# Patient Record
Sex: FEMALE | Race: BLACK OR AFRICAN AMERICAN | NOT HISPANIC OR LATINO | URBAN - METROPOLITAN AREA
[De-identification: names, ages, dates, MRNs, and addresses within clinical notes are randomized per-mention and may not be internally consistent; named-entity substitution may affect disease eponyms.]

---

## 2021-10-13 ENCOUNTER — INPATIENT (INPATIENT)
Facility: HOSPITAL | Age: 56
LOS: 0 days | Discharge: ANOTHER HOSPITAL | End: 2021-10-14
Attending: INTERNAL MEDICINE | Admitting: INTERNAL MEDICINE
Payer: COMMERCIAL

## 2021-10-13 VITALS
RESPIRATION RATE: 16 BRPM | HEART RATE: 74 BPM | OXYGEN SATURATION: 99 % | TEMPERATURE: 98 F | DIASTOLIC BLOOD PRESSURE: 82 MMHG | WEIGHT: 259.26 LBS | SYSTOLIC BLOOD PRESSURE: 136 MMHG

## 2021-10-13 DIAGNOSIS — Z29.9 ENCOUNTER FOR PROPHYLACTIC MEASURES, UNSPECIFIED: ICD-10-CM

## 2021-10-13 DIAGNOSIS — I61.9 NONTRAUMATIC INTRACEREBRAL HEMORRHAGE, UNSPECIFIED: ICD-10-CM

## 2021-10-13 DIAGNOSIS — E16.2 HYPOGLYCEMIA, UNSPECIFIED: ICD-10-CM

## 2021-10-13 LAB
ALBUMIN SERPL ELPH-MCNC: 3.8 G/DL — SIGNIFICANT CHANGE UP (ref 3.3–5)
ALP SERPL-CCNC: 74 U/L — SIGNIFICANT CHANGE UP (ref 40–120)
ALT FLD-CCNC: 13 U/L — SIGNIFICANT CHANGE UP (ref 4–33)
ANION GAP SERPL CALC-SCNC: 10 MMOL/L — SIGNIFICANT CHANGE UP (ref 7–14)
APPEARANCE UR: CLEAR — SIGNIFICANT CHANGE UP
APTT BLD: 36.7 SEC — HIGH (ref 27–36.3)
AST SERPL-CCNC: 22 U/L — SIGNIFICANT CHANGE UP (ref 4–32)
B PERT DNA SPEC QL NAA+PROBE: SIGNIFICANT CHANGE UP
B PERT+PARAPERT DNA PNL SPEC NAA+PROBE: SIGNIFICANT CHANGE UP
BASOPHILS # BLD AUTO: 0.01 K/UL — SIGNIFICANT CHANGE UP (ref 0–0.2)
BASOPHILS NFR BLD AUTO: 0.2 % — SIGNIFICANT CHANGE UP (ref 0–2)
BILIRUB SERPL-MCNC: 0.2 MG/DL — SIGNIFICANT CHANGE UP (ref 0.2–1.2)
BILIRUB UR-MCNC: NEGATIVE — SIGNIFICANT CHANGE UP
BLOOD GAS VENOUS COMPREHENSIVE RESULT: SIGNIFICANT CHANGE UP
BORDETELLA PARAPERTUSSIS (RAPRVP): SIGNIFICANT CHANGE UP
BUN SERPL-MCNC: 8 MG/DL — SIGNIFICANT CHANGE UP (ref 7–23)
C PNEUM DNA SPEC QL NAA+PROBE: SIGNIFICANT CHANGE UP
CALCIUM SERPL-MCNC: 9 MG/DL — SIGNIFICANT CHANGE UP (ref 8.4–10.5)
CHLORIDE SERPL-SCNC: 107 MMOL/L — SIGNIFICANT CHANGE UP (ref 98–107)
CO2 SERPL-SCNC: 24 MMOL/L — SIGNIFICANT CHANGE UP (ref 22–31)
COLOR SPEC: SIGNIFICANT CHANGE UP
CREAT SERPL-MCNC: 0.91 MG/DL — SIGNIFICANT CHANGE UP (ref 0.5–1.3)
DIFF PNL FLD: NEGATIVE — SIGNIFICANT CHANGE UP
EOSINOPHIL # BLD AUTO: 0.05 K/UL — SIGNIFICANT CHANGE UP (ref 0–0.5)
EOSINOPHIL NFR BLD AUTO: 0.9 % — SIGNIFICANT CHANGE UP (ref 0–6)
FLUAV SUBTYP SPEC NAA+PROBE: SIGNIFICANT CHANGE UP
FLUBV RNA SPEC QL NAA+PROBE: SIGNIFICANT CHANGE UP
GLUCOSE BLDC GLUCOMTR-MCNC: 78 MG/DL — SIGNIFICANT CHANGE UP (ref 70–99)
GLUCOSE SERPL-MCNC: 84 MG/DL — SIGNIFICANT CHANGE UP (ref 70–99)
GLUCOSE UR QL: NEGATIVE — SIGNIFICANT CHANGE UP
HADV DNA SPEC QL NAA+PROBE: SIGNIFICANT CHANGE UP
HCOV 229E RNA SPEC QL NAA+PROBE: SIGNIFICANT CHANGE UP
HCOV HKU1 RNA SPEC QL NAA+PROBE: SIGNIFICANT CHANGE UP
HCOV NL63 RNA SPEC QL NAA+PROBE: SIGNIFICANT CHANGE UP
HCOV OC43 RNA SPEC QL NAA+PROBE: SIGNIFICANT CHANGE UP
HCT VFR BLD CALC: 40.2 % — SIGNIFICANT CHANGE UP (ref 34.5–45)
HGB BLD-MCNC: 12.7 G/DL — SIGNIFICANT CHANGE UP (ref 11.5–15.5)
HMPV RNA SPEC QL NAA+PROBE: SIGNIFICANT CHANGE UP
HPIV1 RNA SPEC QL NAA+PROBE: SIGNIFICANT CHANGE UP
HPIV2 RNA SPEC QL NAA+PROBE: SIGNIFICANT CHANGE UP
HPIV3 RNA SPEC QL NAA+PROBE: SIGNIFICANT CHANGE UP
HPIV4 RNA SPEC QL NAA+PROBE: SIGNIFICANT CHANGE UP
IANC: 3.23 K/UL — SIGNIFICANT CHANGE UP (ref 1.5–8.5)
IMM GRANULOCYTES NFR BLD AUTO: 0.4 % — SIGNIFICANT CHANGE UP (ref 0–1.5)
INR BLD: 1.04 RATIO — SIGNIFICANT CHANGE UP (ref 0.88–1.16)
KETONES UR-MCNC: NEGATIVE — SIGNIFICANT CHANGE UP
LEUKOCYTE ESTERASE UR-ACNC: NEGATIVE — SIGNIFICANT CHANGE UP
LYMPHOCYTES # BLD AUTO: 1.93 K/UL — SIGNIFICANT CHANGE UP (ref 1–3.3)
LYMPHOCYTES # BLD AUTO: 34.5 % — SIGNIFICANT CHANGE UP (ref 13–44)
M PNEUMO DNA SPEC QL NAA+PROBE: SIGNIFICANT CHANGE UP
MCHC RBC-ENTMCNC: 26.2 PG — LOW (ref 27–34)
MCHC RBC-ENTMCNC: 31.6 GM/DL — LOW (ref 32–36)
MCV RBC AUTO: 83.1 FL — SIGNIFICANT CHANGE UP (ref 80–100)
MONOCYTES # BLD AUTO: 0.36 K/UL — SIGNIFICANT CHANGE UP (ref 0–0.9)
MONOCYTES NFR BLD AUTO: 6.4 % — SIGNIFICANT CHANGE UP (ref 2–14)
NEUTROPHILS # BLD AUTO: 3.23 K/UL — SIGNIFICANT CHANGE UP (ref 1.8–7.4)
NEUTROPHILS NFR BLD AUTO: 57.6 % — SIGNIFICANT CHANGE UP (ref 43–77)
NITRITE UR-MCNC: NEGATIVE — SIGNIFICANT CHANGE UP
NRBC # BLD: 0 /100 WBCS — SIGNIFICANT CHANGE UP
NRBC # FLD: 0 K/UL — SIGNIFICANT CHANGE UP
PH UR: 7 — SIGNIFICANT CHANGE UP (ref 5–8)
PLATELET # BLD AUTO: 237 K/UL — SIGNIFICANT CHANGE UP (ref 150–400)
POTASSIUM SERPL-MCNC: 4.2 MMOL/L — SIGNIFICANT CHANGE UP (ref 3.5–5.3)
POTASSIUM SERPL-SCNC: 4.2 MMOL/L — SIGNIFICANT CHANGE UP (ref 3.5–5.3)
PROT SERPL-MCNC: 7.7 G/DL — SIGNIFICANT CHANGE UP (ref 6–8.3)
PROT UR-MCNC: ABNORMAL
PROTHROM AB SERPL-ACNC: 11.9 SEC — SIGNIFICANT CHANGE UP (ref 10.6–13.6)
RAPID RVP RESULT: SIGNIFICANT CHANGE UP
RBC # BLD: 4.84 M/UL — SIGNIFICANT CHANGE UP (ref 3.8–5.2)
RBC # FLD: 15.3 % — HIGH (ref 10.3–14.5)
RSV RNA SPEC QL NAA+PROBE: SIGNIFICANT CHANGE UP
RV+EV RNA SPEC QL NAA+PROBE: SIGNIFICANT CHANGE UP
SARS-COV-2 RNA SPEC QL NAA+PROBE: SIGNIFICANT CHANGE UP
SODIUM SERPL-SCNC: 141 MMOL/L — SIGNIFICANT CHANGE UP (ref 135–145)
SP GR SPEC: >1.05 (ref 1–1.05)
TROPONIN T, HIGH SENSITIVITY RESULT: <6 NG/L — SIGNIFICANT CHANGE UP
UROBILINOGEN FLD QL: SIGNIFICANT CHANGE UP
WBC # BLD: 5.6 K/UL — SIGNIFICANT CHANGE UP (ref 3.8–10.5)
WBC # FLD AUTO: 5.6 K/UL — SIGNIFICANT CHANGE UP (ref 3.8–10.5)

## 2021-10-13 PROCEDURE — 70450 CT HEAD/BRAIN W/O DYE: CPT | Mod: 26,59,77

## 2021-10-13 PROCEDURE — 71045 X-RAY EXAM CHEST 1 VIEW: CPT | Mod: 26

## 2021-10-13 PROCEDURE — 70498 CT ANGIOGRAPHY NECK: CPT | Mod: 26

## 2021-10-13 PROCEDURE — 99291 CRITICAL CARE FIRST HOUR: CPT

## 2021-10-13 PROCEDURE — 70496 CT ANGIOGRAPHY HEAD: CPT | Mod: 26

## 2021-10-13 RX ORDER — MECLIZINE HCL 12.5 MG
50 TABLET ORAL ONCE
Refills: 0 | Status: COMPLETED | OUTPATIENT
Start: 2021-10-13 | End: 2021-10-13

## 2021-10-13 RX ORDER — ONDANSETRON 8 MG/1
4 TABLET, FILM COATED ORAL EVERY 8 HOURS
Refills: 0 | Status: DISCONTINUED | OUTPATIENT
Start: 2021-10-13 | End: 2021-10-14

## 2021-10-13 RX ORDER — SODIUM CHLORIDE 9 MG/ML
1000 INJECTION, SOLUTION INTRAVENOUS
Refills: 0 | Status: DISCONTINUED | OUTPATIENT
Start: 2021-10-13 | End: 2021-10-13

## 2021-10-13 RX ORDER — METOCLOPRAMIDE HCL 10 MG
10 TABLET ORAL ONCE
Refills: 0 | Status: COMPLETED | OUTPATIENT
Start: 2021-10-13 | End: 2021-10-13

## 2021-10-13 RX ORDER — LEVETIRACETAM 250 MG/1
500 TABLET, FILM COATED ORAL EVERY 12 HOURS
Refills: 0 | Status: DISCONTINUED | OUTPATIENT
Start: 2021-10-13 | End: 2021-10-14

## 2021-10-13 RX ORDER — DEXTROSE 50 % IN WATER 50 %
15 SYRINGE (ML) INTRAVENOUS ONCE
Refills: 0 | Status: DISCONTINUED | OUTPATIENT
Start: 2021-10-13 | End: 2021-10-13

## 2021-10-13 RX ORDER — LANOLIN ALCOHOL/MO/W.PET/CERES
3 CREAM (GRAM) TOPICAL AT BEDTIME
Refills: 0 | Status: DISCONTINUED | OUTPATIENT
Start: 2021-10-13 | End: 2021-10-14

## 2021-10-13 RX ORDER — ACETAMINOPHEN 500 MG
650 TABLET ORAL EVERY 6 HOURS
Refills: 0 | Status: DISCONTINUED | OUTPATIENT
Start: 2021-10-13 | End: 2021-10-14

## 2021-10-13 RX ORDER — DEXTROSE 50 % IN WATER 50 %
25 SYRINGE (ML) INTRAVENOUS ONCE
Refills: 0 | Status: DISCONTINUED | OUTPATIENT
Start: 2021-10-13 | End: 2021-10-13

## 2021-10-13 RX ORDER — GLUCAGON INJECTION, SOLUTION 0.5 MG/.1ML
1 INJECTION, SOLUTION SUBCUTANEOUS ONCE
Refills: 0 | Status: DISCONTINUED | OUTPATIENT
Start: 2021-10-13 | End: 2021-10-13

## 2021-10-13 RX ORDER — DEXTROSE 50 % IN WATER 50 %
12.5 SYRINGE (ML) INTRAVENOUS ONCE
Refills: 0 | Status: DISCONTINUED | OUTPATIENT
Start: 2021-10-13 | End: 2021-10-13

## 2021-10-13 RX ORDER — HYDRALAZINE HCL 50 MG
5 TABLET ORAL EVERY 6 HOURS
Refills: 0 | Status: DISCONTINUED | OUTPATIENT
Start: 2021-10-13 | End: 2021-10-14

## 2021-10-13 RX ADMIN — LEVETIRACETAM 400 MILLIGRAM(S): 250 TABLET, FILM COATED ORAL at 18:25

## 2021-10-13 RX ADMIN — Medication 650 MILLIGRAM(S): at 21:20

## 2021-10-13 RX ADMIN — Medication 650 MILLIGRAM(S): at 22:03

## 2021-10-13 RX ADMIN — Medication 50 MILLIGRAM(S): at 13:32

## 2021-10-13 RX ADMIN — Medication 10 MILLIGRAM(S): at 13:32

## 2021-10-13 NOTE — CONSULT NOTE ADULT - ATTENDING COMMENTS
Ms. Leyva is a 56-year-old woman who presented with history of disorientation going on for 3 days.  Today she complains of severe headache off and on.  She also had difficulty with ambulation.  Today on neurological exam she is alert, awake, appears in pain despite of analgesics.  No motor or sensory deficit.  Gait was deferred.    Impression: Left lobar intracerebral hemorrhage  Differential diagnosis includes, underlying lesion she will need MRI brain with and without contrast  EEG (history of disorientation)  After review of MRI if no clear pathology found, then will arrange for catheter cerebral angiogram.  Plan:  - Avoid any antiplatelets or therapeutic anticoagulation  - BP goal  <160/90  - SCDs for DVT prophylaxis for now. Would consider pharmacological DVT prophylaxis at 48 hours after establishing stability of the ICH with repeat brain imaging   - MRI brain with and without contrast/MRA head and neck with and without contrast  - HbA1C and LDL  - TTE to look for LVH concerning for long standing HTN  - Agree with aggressive vascular risk factors modifications     Above mentioned plan was discussed with patient and available family members at bedside. All the questions were answered and concerns were addressed.

## 2021-10-13 NOTE — CONSULT NOTE ADULT - PROBLEM SELECTOR RECOMMENDATION 9
1. Repeat CTH in 12H  2. MRI Brain w./w.o to r/o underlying lesion  3. Keppra x 7 days  Case d/w attending

## 2021-10-13 NOTE — ED ADULT TRIAGE NOTE - CHIEF COMPLAINT QUOTE
Pt presents to ED via wheelchair from home with c/o dizziness and feeling "out of it" since yesterday when she woke up. Pt denies past medical hx. Pt denies headache, numbness, tingling, vision changes or other neuro deficits. Pt presents to ED via wheelchair from home with c/o dizziness and feeling "out of it" since yesterday when she woke up. Pt denies past medical hx. Pt denies headache, numbness, tingling, vision changes or other neuro deficits. Gladys Madera (daughter) [phone]

## 2021-10-13 NOTE — CONSULT NOTE ADULT - SUBJECTIVE AND OBJECTIVE BOX
INCOMPLETE    HPI: Patient is a 56 year old R-handed female with no known past medical history presents to [facility] ED as code stroke for disorientation x 3 days. LKW on Monday morning. Patient reports she feels non-specific confusion & disorientation. Also endorses a progressive onset HA that reached a severity of 10/10 that morning, then progressively improved over the last three days, denies current HA. Also admits to persistent dizziness at the onset of symptoms that made it difficult to walk. Denies changes in vision, blurry vision, double vision    (Stroke only)  NIHSS:   MRS:   ICH:     REVIEW OF SYSTEMS  General:	  Skin/Breast:	  Ophthalmologic:  ENMT:	  Respiratory and Thorax:	  Cardiovascular:	  Gastrointestinal:	  Genitourinary:	  Musculoskeletal:	  Neurological:	  Psychiatric:	  Hematology/Lymphatics:	  Endocrine:	  Allergic/Immunologic:	    A 10-system ROS was performed and is negative except for those items noted above and/or in the HPI.    PAST MEDICAL & SURGICAL HISTORY:    FAMILY HISTORY:    SOCIAL HISTORY:   T/E/D:   Occupation:   Lives with:     MEDICATIONS (HOME):  Home Medications:    MEDICATIONS  (STANDING):    MEDICATIONS  (PRN):    ALLERGIES/INTOLERANCES:  Allergies  No Known Allergies    Intolerances    VITALS & EXAMINATION:  Vital Signs Last 24 Hrs  T(C): 36.7 (13 Oct 2021 12:15), Max: 36.7 (13 Oct 2021 12:15)  T(F): 98.1 (13 Oct 2021 12:15), Max: 98.1 (13 Oct 2021 12:15)  HR: 74 (13 Oct 2021 12:15) (74 - 74)  BP: 136/82 (13 Oct 2021 12:15) (136/82 - 136/82)  BP(mean): --  RR: 16 (13 Oct 2021 12:15) (16 - 16)  SpO2: 99% (13 Oct 2021 12:15) (99% - 99%)    General:  Constitutional: Obese Female, appears stated age, in no apparent distress including pain  Head: Normocephalic & atraumatic.  ENT: Patent ear canals, intact TM, mucus membranes moist & pink, neck supple, no lymphadenopathy.   Respiratory: Patent airway. All lung fields are clear to auscultation bilaterally.  Extremities: No cyanosis, clubbing, or edema.  Skin: No rashes, bruising, or discoloration.    Cardiovascular (>2): RRR no murmurs. Carotid pulsations symmetric, no bruits. Normal capillary beds refill, 1-2 seconds or less.     Neurological (>12):  MS: Awake, alert, oriented to person, place, situation, time. Normal affect. Follows all commands.    Language: Speech is clear, fluent with good repetition & comprehension (able to name objects___)    CNs: PERRLA (R = 3mm, L = 3mm). VFF. EOMI no nystagmus, no diplopia. V1-3 intact to LT/pinprick, well developed masseter muscles b/l. No facial asymmetry b/l, full eye closure strength b/l. Hearing grossly normal (rubbing fingers) b/l. Symmetric palate elevation in midline. Gag reflex deferred. Head turning & shoulder shrug intact b/l. Tongue midline, normal movements, no atrophy.    Fundoscopic: pale w/ sharp discs margins No vascular changes.      Motor: Normal muscle bulk & tone. No noticeable tremor or seizure. No pronator drift.              Deltoid	Biceps	Triceps	Wrist	Finger ABd	   R	5	5	5	5	5		5 	  L	5	5	5	5	5		5    	H-Flex	H-Ext	H-ABd	H-ADd	K-Flex	K-Ext	D-Flex	P-Flex  R	5	5	5	5	5	5	5	5 	   L	5	5	5	5	5	5	5	5	     Sensation: Intact to LT/PP/Temp/Vibration/Position b/l throughout.     Cortical: Extinction on DSS (neglect): none    Reflexes:              Biceps(C5)       BR(C6)     Triceps(C7)               Patellar(L4)    Achilles(S1)    Plantar Resp  R	2	          2	             2		        2		    2		Down   L	2	          2	             2		        2		    2		Down     Coordination: intact rapid-alt movements. No dysmetria to FTN/HTS    Gait: Normal Romberg. No postural instability. Normal stance and tandem gait.     LABORATORY:  CBC                       12.7   5.60  )-----------( 237      ( 13 Oct 2021 13:50 )             40.2     Chem       LFTs   Coagulopathy   Lipid Panel   A1c   Cardiac enzymes     U/A   CSF  Immunological  Other    STUDIES & IMAGING:  Studies (EKG, EEG, EMG, etc):     Radiology (XR, CT, MR, U/S, TTE/KIANNA): INCOMPLETE    HPI: Patient is a 56 year old R-handed female with no known past medical history presents to [facility] ED as code stroke for disorientation x 3 days. LKW on Monday morning (10/11). Patient reports she feels non-specific confusion & disorientation. Also endorses a progressive onset HA that reached a severity of 10/10 that morning, then progressively improved over the last three days, described as "blinding." Denies current HA. Also admits to persistent dizziness at the onset of symptoms that made it difficult to walk, worse with ambulation and eye movements. Denies changes in vision, blurry vision, double vision, N/V, CP, SOB, palpitations, weakness, numbness/tingling, difficulty with speech, difficulty with swallowing. Denies use of tobacco, alcohol, or illicit drugs. Patient does not regularly follow with a PCP.     A 10-system ROS was performed and is negative except for those items noted above and/or in the HPI.    PAST MEDICAL & SURGICAL HISTORY:    FAMILY HISTORY:    SOCIAL HISTORY:   T/E/D:   Occupation:   Lives with:     MEDICATIONS (HOME):  Home Medications:    MEDICATIONS  (STANDING):    MEDICATIONS  (PRN):    ALLERGIES/INTOLERANCES:  Allergies  No Known Allergies    Intolerances    VITALS & EXAMINATION:  Vital Signs Last 24 Hrs  T(C): 36.7 (13 Oct 2021 12:15), Max: 36.7 (13 Oct 2021 12:15)  T(F): 98.1 (13 Oct 2021 12:15), Max: 98.1 (13 Oct 2021 12:15)  HR: 74 (13 Oct 2021 12:15) (74 - 74)  BP: 136/82 (13 Oct 2021 12:15) (136/82 - 136/82)  BP(mean): --  RR: 16 (13 Oct 2021 12:15) (16 - 16)  SpO2: 99% (13 Oct 2021 12:15) (99% - 99%)    General:  Constitutional: Female, appears stated age, in no apparent distress including pain    Neurological:  MS: Eyes open, move spontaneously. Awake, alert, oriented to person, place, situation, time. Normal affect. Follows all commands.    Language: Speech is clear, fluent with good repetition & comprehension (able to name pen & glove)    CNs: PERRL (R = 3mm, L = 3mm). VFF. EOMI no nystagmus. V1-3 intact to LT. No facial asymmetry with movements. Hearing grossly normal (rubbing fingers) b/l. Symmetric palate elevation in midline. Gag reflex deferred. Head turning & shoulder shrug intact b/l. Tongue midline, normal movements, no atrophy.    Motor: Normal muscle bulk & tone. No noticeable tremor. LUE drift. No drift on RUE. No drift on LE drift b/l.              Deltoid	   R	5	5		  L	5	5	    	H-Flex	K-Flex	K-Ext	D-Flex	P-Flex  R	 	   L	     Sensation: Intact to LT b/l throughout.     Cortical: Extinction on DSS (neglect): none    Reflexes:              Biceps(C5)       BR(C6)     Triceps(C7)               Patellar(L4)      R	2	          2	             2		        2		    		  L	2	          2	             2		        2		    		    Coordination: intact rapid-alt movements. No dysmetria to FTN    Gait: Postural instability from sit to stand position. Unable to assess gait.     LABORATORY:  CBC                       12.7   5.60  )-----------( 237      ( 13 Oct 2021 13:50 )             40.2       STUDIES & IMAGING:  Studies (EKG, EEG, EMG, etc):     Radiology (XR, CT, MR, U/S, TTE/KIANNA):    NCCT: Left parietal, lobar, well-circumscribed, intraparenchymal hemorrhage with associated edema, to my eye. Pending official read.     HPI: Patient is a 56 year old R-handed female with no known past medical history presents to [facility] ED as code stroke for disorientation x 3 days. LKW on Monday morning (10/11). Patient reports she feels non-specific confusion & disorientation. Also endorses a progressive onset HA that reached a severity of 10/10 that morning, then progressively improved over the last three days, described as "blinding." Denies current HA. Also admits to persistent dizziness at the onset of symptoms that made it difficult to walk, worse with ambulation and eye movements. Admits to weakness on the left side of her body, when asked. Denies changes in vision, blurry vision, double vision, N/V, CP, SOB, palpitations, numbness/tingling, difficulty with speech, difficulty with swallowing. Denies use of tobacco, alcohol, or illicit drugs. Patient does not regularly follow with PCP.     A 10-system ROS was performed and is negative except for those items noted above and/or in the HPI.    PAST MEDICAL & SURGICAL HISTORY:    FAMILY HISTORY:    SOCIAL HISTORY:   T/E/D:   Occupation:   Lives with:     MEDICATIONS (HOME):  Home Medications:    MEDICATIONS  (STANDING):    MEDICATIONS  (PRN):    ALLERGIES/INTOLERANCES:  Allergies  No Known Allergies    Intolerances    VITALS & EXAMINATION:  Vital Signs Last 24 Hrs  T(C): 36.7 (13 Oct 2021 12:15), Max: 36.7 (13 Oct 2021 12:15)  T(F): 98.1 (13 Oct 2021 12:15), Max: 98.1 (13 Oct 2021 12:15)  HR: 74 (13 Oct 2021 12:15) (74 - 74)  BP: 136/82 (13 Oct 2021 12:15) (136/82 - 136/82)  BP(mean): --  RR: 16 (13 Oct 2021 12:15) (16 - 16)  SpO2: 99% (13 Oct 2021 12:15) (99% - 99%)    General:  Constitutional: Female, appears stated age, in no apparent distress including pain    Neurological:  MS: Eyes open, move spontaneously. Awake, alert, oriented to person, place, situation, time. Normal affect. Follows all commands.    Language: Speech is clear, fluent with good repetition & comprehension (able to name pen & glove)    CNs: PERRL (R = 3mm, L = 3mm). VFF. EOMI no nystagmus. V1-3 intact to LT. No facial asymmetry with movements. Hearing grossly normal (rubbing fingers) b/l. Symmetric palate elevation in midline. Gag reflex deferred. Head turning & shoulder shrug intact b/l. Tongue midline, normal movements, no atrophy.    Motor: Normal muscle bulk & tone. No noticeable tremor. LUE drift. No drift on RUE. No drift on LE drift b/l.              Deltoid	  Bicep   Tricep  R	5	5	5	5  L	5	5	4          5    	H-Flex	K-Flex	K-Ext	D-Flex	P-Flex  R	 5	5            4-         5           5  L	 4-        5            4+        5           5    Sensation: Intact to LT b/l throughout.     Cortical: Extinction on DSS (neglect): none    Reflexes:              Biceps(C5)       BR(C6)     Triceps(C7)               Patellar(L4)      R	2	          2	             2		        2		    		  L	2	          2	             2		        2		    		    Coordination: intact rapid-alt movements. No dysmetria to FTN    Gait: Postural instability from sit to stand position. Unable to assess gait.     LABORATORY:  CBC                       12.7   5.60  )-----------( 237      ( 13 Oct 2021 13:50 )             40.2       STUDIES & IMAGING:  Studies (EKG, EEG, EMG, etc):     Radiology (XR, CT, MR, U/S, TTE/KIANNA):    Head CT:    There is intraparenchymal hemorrhage in the left parietal lobule measuring 2.5 x 1.7 x 3.4 cm in TRV, AP and craniocaudal dimensions. There is no extension of the hemorrhage into the ventricles. There is perihemorrhagic edema without shift or herniation. There is no significant mass effect on the left side of the splenium of the corpus callosum or in the posterior horn of the left lateral ventricle.    The ventricles, cisterns and sulciare normal in size, shape. With the exception of the above findings, the gray-white matter junction is well preserved. There is no abnormal extra-axial fluid collection or hydrocephalus.    The visualized globes are symmetric bilaterally. The paranasal sinuses and tympanomastoid air cells are clear.    CT angiography:    NECK:  The aortic arch is conventional in anatomy. Origin of supraaortic arteries are patent. The common carotid arteries are normal in course and caliber. There are atherosclerotic plaques at common carotid bifurcations and carotid bulbs without evidence of significant segmental stenosis.    The right internal carotid artery is normal in caliber. There is 0 % stenosis using NASCET criteria (normal right ICA caliber is 4.3 mm).    The left internal carotid artery follows a retropharyngeal course. The left internal carotid artery is normal in caliber. There is 0 % stenosis using NASCET criteria (normal left ICA caliber is 4.1 mm).    Bilateral vertebral arteries are normalin course, caliber and contour, without evidence of dissection or occlusion.    Degenerative changes of the bony cervical spine are noted.    BRAIN:    The petrocavernous and supraclinoid ICA segments are normal in caliber.  The visualized MCA and EDGAR branches are normal without evidence of stenosis or aneurysm. The ACOM is present. Bilateral posterior communicating arteries are present.  The vertebral arteries, visualized cerebellar arteries, basilar and bilateral posterior cerebral arteries arepatent without evidence of stenoses or aneurysm. There is no dominant arteriovenous malformation identified in the left parietal lobule at the site of intracranial hemorrhage.    None.    CT venogram:  The superior sagittal sinus, bilateral transverse sinuses, and bilateral sigmoid sinuses are patent and demonstrate contrast enhancement without filling defect to suggest venous thrombosis. The transverse sinuses are codominant.    The vein of Jak, straight sinus, and sinus confluence are patent.    The internal cerebral veins, thalamostriate veins, and basal veins of Nadya are patent.      IMPRESSION:    CT brain:    There is an intraparenchymal hemorrhage in the left parietal lobule with perihemorrhagic edema as described. There is no extension of the hemorrhage into the ventricles, midline shift or herniation.    There is no acute lobar infarction.    CTA brain: There is no large vessel occlusion or aneurysm involving major proximal intracranial arteries. There is no dominant arteriovenous malformation.    CTA NECK: There is no stenosis involving major neck arteries.    CT venogram: There is no high-grade stenosis or occlusion within the dural venous sinuses.    NASCET CAROTID STENOSIS CRITERIA (distal normal appearing ICA as denominator for measurement): 0%-none, 1-49%-mild, 50-70%-moderate, 70-89%-severe, 90-99%-critical.    Notification to clinician of alert:    Provider   Dr. Smiley was notified about the final results at 10/13/2021 1:56 PM and Dr. Adair was notified aboutthe final results at 1410 hours, 10/13/2021 via telephone by neuroradiologist BRIANNE Erazo. Readback confirmation was obtained.      --- End of Report ---       HPI: Patient is a 56 year old R-handed female with no known past medical history presents to [facility] ED as code stroke for disorientation x 3 days. LKW on Monday morning (10/11). Patient reports she feels non-specific confusion & disorientation. Also endorses a progressive onset HA that reached a severity of 10/10 that morning, then progressively improved over the last three days, described as "blinding." Denies current HA. Also admits to persistent dizziness at the onset of symptoms that made it difficult to walk, worse with ambulation and eye movements. Admits to weakness on the left side of her body, when asked. Denies changes in vision, blurry vision, double vision, N/V, CP, SOB, palpitations, numbness/tingling, difficulty with speech, difficulty with swallowing. Denies use of tobacco, alcohol, or illicit drugs. Patient does not regularly follow with PCP. Patient does not regularly take any medications.     A 10-system ROS was performed and is negative except for those items noted above and/or in the HPI.    PAST MEDICAL & SURGICAL HISTORY:    FAMILY HISTORY:    SOCIAL HISTORY:   T/E/D:   Occupation:   Lives with:     MEDICATIONS (HOME):  Home Medications:    MEDICATIONS  (STANDING):    MEDICATIONS  (PRN):    ALLERGIES/INTOLERANCES:  Allergies  No Known Allergies    Intolerances    VITALS & EXAMINATION:  Vital Signs Last 24 Hrs  T(C): 36.7 (13 Oct 2021 12:15), Max: 36.7 (13 Oct 2021 12:15)  T(F): 98.1 (13 Oct 2021 12:15), Max: 98.1 (13 Oct 2021 12:15)  HR: 74 (13 Oct 2021 12:15) (74 - 74)  BP: 136/82 (13 Oct 2021 12:15) (136/82 - 136/82)  BP(mean): --  RR: 16 (13 Oct 2021 12:15) (16 - 16)  SpO2: 99% (13 Oct 2021 12:15) (99% - 99%)    General:  Constitutional: Female, appears stated age, in no apparent distress including pain    Neurological:  MS: Eyes open, move spontaneously. Awake, alert, oriented to person, place, situation, time. Normal affect. Follows all commands.    Language: Speech is clear, fluent with good repetition & comprehension (able to name pen & glove)    CNs: PERRL (R = 3mm, L = 3mm). VFF. EOMI no nystagmus. V1-3 intact to LT. No facial asymmetry with movements. Hearing grossly normal (rubbing fingers) b/l. Symmetric palate elevation in midline. Gag reflex deferred. Head turning & shoulder shrug intact b/l. Tongue midline, normal movements, no atrophy.    Motor: Normal muscle bulk & tone. No noticeable tremor. LUE drift. No drift on RUE. No drift on LE drift b/l.              Deltoid	  Bicep   Tricep  R	5	5	5	5  L	5	5	4          5    	H-Flex	K-Flex	K-Ext	D-Flex	P-Flex  R	 5	5            4-         5           5  L	 4-        5            4+        5           5    Sensation: Intact to LT b/l throughout.     Cortical: Extinction on DSS (neglect): none    Reflexes:              Biceps(C5)       BR(C6)     Triceps(C7)               Patellar(L4)      R	2	          2	             2		        2		    		  L	2	          2	             2		        2		    		    Coordination: intact rapid-alt movements. No dysmetria to FTN    Gait: Postural instability from sit to stand position. Unable to assess gait.     LABORATORY:  CBC                       12.7   5.60  )-----------( 237      ( 13 Oct 2021 13:50 )             40.2       STUDIES & IMAGING:  Studies (EKG, EEG, EMG, etc):     Radiology (XR, CT, MR, U/S, TTE/KIANNA):    Head CT:    There is intraparenchymal hemorrhage in the left parietal lobule measuring 2.5 x 1.7 x 3.4 cm in TRV, AP and craniocaudal dimensions. There is no extension of the hemorrhage into the ventricles. There is perihemorrhagic edema without shift or herniation. There is no significant mass effect on the left side of the splenium of the corpus callosum or in the posterior horn of the left lateral ventricle.    The ventricles, cisterns and sulciare normal in size, shape. With the exception of the above findings, the gray-white matter junction is well preserved. There is no abnormal extra-axial fluid collection or hydrocephalus.    The visualized globes are symmetric bilaterally. The paranasal sinuses and tympanomastoid air cells are clear.    CT angiography:    NECK:  The aortic arch is conventional in anatomy. Origin of supraaortic arteries are patent. The common carotid arteries are normal in course and caliber. There are atherosclerotic plaques at common carotid bifurcations and carotid bulbs without evidence of significant segmental stenosis.    The right internal carotid artery is normal in caliber. There is 0 % stenosis using NASCET criteria (normal right ICA caliber is 4.3 mm).    The left internal carotid artery follows a retropharyngeal course. The left internal carotid artery is normal in caliber. There is 0 % stenosis using NASCET criteria (normal left ICA caliber is 4.1 mm).    Bilateral vertebral arteries are normalin course, caliber and contour, without evidence of dissection or occlusion.    Degenerative changes of the bony cervical spine are noted.    BRAIN:    The petrocavernous and supraclinoid ICA segments are normal in caliber.  The visualized MCA and EDGAR branches are normal without evidence of stenosis or aneurysm. The ACOM is present. Bilateral posterior communicating arteries are present.  The vertebral arteries, visualized cerebellar arteries, basilar and bilateral posterior cerebral arteries arepatent without evidence of stenoses or aneurysm. There is no dominant arteriovenous malformation identified in the left parietal lobule at the site of intracranial hemorrhage.    None.    CT venogram:  The superior sagittal sinus, bilateral transverse sinuses, and bilateral sigmoid sinuses are patent and demonstrate contrast enhancement without filling defect to suggest venous thrombosis. The transverse sinuses are codominant.    The vein of Jak, straight sinus, and sinus confluence are patent.    The internal cerebral veins, thalamostriate veins, and basal veins of Nadya are patent.      IMPRESSION:    CT brain:    There is an intraparenchymal hemorrhage in the left parietal lobule with perihemorrhagic edema as described. There is no extension of the hemorrhage into the ventricles, midline shift or herniation.    There is no acute lobar infarction.    CTA brain: There is no large vessel occlusion or aneurysm involving major proximal intracranial arteries. There is no dominant arteriovenous malformation.    CTA NECK: There is no stenosis involving major neck arteries.    CT venogram: There is no high-grade stenosis or occlusion within the dural venous sinuses.    NASCET CAROTID STENOSIS CRITERIA (distal normal appearing ICA as denominator for measurement): 0%-none, 1-49%-mild, 50-70%-moderate, 70-89%-severe, 90-99%-critical.    Notification to clinician of alert:    Provider   Dr. Smiley was notified about the final results at 10/13/2021 1:56 PM and Dr. Adair was notified aboutthe final results at 1410 hours, 10/13/2021 via telephone by neuroradiologist BRIANNE Erazo. Readback confirmation was obtained.      --- End of Report ---

## 2021-10-13 NOTE — ED PROVIDER NOTE - PROGRESS NOTE DETAILS
Giovany:  bleed seen on CT, neurology aware, neurosurgery consulted, patient transferred to [facility] Dr. Dsouza & Dr. Paige

## 2021-10-13 NOTE — ED ADULT NURSE NOTE - OBJECTIVE STATEMENT
receive dpt in bed A and Ox 3 in NAD resting comfortably, c/o dizziness, onset last night, worse today, denies HA, changes in vision, PERRLA extremities are strong and equal,  no facial asymmetry noted. Code stroke Activated by JE CANCINO initiated labs drawn and sent, Dysphagia screening completed. Pt sent to CT for further studies.

## 2021-10-13 NOTE — H&P ADULT - PROBLEM SELECTOR PLAN 1
-findings as above  -no evidence of cerebral venous thrombosis on CTV  -will r/o underlying lesion vs amyloid angiopathy; MRI ordered  -BP goal <160/90. Hydralazine 5 mg PRN q6h ordered  -repeat CTH 4 hours and 24 hours after initial scan  -STAT repeat CTH if acute decline in neuro exam or new severe HA, changes in vision, and/or intractable N/V   -passed dysphagia screen, diet initiated  -Keppra 500 BID x 7 days for seizure ppx  -PT/OT/S&S  -neurology following

## 2021-10-13 NOTE — CONSULT NOTE ADULT - SUBJECTIVE AND OBJECTIVE BOX
HPI:  Patient is a 56 year old with no known past medical history presents to [facility] ED as code stroke for disorientation x 3 days. LKW on Monday morning (10/11). Patient reports she feels non-specific confusion & disorientation. Also endorses a progressive onset HA that reached a severity of 10/10 that morning, then progressively improved over the last three days, described as "blinding." Denies current HA. Also admits to persistent dizziness at the onset of symptoms that made it difficult to walk, worse with ambulation and eye movements. Denies changes in vision, blurry vision, double vision, N/V, CP, SOB, palpitations, weakness, numbness/tingling, difficulty with speech, difficulty with swallowing. Denies use of tobacco, alcohol, or illicit drugs. Patient does not regularly follow with a PCP.       Allergies  No Known Allergies  Intolerances      Vital Signs Last 24 Hrs  T(C): 36.7 (13 Oct 2021 14:19), Max: 36.7 (13 Oct 2021 12:15)  T(F): 98 (13 Oct 2021 14:19), Max: 98.1 (13 Oct 2021 12:15)  HR: 82 (13 Oct 2021 14:19) (74 - 82)  BP: 132/81 (13 Oct 2021 14:19) (132/81 - 136/82)  BP(mean): --  RR: 16 (13 Oct 2021 14:19) (16 - 16)  SpO2: 99% (13 Oct 2021 14:19) (99% - 99%)    PHYSICAL EXAM:  Awake Alert Attentive Affect appropriate  PERRL, EOMI  Motor- Moving all extremities well  No drift      LABS:                          12.7   5.60  )-----------( 237      ( 13 Oct 2021 13:50 )             40.2     10-13    141  |  107  |  8   ----------------------------<  84  4.2   |  24  |  0.91    Ca    9.0      13 Oct 2021 13:50    TPro  7.7  /  Alb  3.8  /  TBili  0.2  /  DBili  x   /  AST  22  /  ALT  13  /  AlkPhos  74  10-13    PT/INR - ( 13 Oct 2021 13:50 )   PT: 11.9 sec;   INR: 1.04 ratio    PTT - ( 13 Oct 2021 13:50 )  PTT:36.7 sec      RADIOLOGY & ADDITIONAL STUDIES:  < from: CT Angio Head w/ IV Cont (10.13.21 @ 14:36) >  IMPRESSION:    CT brain:    There is an intraparenchymal hemorrhage in the left parietal lobule with perihemorrhagic edema as described. There is no extension of the hemorrhage into the ventricles, midline shift or herniation.    There is no acute lobar infarction.    CTA brain: There is no large vessel occlusion or aneurysm involving major proximal intracranial arteries. There is no dominant arteriovenous malformation.    CTA NECK: There is no stenosis involving major neck arteries.    CT venogram: There is no high-grade stenosis or occlusion within the dural venous sinuses.    NASCET CAROTID STENOSIS CRITERIA (distal normal appearing ICA as denominator for measurement): 0%-none, 1-49%-mild, 50-70%-moderate, 70-89%-severe, 90-99%-critical.    Notification to clinician of alert:    Provider   Dr. Smiley was notified about the final results at 10/13/2021 1:56 PM and Dr. Adair was notified aboutthe final results at 1410 hours, 10/13/2021 via telephone by neuroradiologist BRIANNE Erazo. Readback confirmation was obtained.    < end of copied text >

## 2021-10-13 NOTE — H&P ADULT - NSHPLABSRESULTS_GEN_ALL_CORE
12.7   5.60  )-----------( 237      ( 13 Oct 2021 13:50 )             40.2     Hgb Trend: 12.7<--  10-13    141  |  107  |  8   ----------------------------<  84  4.2   |  24  |  0.91    Ca    9.0      13 Oct 2021 13:50    TPro  7.7  /  Alb  3.8  /  TBili  0.2  /  DBili  x   /  AST  22  /  ALT  13  /  AlkPhos  74  10-13    Creatinine Trend: 0.91<--  PT/INR - ( 13 Oct 2021 13:50 )   PT: 11.9 sec;   INR: 1.04 ratio         PTT - ( 13 Oct 2021 13:50 )  PTT:36.7 sec  Urinalysis Basic - ( 13 Oct 2021 15:24 )    Color: Light Yellow / Appearance: Clear / SG: >1.050 / pH: x  Gluc: x / Ketone: Negative  / Bili: Negative / Urobili: <2 mg/dL   Blood: x / Protein: Trace / Nitrite: Negative   Leuk Esterase: Negative / RBC: 1 /HPF / WBC 14 /HPF   Sq Epi: x / Non Sq Epi: 6 /HPF / Bacteria: Few        EXAM:  CT VENOGRAM BRAIN (W)AW IC      EXAM:  CT ANGIO BRAIN (W)AW IC      EXAM:  CT ANGIO NECK (W)AW IC      EXAM:  CT BRAIN STROKE PROTOCOL      PROCEDURE DATE:  Oct 13 2021     Head CT:    There is intraparenchymal hemorrhage in the left parietal lobule measuring 2.5 x 1.7 x 3.4 cm in TRV, AP and craniocaudal dimensions. There is no extension of the hemorrhage into the ventricles. There is perihemorrhagic edema without shift or herniation. There is no significant mass effect on the left side of the splenium of the corpus callosum or in the posterior horn of the left lateral ventricle.    The ventricles, cisterns and sulciare normal in size, shape. With the exception of the above findings, the gray-white matter junction is well preserved. There is no abnormal extra-axial fluid collection or hydrocephalus.    The visualized globes are symmetric bilaterally. The paranasal sinuses and tympanomastoid air cells are clear.    CT angiography:    NECK:  The aortic arch is conventional in anatomy. Origin of supraaortic arteries are patent. The common carotid arteries are normal in course and caliber. There are atherosclerotic plaques at common carotid bifurcations and carotid bulbs without evidence of significant segmental stenosis.    The right internal carotid artery is normal in caliber. There is 0 % stenosis using NASCET criteria (normal right ICA caliber is 4.3 mm).    The left internal carotid artery follows a retropharyngeal course. The left internal carotid artery is normal in caliber. There is 0 % stenosis using NASCET criteria (normal left ICA caliber is 4.1 mm).    Bilateral vertebral arteries are normalin course, caliber and contour, without evidence of dissection or occlusion.    Degenerative changes of the bony cervical spine are noted.    BRAIN:  The petrocavernous and supraclinoid ICA segments are normal in caliber.  The visualized MCA and EDGAR branches are normal without evidence of stenosis or aneurysm. The ACOM is present. Bilateral posterior communicating arteries are present.  The vertebral arteries, visualized cerebellar arteries, basilar and bilateral posterior cerebral arteries arepatent without evidence of stenoses or aneurysm. There is no dominant arteriovenous malformation identified in the left parietal lobule at the site of intracranial hemorrhage.    CT venogram:  The superior sagittal sinus, bilateral transverse sinuses, and bilateral sigmoid sinuses are patent and demonstrate contrast enhancement without filling defect to suggest venous thrombosis. The transverse sinuses are codominant.    The vein of Jak, straight sinus, and sinus confluence are patent.    The internal cerebral veins, thalamostriate veins, and basal veins of Nadya are patent.    IMPRESSION:  CT brain:  There is an intraparenchymal hemorrhage in the left parietal lobule with perihemorrhagic edema as described. There is no extension of the hemorrhage into the ventricles, midline shift or herniation.  There is no acute lobar infarction.  CTA brain: There is no large vessel occlusion or aneurysm involving major proximal intracranial arteries. There is no dominant arteriovenous malformation.

## 2021-10-13 NOTE — H&P ADULT - NSHPSOCIALHISTORY_GEN_ALL_CORE
Patient lives at home with her significant other. She has never been a smoker. No alcohol or illicit drug use.

## 2021-10-13 NOTE — ED ADULT NURSE REASSESSMENT NOTE - NS ED NURSE REASSESS COMMENT FT1
Report given to CDU RN, pt in [facility], to be moved over to CDU spot 6.

## 2021-10-13 NOTE — ED ADULT NURSE REASSESSMENT NOTE - NS ED NURSE REASSESS COMMENT FT1
Pt FS 66, provider Kerline made aware, as per MD Churchill "pt can drink juice", pt provided apple juice, will reassess and continue to monitor.

## 2021-10-13 NOTE — H&P ADULT - NSHPPHYSICALEXAM_GEN_ALL_CORE
Vital Signs Last 24 Hrs  T(C): 36.7 (13 Oct 2021 18:14), Max: 36.7 (13 Oct 2021 12:15)  T(F): 98 (13 Oct 2021 18:14), Max: 98.1 (13 Oct 2021 12:15)  HR: 60 (13 Oct 2021 18:14) (60 - 82)  BP: 140/65 (13 Oct 2021 18:14) (132/81 - 140/65)  BP(mean): --  RR: 16 (13 Oct 2021 18:14) (16 - 16)  SpO2: 100% (13 Oct 2021 18:14) (99% - 100%)    General: Obese female in NAD  Neurology: A&Ox3, nonfocal, MCKEON x 4  Eyes: PERRLA/ EOMI, Gross vision intact  ENT/Neck: Neck supple, trachea midline, No JVD, Gross hearing intact  Respiratory: Normal respiratory rate. CTA B/L, No wheezing, rales, rhonchi  CV: RRR, S1S2, no murmurs, rubs or gallops. No LE edema.   Abdominal: Soft, NT, ND +BS  Extremities: + peripheral pulses  Skin: No Rashes, Hematoma, Ecchymosis

## 2021-10-13 NOTE — ED ADULT NURSE NOTE - CHIEF COMPLAINT QUOTE
Pt presents to ED via wheelchair from home with c/o dizziness and feeling "out of it" since yesterday when she woke up. Pt denies past medical hx. Pt denies headache, numbness, tingling, vision changes or other neuro deficits. Gladys Madera (daughter) [phone]

## 2021-10-13 NOTE — H&P ADULT - NSHPREVIEWOFSYSTEMS_GEN_ALL_CORE
REVIEW OF SYSTEMS:    CONSTITUTIONAL: No  fevers or chills  EYES/ENT: No visual changes;    NECK: No pain or stiffness  RESPIRATORY: No cough, wheezing, hemoptysis; No shortness of breath  CARDIOVASCULAR: No chest pain or palpitations  GASTROINTESTINAL: No abdominal or epigastric pain. No nausea, vomiting, or hematemesis; No diarrhea or constipation. No melena or hematochezia.  GENITOURINARY: No dysuria, frequency or hematuria  NEUROLOGICAL: No numbness or weakness  PSYCH: No A/V hallucinations  MSK: No joint pain  SKIN: No itching, rashes

## 2021-10-13 NOTE — H&P ADULT - HISTORY OF PRESENT ILLNESS
56 y.o. F with no significant PMH (although does not follow with doctors) who presents with dizziness, weakness, and disorientation x 2 days. She is also experiencing a HA that began 4 days ago (10/10) and persists while she is in the ED, although with lower severity. She also endorses some weakness on the left side of her body. She denies any numbness, paresthesias, dysphagia, or N/V. No family history of malignancy or CVA. Given persistent symptoms, patient decided to present to the ED for evaluation. Patient continues to endorse dizziness.     In ED, code stroke was called. CT head demonstrates intraparenchymal hemorrhage.

## 2021-10-13 NOTE — ED ADULT NURSE REASSESSMENT NOTE - NS ED NURSE REASSESS COMMENT FT1
Pt received from intake as Code Stroke, pt reports endorsing headache and intermittent confusion for past few days. Pt currently denies confusion, a&ox3, pt NSR on , MD Churchill at bedside assessing pt, will continue to monitor.

## 2021-10-13 NOTE — CONSULT NOTE ADULT - ASSESSMENT
INCOMPLETE    Assessment:  Patient is a 56 year old R-handed female with no known past medical history presents with persistent confusion and resolved severe HA & dizziness. Exam was notable for postural imbalance, otherwise unremarkable. NCCT demonstrates a L parietal intraparenchymal lobar hemorrhage with associated edema.     NIHSS: 1  mRS: 0     Patient is not a tPA candidate because they are outside of the appropriate window of treatment.  Patient is not a mechanical thrombectomy candidate because no evidence of LVO.    Impression: Severe unilateral HA, Transient Dizziness, and Confusion likely 2/2 lobar, intraparenchymal hemorrhage with an unknown mechanism, consider underlying lesion vs cerebral amyloid angiopathy.     Recommendations    Imaging:  [] rCTH in 4 hours  [] rCTH in 24 hours  [] STAT rCTH if acute decline in neuro exam or new severe HA, changes in vision, and/or intractable N/V   [] MRI brain w/w/o contrast   [] vEEG     Meds:  [] BP management as per primary team    Other:  [] ICU admission  [] Neurosurgery consult  [] BP management <160/90 mmHg  [] Neurochecks & Vital signs per unit protocol  [] Fall Precautions  [] Seizure Precautions   [] PT/OT  [] NPO until dysphagia screen  [] S/S     Patient case discussed with stroke fellow,  , under supervision of stroke attending,  . Patient to be seen on morning rounds.        Assessment:  Patient is a 56 year old R-handed female with no known past medical history presents with persistent confusion and resolved severe HA & dizziness. Vital signs wnl. Hemoglobin of 12.7. Exam was notable for mild weakness on the LUE, LLE, and RLE, postural imbalance, otherwise unremarkable. NCCT demonstrates a L parietal intraparenchymal lobar hemorrhage with associated edema, no identified AVM. CTA H/N demonstrates no major stenosis or LVO. CTV demonstrates no high grade stenosis or occlusion of the dural venous sinuses.     NIHSS: 1  mRS: 0     Patient is not a tPA candidate because they are outside of the appropriate window of treatment.  Patient is not a mechanical thrombectomy candidate because no evidence of LVO.    Impression: Severe unilateral HA, Transient Dizziness, Confusion, and RLE monoparesis likely 2/2 L parietal,  lobar, intraparenchymal hemorrhage with an unknown mechanism, consider underlying lesion vs cerebral amyloid angiopathy. Mild L hemiparesis possibly due to L brain dysfunction from the minority of ipsilateral corticospinal tracts that do not decussate.    Recommendations    Imaging:  [] rCTH in 4 hours  [] rCTH in 24 hours  [] STAT rCTH if acute decline in neuro exam or new severe HA, changes in vision, and/or intractable N/V   [] MRI brain w/w/o contrast     Meds:  [] BP management as per primary team    Other:  [] ICU admission  [] Neurosurgery consult  [] BP management <160/90 mmHg  [] Neurochecks & Vital signs per unit protocol  [] Fall Precautions  [] Seizure Precautions   [] PT/OT  [] NPO until dysphagia screen  [] S/S     Patient case discussed with stroke fellow,  , under supervision of stroke attending,  . Patient to be seen on morning rounds.        Assessment:  Patient is a 56 year old R-handed female with no known past medical history presents with persistent confusion and resolved severe HA & dizziness. Vital signs wnl. Hemoglobin of 12.7. Exam was notable for mild weakness on the LUE, LLE, and RLE, postural imbalance, otherwise unremarkable. NCCT demonstrates a L parietal intraparenchymal lobar hemorrhage with associated edema, no identified AVM. CTA H/N demonstrates no major stenosis or LVO. CTV demonstrates no high grade stenosis or occlusion of the dural venous sinuses.     NIHSS: 1  mRS: 0     Patient is not a tPA candidate because they are outside of the appropriate window of treatment.  Patient is not a mechanical thrombectomy candidate because no evidence of LVO.    Impression: Severe unilateral HA, Transient Dizziness, Confusion, and RLE monoparesis likely 2/2 L parietal,  lobar, intraparenchymal hemorrhage with an unknown mechanism, consider underlying lesion vs cerebral amyloid angiopathy. Mild L hemiparesis possibly due to L brain dysfunction from the minority of ipsilateral corticospinal tracts that do not decussate.    Recommendations    Imaging:  [] rCTH in 4 hours  [] rCTH in 24 hours  [] STAT rCTH if acute decline in neuro exam or new severe HA, changes in vision, and/or intractable N/V   [] MRI brain w/w/o contrast     Meds:  [] BP management as per primary team    Other:  [] ICU admission  [] Neurosurgery consult  [] BP management <160/90 mmHg  [] Neurochecks & Vital signs per unit protocol  [] Fall Precautions  [] Seizure Precautions   [] PT/OT  [] NPO until dysphagia screen  [] S/S     Patient case discussed with stroke fellow, Dr. Arteaga, under supervision of stroke attending, Dr. De Paz. Patient to be seen on morning rounds.     Please call with questions: [phone]

## 2021-10-13 NOTE — ED PROVIDER NOTE - OBJECTIVE STATEMENT
56F denies pmh (but has no PCP) presents with dizziness and disorientation x 2 days.  Pt states she feels "out of it", and feels off balance when walking.  Unclear when symptoms started, but states it was sometime yesterday.  Denies fever/chills, cp, sob, n/v/d.  Reports a headache 4 days prior but not currently.  Does not take any medications on a regular basis.

## 2021-10-13 NOTE — H&P ADULT - ASSESSMENT
56 y.o. F with no significant PMH (although does not follow with doctors) who presents with dizziness, weakness, and disorientation x 2 days in the setting of L parietal intraparenchymal lobar hemorrhage.

## 2021-10-14 ENCOUNTER — INPATIENT (INPATIENT)
Facility: HOSPITAL | Age: 56
LOS: 3 days | Discharge: HOME HEALTH | DRG: 64 | End: 2021-10-18
Attending: PSYCHIATRY & NEUROLOGY | Admitting: PSYCHIATRY & NEUROLOGY
Payer: COMMERCIAL

## 2021-10-14 ENCOUNTER — TRANSCRIPTION ENCOUNTER (OUTPATIENT)
Age: 56
End: 2021-10-14

## 2021-10-14 VITALS
TEMPERATURE: 99 F | OXYGEN SATURATION: 99 % | HEART RATE: 67 BPM | RESPIRATION RATE: 16 BRPM | DIASTOLIC BLOOD PRESSURE: 84 MMHG | SYSTOLIC BLOOD PRESSURE: 152 MMHG

## 2021-10-14 VITALS
OXYGEN SATURATION: 99 % | DIASTOLIC BLOOD PRESSURE: 78 MMHG | SYSTOLIC BLOOD PRESSURE: 148 MMHG | HEART RATE: 62 BPM | RESPIRATION RATE: 20 BRPM

## 2021-10-14 DIAGNOSIS — I63.9 CEREBRAL INFARCTION, UNSPECIFIED: ICD-10-CM

## 2021-10-14 LAB
A1C WITH ESTIMATED AVERAGE GLUCOSE RESULT: 5.5 % — SIGNIFICANT CHANGE UP (ref 4–5.6)
ANION GAP SERPL CALC-SCNC: 12 MMOL/L — SIGNIFICANT CHANGE UP (ref 7–14)
BASOPHILS # BLD AUTO: 0.01 K/UL — SIGNIFICANT CHANGE UP (ref 0–0.2)
BASOPHILS NFR BLD AUTO: 0.2 % — SIGNIFICANT CHANGE UP (ref 0–2)
BUN SERPL-MCNC: 12 MG/DL — SIGNIFICANT CHANGE UP (ref 7–23)
CALCIUM SERPL-MCNC: 8.6 MG/DL — SIGNIFICANT CHANGE UP (ref 8.4–10.5)
CHLORIDE SERPL-SCNC: 108 MMOL/L — HIGH (ref 98–107)
CO2 SERPL-SCNC: 21 MMOL/L — LOW (ref 22–31)
COVID-19 SPIKE DOMAIN AB INTERP: POSITIVE
COVID-19 SPIKE DOMAIN ANTIBODY RESULT: >250 U/ML — HIGH
CREAT SERPL-MCNC: 0.83 MG/DL — SIGNIFICANT CHANGE UP (ref 0.5–1.3)
EOSINOPHIL # BLD AUTO: 0.08 K/UL — SIGNIFICANT CHANGE UP (ref 0–0.5)
EOSINOPHIL NFR BLD AUTO: 1.4 % — SIGNIFICANT CHANGE UP (ref 0–6)
ESTIMATED AVERAGE GLUCOSE: 111 — SIGNIFICANT CHANGE UP
GLUCOSE SERPL-MCNC: 92 MG/DL — SIGNIFICANT CHANGE UP (ref 70–99)
HCT VFR BLD CALC: 37.6 % — SIGNIFICANT CHANGE UP (ref 34.5–45)
HGB BLD-MCNC: 11.9 G/DL — SIGNIFICANT CHANGE UP (ref 11.5–15.5)
IANC: 2.88 K/UL — SIGNIFICANT CHANGE UP (ref 1.5–8.5)
IMM GRANULOCYTES NFR BLD AUTO: 0.2 % — SIGNIFICANT CHANGE UP (ref 0–1.5)
LYMPHOCYTES # BLD AUTO: 2.2 K/UL — SIGNIFICANT CHANGE UP (ref 1–3.3)
LYMPHOCYTES # BLD AUTO: 39.6 % — SIGNIFICANT CHANGE UP (ref 13–44)
MAGNESIUM SERPL-MCNC: 2.3 MG/DL — SIGNIFICANT CHANGE UP (ref 1.6–2.6)
MCHC RBC-ENTMCNC: 26.2 PG — LOW (ref 27–34)
MCHC RBC-ENTMCNC: 31.6 GM/DL — LOW (ref 32–36)
MCV RBC AUTO: 82.6 FL — SIGNIFICANT CHANGE UP (ref 80–100)
MONOCYTES # BLD AUTO: 0.37 K/UL — SIGNIFICANT CHANGE UP (ref 0–0.9)
MONOCYTES NFR BLD AUTO: 6.7 % — SIGNIFICANT CHANGE UP (ref 2–14)
NEUTROPHILS # BLD AUTO: 2.88 K/UL — SIGNIFICANT CHANGE UP (ref 1.8–7.4)
NEUTROPHILS NFR BLD AUTO: 51.9 % — SIGNIFICANT CHANGE UP (ref 43–77)
NRBC # BLD: 0 /100 WBCS — SIGNIFICANT CHANGE UP
NRBC # FLD: 0 K/UL — SIGNIFICANT CHANGE UP
PHOSPHATE SERPL-MCNC: 3.7 MG/DL — SIGNIFICANT CHANGE UP (ref 2.5–4.5)
PLATELET # BLD AUTO: 223 K/UL — SIGNIFICANT CHANGE UP (ref 150–400)
POTASSIUM SERPL-MCNC: 4.8 MMOL/L — SIGNIFICANT CHANGE UP (ref 3.5–5.3)
POTASSIUM SERPL-SCNC: 4.8 MMOL/L — SIGNIFICANT CHANGE UP (ref 3.5–5.3)
RBC # BLD: 4.55 M/UL — SIGNIFICANT CHANGE UP (ref 3.8–5.2)
RBC # FLD: 15.3 % — HIGH (ref 10.3–14.5)
SARS-COV-2 IGG+IGM SERPL QL IA: >250 U/ML — HIGH
SARS-COV-2 IGG+IGM SERPL QL IA: POSITIVE
SODIUM SERPL-SCNC: 141 MMOL/L — SIGNIFICANT CHANGE UP (ref 135–145)
WBC # BLD: 5.55 K/UL — SIGNIFICANT CHANGE UP (ref 3.8–10.5)
WBC # FLD AUTO: 5.55 K/UL — SIGNIFICANT CHANGE UP (ref 3.8–10.5)

## 2021-10-14 PROCEDURE — 99223 1ST HOSP IP/OBS HIGH 75: CPT

## 2021-10-14 PROCEDURE — 70450 CT HEAD/BRAIN W/O DYE: CPT | Mod: 26

## 2021-10-14 PROCEDURE — 99255 IP/OBS CONSLTJ NEW/EST HI 80: CPT

## 2021-10-14 PROCEDURE — 70553 MRI BRAIN STEM W/O & W/DYE: CPT | Mod: 26

## 2021-10-14 RX ORDER — ATORVASTATIN CALCIUM 80 MG/1
80 TABLET, FILM COATED ORAL AT BEDTIME
Refills: 0 | Status: DISCONTINUED | OUTPATIENT
Start: 2021-10-14 | End: 2021-10-14

## 2021-10-14 RX ORDER — LEVETIRACETAM 250 MG/1
5 TABLET, FILM COATED ORAL
Qty: 0 | Refills: 0 | DISCHARGE
Start: 2021-10-14

## 2021-10-14 RX ORDER — LEVETIRACETAM 250 MG/1
500 TABLET, FILM COATED ORAL
Refills: 0 | Status: DISCONTINUED | OUTPATIENT
Start: 2021-10-14 | End: 2021-10-18

## 2021-10-14 RX ORDER — INFLUENZA VIRUS VACCINE 15; 15; 15; 15 UG/.5ML; UG/.5ML; UG/.5ML; UG/.5ML
0.5 SUSPENSION INTRAMUSCULAR ONCE
Refills: 0 | Status: DISCONTINUED | OUTPATIENT
Start: 2021-10-14 | End: 2021-10-18

## 2021-10-14 RX ORDER — HYDRALAZINE HCL 50 MG
5 TABLET ORAL
Qty: 0 | Refills: 0 | DISCHARGE
Start: 2021-10-14

## 2021-10-14 RX ORDER — ACETAMINOPHEN 500 MG
1000 TABLET ORAL ONCE
Refills: 0 | Status: COMPLETED | OUTPATIENT
Start: 2021-10-14 | End: 2021-10-14

## 2021-10-14 RX ORDER — ACETAMINOPHEN 500 MG
1000 TABLET ORAL EVERY 8 HOURS
Refills: 0 | Status: COMPLETED | OUTPATIENT
Start: 2021-10-14 | End: 2021-10-14

## 2021-10-14 RX ORDER — HYDROMORPHONE HYDROCHLORIDE 2 MG/ML
0.5 INJECTION INTRAMUSCULAR; INTRAVENOUS; SUBCUTANEOUS ONCE
Refills: 0 | Status: DISCONTINUED | OUTPATIENT
Start: 2021-10-14 | End: 2021-10-14

## 2021-10-14 RX ADMIN — Medication 1000 MILLIGRAM(S): at 07:04

## 2021-10-14 RX ADMIN — Medication 400 MILLIGRAM(S): at 22:24

## 2021-10-14 RX ADMIN — LEVETIRACETAM 400 MILLIGRAM(S): 250 TABLET, FILM COATED ORAL at 06:02

## 2021-10-14 RX ADMIN — Medication 400 MILLIGRAM(S): at 06:32

## 2021-10-14 RX ADMIN — HYDROMORPHONE HYDROCHLORIDE 0.5 MILLIGRAM(S): 2 INJECTION INTRAMUSCULAR; INTRAVENOUS; SUBCUTANEOUS at 09:15

## 2021-10-14 RX ADMIN — LEVETIRACETAM 400 MILLIGRAM(S): 250 TABLET, FILM COATED ORAL at 18:50

## 2021-10-14 RX ADMIN — Medication 1000 MILLIGRAM(S): at 22:39

## 2021-10-14 RX ADMIN — HYDROMORPHONE HYDROCHLORIDE 0.5 MILLIGRAM(S): 2 INJECTION INTRAMUSCULAR; INTRAVENOUS; SUBCUTANEOUS at 09:57

## 2021-10-14 NOTE — OCCUPATIONAL THERAPY INITIAL EVALUATION ADULT - BALANCE DISTURBANCE, IDENTIFIED IMPAIRMENT CONTRIBUTE, REHAB EVAL
Patient received pain medication and reports feeling "loopy"./impaired postural control/decreased strength

## 2021-10-14 NOTE — SWALLOW BEDSIDE ASSESSMENT ADULT - SWALLOW EVAL: DIAGNOSIS
1) Functional oral stage of swallow for solids, puree, and thin liquids marked by adequate acceptance, bolus manipulation, mastication, and collection. Timely oral transit and posterior transfer noted. Adequate oral clearance noted. 2) Functional pharyngeal stage of swallow for solids, puree, and thin liquids marked by initiation of pharyngeal swallow trigger and hyolaryngeal elevation noted upon digital palpation. No overt s/sx of penetration/aspiration noted across all consistencies. 1) Functional oral stage of swallow for solids, puree, and thin liquids marked by adequate acceptance, bolus manipulation, mastication, and coordination. Adequate bolus collection and timely oral transit and posterior transfer noted. Adequate oral clearance noted. 2) Functional pharyngeal stage of swallow for solids, puree, and thin liquids marked by timely pharyngeal swallow trigger and hyolaryngeal elevation noted upon digital palpation. No overt s/sx of penetration/aspiration noted across all consistencies.

## 2021-10-14 NOTE — SWALLOW BEDSIDE ASSESSMENT ADULT - COMMENTS
As per charting, pt is a "56 y.o. F with no significant PMH (although does not follow with doctors) who presents with dizziness, weakness, and disorientation x 2 days. She is also experiencing a HA that began 4 days ago (10/10) and persists while she is in the ED, although with lower severity. She also endorses some weakness on the left side of her body. She denies any numbness, paresthesias, dysphagia, or N/V. No family history of malignancy or CVA. Given persistent symptoms, patient decided to present to the ED for evaluation. Patient continues to endorse dizziness."    CT Brain Stroke Protocol 10/13 revealed "intraparenchymal hemorrhage in the left parietal lobule with perihemorrhagic edema as described. There is no extension of the hemorrhage into the ventricles, midline shift or herniation." CXR 10/13 revealed "No focal consolidation."    Consult received and chart reviewed. Pt seen at bedside, awake/alert and oriented. Pt able to follow directions and answer questions appropriately. Pt engaged in conversational discourse with the SLP, and was cooperative throughout the assessment. As per charting, pt is a "56 y.o. F with no significant PMH (although does not follow with doctors) who presents with dizziness, weakness, and disorientation x 2 days. She is also experiencing a HA that began 4 days ago (10/10) and persists while she is in the ED, although with lower severity. She also endorses some weakness on the left side of her body. She denies any numbness, paresthesias, dysphagia, or N/V. No family history of malignancy or CVA. Given persistent symptoms, patient decided to present to the ED for evaluation. Patient continues to endorse dizziness."    CT Brain Stroke Protocol 10/13 revealed "intraparenchymal hemorrhage in the left parietal lobule with perihemorrhagic edema as described. There is no extension of the hemorrhage into the ventricles, midline shift or herniation." CXR 10/13 revealed "No focal consolidation."    Consult received and chart reviewed. Pt seen at bedside, awake/alert and oriented. Pt able to follow directions and answer questions appropriately. Pt engaged in conversational discourse with the SLP, and was cooperative throughout the assessment. Pt denied feeding/swallowing difficulties. Pt reported left sided facial weakness and left-sided headache.

## 2021-10-14 NOTE — CONSULT NOTE ADULT - ASSESSMENT
56 y.o. AAF with no known medical hx p/w dizziness and weakness a/w HA found to have L parietal lobe IPH  CTH L Parietal lobe IPH  CTA H/N neg  CTV neg    A1c 5.5  o/e ? R field cut inconsistant     Etiology of IPH unclear needs further workup. Lobar so atypical for HTN IPH but still 10-20% of lobar hemorrahges can be HTN in nautre. r/o AVM and lesion/mass  - MRI brain w/ adn w/o f/u   - transfer to NS for cerebral angio with Dr. pringle  - SBP <160  - HA BP control  - EEG  - hold AP/AC/NSAIDS. dvt ppx okay  - lipid panel  - TTE  - telemetry  - PT/OT/SS/SLP, OOBC  - permissive HTN, -180mmHg.  - check FS, glucose control <180  - GI/DVT ppx  - Counseling on diet, exercise, and medication adherence was done  - Counseling on smoking cessation and alcohol consumption offered when appropriate.  - Pain assessed and judicious use of narcotics when appropriate was discussed.    - Stroke education given when appropriate.  - Importance of fall prevention discussed.   - Differential diagnosis and plan of care discussed with patient and/or family and primary team  - Thank you for allowing me to participate in the care of this patient. Call with questions.   Alex Gan MD  Vascular Neurology  Office: [phone]

## 2021-10-14 NOTE — PROGRESS NOTE ADULT - NSPROGADDITIONALINFOA_GEN_ALL_CORE
Differential diagnosis and plan of care discussed with patient after the evaluation.   Advanced care planning/advanced directives discussed with patient/family. DNR status including forceful chest compressions to attempt to restart the heart, ventilator support/artificial breathing, electric shock, artificial nutrition, health care proxy, Molst form all discussed with pt. Pt wishes to consider.   OMT on six regions for acute somatic dysfunctions done at the bedside   Importance of Fall prevention discussed. I had a prolonged conversation with patient. More than 50% of the visit was spent counseling and/or coordinating care by the attending physician.  total of one hundred and twenty mins spent on total encounter

## 2021-10-14 NOTE — PROGRESS NOTE ADULT - SUBJECTIVE AND OBJECTIVE BOX
Name of Patient : HARRIETT SHANNON  MRN: [id number]  Date of visit: 10-14-21       Subjective: Patient seen and examined. No new events except as noted.     REVIEW OF SYSTEMS:    CONSTITUTIONAL: No weakness, fevers or chills  EYES/ENT: No visual changes;  No vertigo or throat pain   NECK: No pain or stiffness  RESPIRATORY: No cough, wheezing, hemoptysis; No shortness of breath  CARDIOVASCULAR: No chest pain or palpitations  GASTROINTESTINAL: No abdominal or epigastric pain. No nausea, vomiting, or hematemesis; No diarrhea or constipation. No melena or hematochezia.  GENITOURINARY: No dysuria, frequency or hematuria  NEUROLOGICAL: No numbness or weakness  SKIN: No itching, burning, rashes, or lesions   All other review of systems is negative unless indicated above.    MEDICATIONS:  MEDICATIONS  (STANDING):  levETIRAcetam  IVPB 500 milliGRAM(s) IV Intermittent every 12 hours      PHYSICAL EXAM:  T(C): 36.9 (10-14-21 @ 15:35), Max: 36.9 (10-13-21 @ 22:46)  HR: 61 (10-14-21 @ 15:35) (60 - 66)  BP: 144/86 (10-14-21 @ 15:35) (136/67 - 150/87)  RR: 17 (10-14-21 @ 15:35) (16 - 21)  SpO2: 100% (10-14-21 @ 15:35) (97% - 100%)  Wt(kg): --  I&O's Summary        Appearance: Normal	  HEENT:  PERRLA   Lymphatic: No lymphadenopathy   Cardiovascular: Normal S1 S2, no JVD  Respiratory: normal effort , clear  Gastrointestinal:  Soft, Non-tender  Skin: No rashes,  warm to touch  Psychiatry:  Mood & affect appropriate  Musculuskeletal: No edema      All labs, Imaging and EKGs personally reviewed                           11.9   5.55  )-----------( 223      ( 14 Oct 2021 07:24 )             37.6               10-14    141  |  108<H>  |  12  ----------------------------<  92  4.8   |  21<L>  |  0.83    Ca    8.6      14 Oct 2021 07:24  Phos  3.7     10-14  Mg     2.30     10-14    TPro  7.7  /  Alb  3.8  /  TBili  0.2  /  DBili  x   /  AST  22  /  ALT  13  /  AlkPhos  74  10-13    PT/INR - ( 13 Oct 2021 13:50 )   PT: 11.9 sec;   INR: 1.04 ratio         PTT - ( 13 Oct 2021 13:50 )  PTT:36.7 sec                   Urinalysis Basic - ( 13 Oct 2021 15:24 )    Color: Light Yellow / Appearance: Clear / SG: >1.050 / pH: x  Gluc: x / Ketone: Negative  / Bili: Negative / Urobili: <2 mg/dL   Blood: x / Protein: Trace / Nitrite: Negative   Leuk Esterase: Negative / RBC: 1 /HPF / WBC 14 /HPF   Sq Epi: x / Non Sq Epi: 6 /HPF / Bacteria: Few                         Name of Patient : HARRIETT SHANNON  MRN: [id number]  Date of visit: 10-14-21       Subjective: Patient seen and examined. No new events except as noted.   + headache     REVIEW OF SYSTEMS:    CONSTITUTIONAL: No weakness, fevers or chills  EYES/ENT: No visual changes;  No vertigo or throat pain   NECK: No pain or stiffness  RESPIRATORY: No cough, wheezing, hemoptysis; No shortness of breath  CARDIOVASCULAR: No chest pain or palpitations  GASTROINTESTINAL: No abdominal or epigastric pain.   GENITOURINARY: No dysuria, frequency or hematuria  NEUROLOGICAL: No numbness or weakness  SKIN: No itching, burning, rashes, or lesions   All other review of systems is negative unless indicated above.    MEDICATIONS:  MEDICATIONS  (STANDING):  levETIRAcetam  IVPB 500 milliGRAM(s) IV Intermittent every 12 hours      PHYSICAL EXAM:  T(C): 36.9 (10-14-21 @ 15:35), Max: 36.9 (10-13-21 @ 22:46)  HR: 61 (10-14-21 @ 15:35) (60 - 66)  BP: 144/86 (10-14-21 @ 15:35) (136/67 - 150/87)  RR: 17 (10-14-21 @ 15:35) (16 - 21)  SpO2: 100% (10-14-21 @ 15:35) (97% - 100%)  Wt(kg): --  I&O's Summary        Appearance: Normal	  HEENT:  PERRLA   Lymphatic: No lymphadenopathy   Cardiovascular: Normal S1 S2, no JVD  Respiratory: normal effort , clear  Gastrointestinal:  Soft, Non-tender  Skin: No rashes,  warm to touch  Psychiatry:  Mood & affect appropriate  Musculuskeletal: No edema      All labs, Imaging and EKGs personally reviewed                           11.9   5.55  )-----------( 223      ( 14 Oct 2021 07:24 )             37.6               10-14    141  |  108<H>  |  12  ----------------------------<  92  4.8   |  21<L>  |  0.83    Ca    8.6      14 Oct 2021 07:24  Phos  3.7     10-14  Mg     2.30     10-14    TPro  7.7  /  Alb  3.8  /  TBili  0.2  /  DBili  x   /  AST  22  /  ALT  13  /  AlkPhos  74  10-13    PT/INR - ( 13 Oct 2021 13:50 )   PT: 11.9 sec;   INR: 1.04 ratio         PTT - ( 13 Oct 2021 13:50 )  PTT:36.7 sec                   Urinalysis Basic - ( 13 Oct 2021 15:24 )    Color: Light Yellow / Appearance: Clear / SG: >1.050 / pH: x  Gluc: x / Ketone: Negative  / Bili: Negative / Urobili: <2 mg/dL   Blood: x / Protein: Trace / Nitrite: Negative   Leuk Esterase: Negative / RBC: 1 /HPF / WBC 14 /HPF   Sq Epi: x / Non Sq Epi: 6 /HPF / Bacteria: Few                         Name of Patient : HARRIETT SHANNON  MRN: [id number]  Date of visit: 10-14-21       Subjective: Patient seen and examined. No new events except as noted.   + headache     REVIEW OF SYSTEMS:    CONSTITUTIONAL: No weakness, fevers or chills  EYES/ENT: No visual changes;  No vertigo or throat pain   NECK: No pain or stiffness  RESPIRATORY: No cough, wheezing, hemoptysis; No shortness of breath  CARDIOVASCULAR: No chest pain or palpitations  GASTROINTESTINAL: No abdominal or epigastric pain.   GENITOURINARY: No dysuria, frequency or hematuria  NEUROLOGICAL: No numbness or weakness  SKIN: No itching, burning, rashes, or lesions   All other review of systems is negative unless indicated above.    MEDICATIONS:  MEDICATIONS  (STANDING):  levETIRAcetam  IVPB 500 milliGRAM(s) IV Intermittent every 12 hours      PHYSICAL EXAM:  T(C): 36.9 (10-14-21 @ 15:35), Max: 36.9 (10-13-21 @ 22:46)  HR: 61 (10-14-21 @ 15:35) (60 - 66)  BP: 144/86 (10-14-21 @ 15:35) (136/67 - 150/87)  RR: 17 (10-14-21 @ 15:35) (16 - 21)  SpO2: 100% (10-14-21 @ 15:35) (97% - 100%)  Wt(kg): --  I&O's Summary        Appearance: Normal	  HEENT:  PERRLA   Lymphatic: No lymphadenopathy   Cardiovascular: Normal S1 S2, no JVD  Respiratory: normal effort , clear  Gastrointestinal:  Soft, Non-tender  Skin: No rashes,  warm to touch  Psychiatry:  Mood & affect appropriate  Musculuskeletal: No edema      All labs, Imaging and EKGs personally reviewed                           11.9   5.55  )-----------( 223      ( 14 Oct 2021 07:24 )             37.6               10-14    141  |  108<H>  |  12  ----------------------------<  92  4.8   |  21<L>  |  0.83    Ca    8.6      14 Oct 2021 07:24  Phos  3.7     10-14  Mg     2.30     10-14    TPro  7.7  /  Alb  3.8  /  TBili  0.2  /  DBili  x   /  AST  22  /  ALT  13  /  AlkPhos  74  10-13    PT/INR - ( 13 Oct 2021 13:50 )   PT: 11.9 sec;   INR: 1.04 ratio         PTT - ( 13 Oct 2021 13:50 )  PTT:36.7 sec                   Urinalysis Basic - ( 13 Oct 2021 15:24 )    Color: Light Yellow / Appearance: Clear / SG: >1.050 / pH: x  Gluc: x / Ketone: Negative  / Bili: Negative / Urobili: <2 mg/dL   Blood: x / Protein: Trace / Nitrite: Negative   Leuk Esterase: Negative / RBC: 1 /HPF / WBC 14 /HPF   Sq Epi: x / Non Sq Epi: 6 /HPF / Bacteria: Few    < from: MR Head w/wo IV Cont (10.14.21 @ 16:00) >  FINDINGS:  VENTRICLES AND SULCI: Subtlemass effect on the posterior body of the left lateral ventricle  INTRA-AXIAL:  Left parietal parasagittal acute hemorrhage with surrounding edema. No associated enhancement to suggest an underlying mass lesion. No evidence of vascular hypertrophy  EXTRA-AXIAL:  Subtle subarachnoid hemorrhage is suggested in the territory of the hemorrhage  VISUALIZED SINUSES:  Normal.  VISUALIZED MASTOIDS:  Clear.  CALVARIUM:  Normal.  CAROTID FLOW VOIDS:  Present.  MISCELLANEOUS:  None.    IMPRESSION:  Left parietal parasagittal acute hematoma with surrounding edema in the distribution of the left PCA territory. No abnormal enhancement in association. No vascular hypertrophy    < from: CT Head No Cont (10.14.21 @ 10:01) >  IMPRESSION:    -Stable left parietal parenchymal hemorrhage measuring up to 3.7 cm and adjacent subarachnoid hemorrhage. No new or worsening intracranial hemorrhage.

## 2021-10-14 NOTE — OCCUPATIONAL THERAPY INITIAL EVALUATION ADULT - GENERAL OBSERVATIONS, REHAB EVAL
Patient received seated at edge of bed in [facility] and was agreeable to participate in OT evaluation.

## 2021-10-14 NOTE — PHYSICAL THERAPY INITIAL EVALUATION ADULT - PERTINENT HX OF CURRENT PROBLEM, REHAB EVAL
56 year old female with no significant PMH (although does not follow with doctors) who presents with dizziness, weakness, and disorientation x 2 days. She is also experiencing a HA that began 4 days ago (10/10) and persists while she is in the ED, although with lower severity. She also endorses some weakness on the left side of her body.In ED, code stroke was called. CT head demonstrates intraparenchymal hemorrhage.

## 2021-10-14 NOTE — H&P ADULT - HISTORY OF PRESENT ILLNESS
56 y.o. R-H F with no PMH presented to [facility] as a transfer from [facility] for angiogram. Initially presented as a code stroke at [facility] on 10/13 with unknown LKN. Pt started to have disorientation for 3 days with confusion. Also endorsed headache that rated as severe as 10/10 at that time but improved over the 3 days. Unclear whether had full vision loss but complained of possible R peripheral vision problems.     At [facility], CTH demonstrated L pareital IPH measuring up to 3.7 cm and adjacent subarachnoid hemorrhage. CTA demonstrated no LVO. CTP was not performed. MR brain showed consistent L parietal parasgaittal acute  56 y.o. R-H F with no PMH presented to [facility] as a transfer from [facility] for angiogram. Initially presented as a code stroke at [facility] on 10/13 with unknown LKN. Pt started to have disorientation for 3 days with confusion. Also endorsed headache that rated as severe as 10/10 at that time but improved over the 3 days. Unclear whether had full vision loss but complained of possible R peripheral vision problems. Also was experiencing room-spinning sensation that prevented her from walking at home. This vertigo was worsened by any eye movements. Currently has mild frontal headache that goes across the forehead with mild pressure behind the eyes. Headache is not pulsating or throbbing. Light worsens the headache but position whether laying or standing does not affect the intensity of the headache. Denies any lightheadedness or room-spinning sensation, floaters, black dots, curtains on the vision, n/v, abd pain, chest pain, pain/numbness/tingling in all extremities. Lives at home, does not need help for ADLs, walks independently at baseline, never smoker, no EtOH or illicit drug use.     At [facility], CTH demonstrated L pareital IPH measuring up to 3.7 cm and adjacent subarachnoid hemorrhage. CTA demonstrated no LVO. CTP was not performed. MR brain showed consistent L parietal parasagittal acute hematoma with surrounding edema in L PCA territory. Dr. De Paz and Dr. Gan saw the pt and recommended transfer for agiongram with Dr. Reagan.  56 y.o. R-H F with no PMH presented to [facility] as a transfer from [facility] for angiogram. Initially presented as a code stroke at [facility] on 10/13 with unknown LKN. Pt started to have disorientation for 3 days. Also endorsed headache that rated as severe as 10/10 at that time but improved over the 3 days. Unclear whether had full vision loss but complained of possible R peripheral vision problems. Also was experiencing room-spinning sensation that prevented her from walking at home. This vertigo was worsened by any eye movements. Currently has mild frontal headache that goes across the forehead with mild pressure behind the eyes. Headache is not pulsating or throbbing. Light worsens the headache but position whether laying or standing does not affect the intensity of the headache. Denies any lightheadedness or room-spinning sensation, floaters, black dots, curtains on the vision, n/v, abd pain, chest pain, pain/numbness/tingling in all extremities. Lives at home, does not need help for ADLs, walks independently at baseline, never smoker, no EtOH or illicit drug use.     At [facility], CTH demonstrated L pareital IPH measuring up to 3.7 cm and adjacent subarachnoid hemorrhage. CTA demonstrated no LVO. CTP was not performed. MR brain showed consistent L parietal parasagittal acute hematoma with surrounding edema in L PCA territory. Dr. De Paz and Dr. Gan saw the pt and recommended transfer for agiongram with Dr. Reagan.

## 2021-10-14 NOTE — DISCHARGE NOTE PROVIDER - CARE PROVIDER_API CALL
Anna William)  [facility] Neurosurgery  General  [address], [address]  [place] [address]  Phone: [phone]  Fax: [phone]  Follow Up Time:

## 2021-10-14 NOTE — CONSULT NOTE ADULT - SUBJECTIVE AND OBJECTIVE BOX
Neurology Consult    Reason for Consult: Patient is a 56y old  Female who presents with a chief complaint of Weakness (14 Oct 2021 15:51)      HPI:  56 y.o. AAF with no significant PMH (although does not follow with doctors) who presents with dizziness, weakness, and disorientation x 2 days. She is also experiencing a HA that began 4 days ago (10/10) and persists while she is in the ED, although with lower severity. She also endorses some weakness on the left side of her body. She denies any numbness, paresthesias, dysphagia, or N/V. No family history of malignancy or CVA. Given persistent symptoms, patient decided to present to the ED for evaluation. Patient continues to endorse dizziness.     In ED, code stroke was called. CT head demonstrates intraparenchymal hemorrhage.  (13 Oct 2021 18:32)       PAST MEDICAL & SURGICAL HISTORY:  No pertinent past medical history    No significant past surgical history        Allergies: Allergies    No Known Allergies    Intolerances        Social History: Denies toxic habits including tobacco, ETOH or illicit drugs.    Family History: FAMILY HISTORY:  FH: hypertension (Father)    . No family history of strokes    Medications: MEDICATIONS  (STANDING):  levETIRAcetam  IVPB 500 milliGRAM(s) IV Intermittent every 12 hours    MEDICATIONS  (PRN):  acetaminophen   Tablet .. 650 milliGRAM(s) Oral every 6 hours PRN Temp greater or equal to 38C (100.4F), Mild Pain (1 - 3)  aluminum hydroxide/magnesium hydroxide/simethicone Suspension 30 milliLiter(s) Oral every 4 hours PRN Dyspepsia  hydrALAZINE Injectable 5 milliGRAM(s) IV Push every 6 hours PRN SBP >160 or DBP >90  melatonin 3 milliGRAM(s) Oral at bedtime PRN Insomnia  ondansetron Injectable 4 milliGRAM(s) IV Push every 8 hours PRN Nausea and/or Vomiting      Review of Systems:  CONSTITUTIONAL:  No weight loss, fever, chills, weakness or fatigue.  HEENT:  Eyes:  No visual loss, blurred vision, double vision or yellow sclera. Ears, Nose, Throat:  No hearing loss, sneezing, congestion, runny nose or sore throat.  SKIN:  No rash or itching.  CARDIOVASCULAR:  No chest pain, chest pressure or chest discomfort. No palpitations or edema.  RESPIRATORY:  No shortness of breath, cough or sputum.  GASTROINTESTINAL:  No anorexia, nausea, vomiting or diarrhea. No abdominal pain or blood.  GENITOURINARY:  No burning on urination or incontinence   NEUROLOGICAL:  +headache, +dizziness, no syncope, paralysis, ataxia, numbness or tingling in the extremities. No change in bowel or bladder control. no limb weakness. no vision changes.   MUSCULOSKELETAL:  No muscle, back pain, joint pain or stiffness.  HEMATOLOGIC:  No anemia, bleeding or bruising.  LYMPHATICS:  No enlarged nodes. No history of splenectomy.  PSYCHIATRIC:  No history of depression or anxiety.  ENDOCRINOLOGIC:  No reports of sweating, cold or heat intolerance. No polyuria or polydipsia.      Vitals:  Vital Signs Last 24 Hrs  T(C): 36.9 (14 Oct 2021 15:35), Max: 36.9 (13 Oct 2021 22:46)  T(F): 98.4 (14 Oct 2021 15:35), Max: 98.5 (13 Oct 2021 22:46)  HR: 61 (14 Oct 2021 15:35) (60 - 66)  BP: 144/86 (14 Oct 2021 15:35) (136/67 - 150/87)  BP(mean): --  RR: 17 (14 Oct 2021 15:35) (16 - 21)  SpO2: 100% (14 Oct 2021 15:35) (97% - 100%)    General Exam:   General Appearance: Appropriately dressed and in no acute distress       Head: Normocephalic, atraumatic and no dysmorphic features  Ear, Nose, and Throat: Moist mucous membranes  CVS: S1S2+  Resp: No SOB, no wheeze or rhonchi  GI: soft NT/ND  Extremities: No edema or cyanosis  Skin: No bruises or rashes     Neurological Exam:  Mental Status: Awake, alert and oriented x 3.  Able to follow simple and complex verbal commands. Able to name and repeat. fluent speech. No obvious aphasia or dysarthria noted.   Cranial Nerves: PERRL, EOMI, VFFC, sensation V1-V3 intact,  no obvious facial asymmetry, equal elevation of palate, scm/trap 5/5, tongue is midline on protrusion. no obvious papilledema on fundoscopic exam. hearing is grossly intact.   Motor: Normal bulk, tone and strength throughout. Fine finger movements were intact and symmetric. no tremors or drift noted.    Sensation: Intact to light touch and pinprick throughout. no right/left confusion. no extinction to tactile on DSS. Romberg was negative.   Reflexes: 1+ throughout at biceps, brachioradialis, triceps, patellars and ankles bilaterally and equal. No clonus. R toe and L toe were both downgoing.  Coordination: No dysmetria on FNF    Gait: deferred in ED     Data/Labs/Imaging which I personally reviewed.     Labs:     CBC Full  -  ( 14 Oct 2021 07:24 )  WBC Count : 5.55 K/uL  RBC Count : 4.55 M/uL  Hemoglobin : 11.9 g/dL  Hematocrit : 37.6 %  Platelet Count - Automated : 223 K/uL  Mean Cell Volume : 82.6 fL  Mean Cell Hemoglobin : 26.2 pg  Mean Cell Hemoglobin Concentration : 31.6 gm/dL  Auto Neutrophil # : 2.88 K/uL  Auto Lymphocyte # : 2.20 K/uL  Auto Monocyte # : 0.37 K/uL  Auto Eosinophil # : 0.08 K/uL  Auto Basophil # : 0.01 K/uL  Auto Neutrophil % : 51.9 %  Auto Lymphocyte % : 39.6 %  Auto Monocyte % : 6.7 %  Auto Eosinophil % : 1.4 %  Auto Basophil % : 0.2 %    10-14    141  |  108<H>  |  12  ----------------------------<  92  4.8   |  21<L>  |  0.83    Ca    8.6      14 Oct 2021 07:24  Phos  3.7     10-14  Mg     2.30     10-14    TPro  7.7  /  Alb  3.8  /  TBili  0.2  /  DBili  x   /  AST  22  /  ALT  13  /  AlkPhos  74  10-13    LIVER FUNCTIONS - ( 13 Oct 2021 13:50 )  Alb: 3.8 g/dL / Pro: 7.7 g/dL / ALK PHOS: 74 U/L / ALT: 13 U/L / AST: 22 U/L / GGT: x           PT/INR - ( 13 Oct 2021 13:50 )   PT: 11.9 sec;   INR: 1.04 ratio         PTT - ( 13 Oct 2021 13:50 )  PTT:36.7 sec  Urinalysis Basic - ( 13 Oct 2021 15:24 )    Color: Light Yellow / Appearance: Clear / SG: >1.050 / pH: x  Gluc: x / Ketone: Negative  / Bili: Negative / Urobili: <2 mg/dL   Blood: x / Protein: Trace / Nitrite: Negative   Leuk Esterase: Negative / RBC: 1 /HPF / WBC 14 /HPF   Sq Epi: x / Non Sq Epi: 6 /HPF / Bacteria: Few    < from: CT Brain Stroke Protocol (10.13.21 @ 13:57) >    EXAM:  CT VENOGRAM BRAIN (W)AW IC      EXAM:  CT ANGIO BRAIN (W)AW IC      EXAM:  CT ANGIO NECK (W)AW IC      EXAM:  CT BRAIN STROKE PROTOCOL        PROCEDURE DATE:  Oct 13 2021         INTERPRETATION:  CT OF THE HEAD WITHOUT CONTRAST AND CT ANGIOGRAPHY OF HEAD AND NECK WITH INTRAVENOUS CONTRAST. CT VENOGRAM OF THE BRAIN WAS ALSO PERFORMED.    CLINICAL INDICATION: Code stroke patient, patient presented in balance..    TECHNIQUE: Initially volumetric CT acquisition was performed through the brain and reviewed using brain and bone window technique. Sagittal and coronal reconstructed images were obtained. Dose optimization techniques were utilized including kVp/mA modulation along with iterative reconstructions.  Subsequently volumetric acquisition was performed from the thoracic inlet to the vertex. Thin axial CTV radiographic images of the brain were obtained after intravenous contrast administration.    A total of 90 cc of Omnipaque 350 was injected intravenously without complications. 10 cc of intervenous contrast was discarded. Dose optimization techniques were utilized including kVp/mA modulation along with iterative reconstructions.  MIP image analysis was performed on a separate workstation.      COMPARISON: No prior studies have been submitted for comparison.    FINDINGS:    Head CT:    There is intraparenchymal hemorrhage in the left parietal lobule measuring 2.5 x 1.7 x 3.4 cm in TRV, AP and craniocaudal dimensions. There is no extension of the hemorrhage into the ventricles. There is perihemorrhagic edema without shift or herniation. There is no significant mass effect on the left side of the splenium of the corpus callosum or in the posterior horn of the left lateral ventricle.    The ventricles, cisterns and sulciare normal in size, shape. With the exception of the above findings, the gray-white matter junction is well preserved. There is no abnormal extra-axial fluid collection or hydrocephalus.    The visualized globes are symmetric bilaterally. The paranasal sinuses and tympanomastoid air cells are clear.    CT angiography:    NECK:  The aortic arch is conventional in anatomy. Origin of supraaortic arteries are patent. The common carotid arteries are normal in course and caliber. There are atherosclerotic plaques at common carotid bifurcations and carotid bulbs without evidence of significant segmental stenosis.    The right internal carotid artery is normal in caliber. There is 0 % stenosis using NASCET criteria (normal right ICA caliber is 4.3 mm).    The left internal carotid artery follows a retropharyngeal course. The left internal carotid artery is normal in caliber. There is 0 % stenosis using NASCET criteria (normal left ICA caliber is 4.1 mm).    Bilateral vertebral arteries are normalin course, caliber and contour, without evidence of dissection or occlusion.    Degenerative changes of the bony cervical spine are noted.    BRAIN:    The petrocavernous and supraclinoid ICA segments are normal in caliber.  The visualized MCA and EDGAR branches are normal without evidence of stenosis or aneurysm. The ACOM is present. Bilateral posterior communicating arteries are present.  The vertebral arteries, visualized cerebellar arteries, basilar and bilateral posterior cerebral arteries arepatent without evidence of stenoses or aneurysm. There is no dominant arteriovenous malformation identified in the left parietal lobule at the site of intracranial hemorrhage.    None.    CT venogram:  The superior sagittal sinus, bilateral transverse sinuses, and bilateral sigmoid sinuses are patent and demonstrate contrast enhancement without filling defect to suggest venous thrombosis. The transverse sinuses are codominant.    The vein of Jak, straight sinus, and sinus confluence are patent.    The internal cerebral veins, thalamostriate veins, and basal veins of Nadya are patent.      IMPRESSION:    CT brain:    There is an intraparenchymal hemorrhage in the left parietal lobule with perihemorrhagic edema as described. There is no extension of the hemorrhage into the ventricles, midline shift or herniation.    There is no acute lobar infarction.    CTA brain: There is no large vessel occlusion or aneurysm involving major proximal intracranial arteries. There is no dominant arteriovenous malformation.    CTA NECK: There is no stenosis involving major neck arteries.    CT venogram: There is no high-grade stenosis or occlusion within the dural venous sinuses.    NASCET CAROTID STENOSIS CRITERIA (distal normal appearing ICA as denominator for measurement): 0%-none, 1-49%-mild, 50-70%-moderate, 70-89%-severe, 90-99%-critical.    Notification to clinician of alert:    Provider   Dr. Smiley was notified about the final results at 10/13/2021 1:56 PM and Dr. Giovany was notified aboutthe final results at 1410 hours, 10/13/2021 via telephone by neuroradiologist BRIANNE Encinas. Readback confirmation was obtained.      --- End of Report ---              MALISSA ENCINAS MD; Attending Radiologist  This document has been electronically signed. Oct 13 2021  2:40PM    < end of copied text >  < from: CT Head No Cont (10.13.21 @ 19:50) >    EXAM:  CT BRAIN        PROCEDURE DATE:  Oct 13 2021         INTERPRETATION:  CT head without IV contrast    CLINICAL INFORMATION: Intracranial hemorrhage.    TECHNIQUE: Contiguous axial 5 mm sections were obtained through the head. Sagittal and coronal 2-D reformatted images were also obtained.   This scan was performed using automatic exposure control (radiation dose reduction software) to obtain a diagnostic image quality scan with patient dose as low as reasonably achievable.    FINDINGS:   CT dated 10/13/2021 available for review.    The brain demonstrates unchanged 2.5 cm parenchymal hematoma in the LEFT parietal lobe with minimal subdural hemorrhage along the posterior falx.   No acute cerebral cortical infarct is seen. No mass effectis found in the brain.    The ventricles, sulci and basal cisterns appear unremarkable.    The orbits are unremarkable.  The paranasal sinuses are clear.  The nasal cavity appears intact.  The nasopharynx is symmetric.  The central skull base, petrous temporal bones and calvarium remain intact.      IMPRESSION:   unchanged 2.5 cm parenchymal hematoma in the LEFT parietal lobe with minimal subdural hemorrhage along the posterior falx.    --- End of Report ---              JARRETT VUONG MD; Attending Radiologist  This document has been electronically signed. Oct 13 2021  8:41PM    < end of copied text >  < from: CT Head No Cont (10.14.21 @ 10:01) >    EXAM:  CT BRAIN        PROCEDURE DATE:  Oct 14 2021         INTERPRETATION:  CLINICAL INDICATION: headache, dizzy, eval bleed if worsening.    TECHNIQUE: CT of the head was performed without the administration of intravenous contrast.    COMPARISON: CT head 10/13/2021.    FINDINGS:  Stable left medial parietal parenchymal hemorrhage measuring 2.5 x 1.7 x 3.7 cm with surrounding vasogenic edema. Again noted trace subarachnoid hemorrhage just posterior to this. No new intracranial hemorrhage.    No hydrocephalus.    The visualized intraorbital contents are unremarkable. The imaged portions of the paranasal sinuses are clear. The mastoid air cells are clear. The visualized soft tissues and osseous structures appear normal.    IMPRESSION:    -Stable left parietal parenchymal hemorrhage measuring up to 3.7 cm and adjacent subarachnoid hemorrhage. No new or worsening intracranial hemorrhage.    --- End of Report ---              RIGOBERTO GRANT MD; Attending Radiologist  This document has been electronically signed. Oct 14 2021 10:24AM    < end of copied text >

## 2021-10-14 NOTE — PROGRESS NOTE ADULT - ASSESSMENT
56 y.o. R-H F with no PMH presented to [facility] as a transfer from [facility] for angiogram. Initially presented as a code stroke at [facility] on 10/13 with unknown LKN. Pt started to have disorientation, gait instability, vertigo, ? R temporal vision loss for 3 days. Current NIHSS 1 (mild L facial droop) and mRS 0. Imaging studies at [facility] showed L parietal parasagittal acute hematoma with surrounding edema. Neuro exam at this time shows mild L facial droop, otherwise unremarkable. Labs from [facility] reveal + UA with few bacteria and WBC 14.     Impression: Subacute vertigo, gait instability, and b/l frontal headache likely due to L parietal parasagittal acute hematoma. Localization is L PCA territory. Etiology for hematoma include AVM vs HTN vs trauma vs r/o neoplastic     Recommendations:  [] If sudden mental status changes, would repeat CTH , noted repeat CT no acute changes   [] Would hold DVT ppx for now   [] Would hold statin for now  [] Symptomatic tx for headache with IV tylenol  [] Plan for angiogram with Dr. Reagan  [] TTE  [] +/- ILR depending on TTE        Labs  [] CBC, BMP, and lipid profile     Other  [] Telemonitoring;  Neurochecks and Vital signs per unit protocol  [] Permissive HTN up to SBP < 160 mmHg  [] BGM goals 140-180  [] PT/OT evaluation  [] resume diet, passed speech and swallow   [] Stroke education provided   56 y.o. R-H F with no PMH presented to [facility] as a transfer from [facility] for angiogram. Initially presented as a code stroke at [facility] on 10/13 with unknown LKN. Pt started to have disorientation, gait instability, vertigo, ? R temporal vision loss for 3 days. Current NIHSS 1 (mild L facial droop) and mRS 0. Imaging studies at [facility] showed L parietal parasagittal acute hematoma with surrounding edema. Neuro exam at this time shows mild L facial droop, otherwise unremarkable. Labs from [facility] reveal + UA with few bacteria and WBC 14.     Impression: Subacute vertigo, gait instability, and b/l frontal headache likely due to L parietal parasagittal acute hematoma. Localization is L PCA territory. Etiology for hematoma include AVM vs HTN vs trauma vs r/o neoplastic     Recommendations:  [] If sudden mental status changes, would repeat CTH , noted repeat CT no acute changes   [] Would hold DVT ppx for now   [] Would hold statin for now  [] Symptomatic tx for headache with IV tylenol  [] Plan for angiogram with Dr. Reagan  [] TTE  [] +/- ILR depending on TTE  [] MRI brain noted   [] Card eval for BP control         Labs  [] CBC, BMP, and lipid profile noted     Other  [] Telemonitoring;  Neurochecks and Vital signs per unit protocol  [] Permissive HTN up to SBP < 160 mmHg  [] BGM goals 140-180  [] PT/OT evaluation  [] resume diet, passed speech and swallow   [] Stroke education provided

## 2021-10-14 NOTE — DISCHARGE NOTE PROVIDER - NSDCMRMEDTOKEN_GEN_ALL_CORE_FT
hydrALAZINE 20 mg/mL injectable solution: 5 milligram(s) intravenous every 6 hours, As Needed for SBP &gt;160 or DBP &gt;90  levETIRAcetam 100 mg/mL intravenous solution: 5 milliliter(s) intravenous every 12 hours for 7 days

## 2021-10-14 NOTE — OCCUPATIONAL THERAPY INITIAL EVALUATION ADULT - LIVES WITH, PROFILE
Patient lives with significant other in a 5th floor apartment. ~4 steps to enter apartment and elevator inside to bring patient to 5th floor./significant other

## 2021-10-14 NOTE — OCCUPATIONAL THERAPY INITIAL EVALUATION ADULT - PERTINENT HX OF CURRENT PROBLEM, REHAB EVAL
Patient is a 56 year old female with no significant PMH who presents with dizziness, weakness, and disorientation x 2 days. She is also experiencing a HA that began on 10/10/2021 that has decreased slightly since admission. Patient seen with some weakness on the left side of her body. Head CT demonstrates intraparenchymal hemorrhage and subarachnoid hemorrhage.

## 2021-10-14 NOTE — DISCHARGE NOTE PROVIDER - HOSPITAL COURSE
56 y.o. F with no significant PMH (although does not follow with doctors) who presents with dizziness, weakness, and disorientation x 2 days in the setting of L parietal intraparenchymal lobar hemorrhage.     .  intracerebral hemorrhage  - neuro cs  - neurosx cs  - keppra for 7 days  - MRI brain   - BP goal <160/90  - hydralazine PRN  - s/s - regular, thin   - keppra 500 BID x 7 days for seizure ppx  - repeat CT head - stable left parietal parenchymal hemorrhage measuring up to 3.7 cm and adjacent subarachnoid hemorrhage.   - transfer to [place] for further care.        56 y.o. F with no significant PMH (although does not follow with doctors) who presents with dizziness, weakness, and disorientation x 2 days in the setting of L parietal intraparenchymal lobar hemorrhage.     .  intracerebral hemorrhage  - neuro cs  - neurosx cs  - keppra for 7 days  - MRI brain   - BP goal <160/90  - hydralazine PRN  - s/s - regular, thin   - keppra 500 BID x 7 days for seizure ppx  - repeat CT head - stable left parietal parenchymal hemorrhage measuring up to 3.7 cm and adjacent subarachnoid hemorrhage.   - MRI brain -  Left parietal parasagittal acute hematoma with surrounding edema in the distribution of the left PCA territory.  - transfer to [place] for further care

## 2021-10-14 NOTE — CONSULT NOTE ADULT - SUBJECTIVE AND OBJECTIVE BOX
Date of Service :   10-14-21 @ 15:34  CHIEF COMPLAINT:Patient is a 56y old  Female who presents with a chief complaint of Weakness (13 Oct 2021 18:32)      HISTORY OF PRESENT ILLNESS:HPI:  56 y.o. F with no significant PMH (although does not follow with doctors) who presents with dizziness, weakness, and disorientation x 2 days. She is also experiencing a HA that began 4 days ago (10/10) and persists while she is in the ED, although with lower severity. She also endorses some weakness on the left side of her body. She denies any numbness, paresthesias, dysphagia, or N/V. No family history of malignancy or CVA. Given persistent symptoms, patient decided to present to the ED for evaluation. Patient continues to endorse dizziness.     In ED, code stroke was called. CT head demonstrates intraparenchymal hemorrhage.  (13 Oct 2021 18:32)      PAST MEDICAL & SURGICAL HISTORY:  No pertinent past medical history    No significant past surgical history            MEDICATIONS:  hydrALAZINE Injectable 5 milliGRAM(s) IV Push every 6 hours PRN        acetaminophen   Tablet .. 650 milliGRAM(s) Oral every 6 hours PRN  levETIRAcetam  IVPB 500 milliGRAM(s) IV Intermittent every 12 hours  melatonin 3 milliGRAM(s) Oral at bedtime PRN  ondansetron Injectable 4 milliGRAM(s) IV Push every 8 hours PRN    aluminum hydroxide/magnesium hydroxide/simethicone Suspension 30 milliLiter(s) Oral every 4 hours PRN          FAMILY HISTORY:  FH: hypertension (Father)        Non-contributory    SOCIAL HISTORY:    [ ] Tobacco  [ ] Drugs  [ ] Alcohol    Allergies    No Known Allergies    Intolerances    	    REVIEW OF SYSTEMS:  CONSTITUTIONAL: No fever  EYES: No eye pain, visual disturbances, or discharge  ENMT:  No difficulty hearing, tinnitus  NECK: No pain or stiffness  RESPIRATORY: No cough, wheezing,  CARDIOVASCULAR: No chest pain, palpitations, passing out, dizziness, or leg swelling  GASTROINTESTINAL:  No nausea, vomiting, diarrhea or constipation. No melena.  GENITOURINARY: No dysuria, hematuria  NEUROLOGICAL: No stroke like symptoms  SKIN: No burning or lesions   ENDOCRINE: No heat or cold intolerance  MUSCULOSKELETAL: No joint pain or swelling  PSYCHIATRIC: No  anxiety, mood swings  HEME/LYMPH: No bleeding gums  ALLERGY AND IMMUNOLOGIC: No hives or eczema	    All other ROS negative    PHYSICAL EXAM:  T(C): 36.6 (10-14-21 @ 08:55), Max: 36.9 (10-13-21 @ 22:46)  HR: 62 (10-14-21 @ 08:55) (60 - 66)  BP: 150/87 (10-14-21 @ 08:55) (136/67 - 150/87)  RR: 18 (10-14-21 @ 08:55) (16 - 21)  SpO2: 99% (10-14-21 @ 08:55) (97% - 100%)  Wt(kg): --  I&O's Summary      Appearance: Normal	  HEENT:   Normal oral mucosa, EOMI	  Cardiovascular:  S1 S2, No JVD,    Respiratory: Lungs clear to auscultation	  Psychiatry: Alert  Gastrointestinal:  Soft, Non-tender, + BS	  Skin: No rashes   Neurologic: Non-focal  Extremities:  No edema  Vascular: Peripheral pulses palpable    	    	  	  CARDIAC MARKERS:  Labs personally reviewed by me                                  11.9   5.55  )-----------( 223      ( 14 Oct 2021 07:24 )             37.6     10-14    141  |  108<H>  |  12  ----------------------------<  92  4.8   |  21<L>  |  0.83    Ca    8.6      14 Oct 2021 07:24  Phos  3.7     10-14  Mg     2.30     10-14    TPro  7.7  /  Alb  3.8  /  TBili  0.2  /  DBili  x   /  AST  22  /  ALT  13  /  AlkPhos  74  10-13          EKG: Personally reviewed by me -   Radiology: Personally reviewed by me -       Assessment /Plan:         Lillian Up [facility]   Oliverio Nelson DO [facility]  Cardiovascular Medicine  [address], [address]  Office [phone]  Cell [phone] Date of Service :   10-14-21 @ 15:34  CHIEF COMPLAINT:Patient is a 56y old  Female who presents with a chief complaint of Weakness (13 Oct 2021 18:32)      HISTORY OF PRESENT ILLNESS:HPI:  56 y.o. F with no significant PMH (although does not follow with doctors) who presents with dizziness, weakness, and disorientation x 2 days. She is also experiencing a HA that began 4 days ago (10/10) and persists while she is in the ED, although with lower severity. She also endorses some weakness on the left side of her body. She denies any numbness, paresthesias, dysphagia, or N/V. No family history of malignancy or CVA. Given persistent symptoms, patient decided to present to the ED for evaluation. Patient continues to endorse dizziness.     In ED, code stroke was called. CT head demonstrates intraparenchymal hemorrhage.  (13 Oct 2021 18:32)      PAST MEDICAL & SURGICAL HISTORY:  No pertinent past medical history    No significant past surgical history            MEDICATIONS:  hydrALAZINE Injectable 5 milliGRAM(s) IV Push every 6 hours PRN        acetaminophen   Tablet .. 650 milliGRAM(s) Oral every 6 hours PRN  levETIRAcetam  IVPB 500 milliGRAM(s) IV Intermittent every 12 hours  melatonin 3 milliGRAM(s) Oral at bedtime PRN  ondansetron Injectable 4 milliGRAM(s) IV Push every 8 hours PRN    aluminum hydroxide/magnesium hydroxide/simethicone Suspension 30 milliLiter(s) Oral every 4 hours PRN          FAMILY HISTORY:  FH: hypertension (Father)        Non-contributory    SOCIAL HISTORY:    [ ] Tobacco  [ ] Drugs  [ ] Alcohol    Allergies    No Known Allergies    Intolerances    	    REVIEW OF SYSTEMS:  CONSTITUTIONAL: No fever  EYES: No eye pain, visual disturbances, or discharge  ENMT:  No difficulty hearing, tinnitus  NECK: No pain or stiffness  RESPIRATORY: No cough, wheezing,  CARDIOVASCULAR: No chest pain, palpitations, passing out, dizziness, or leg swelling  GASTROINTESTINAL:  No nausea, vomiting, diarrhea or constipation. No melena.  GENITOURINARY: No dysuria, hematuria  NEUROLOGICAL: No stroke like symptoms  SKIN: No burning or lesions   ENDOCRINE: No heat or cold intolerance  MUSCULOSKELETAL: No joint pain or swelling  PSYCHIATRIC: No  anxiety, mood swings  HEME/LYMPH: No bleeding gums  ALLERGY AND IMMUNOLOGIC: No hives or eczema	    All other ROS negative    PHYSICAL EXAM:  T(C): 36.6 (10-14-21 @ 08:55), Max: 36.9 (10-13-21 @ 22:46)  HR: 62 (10-14-21 @ 08:55) (60 - 66)  BP: 150/87 (10-14-21 @ 08:55) (136/67 - 150/87)  RR: 18 (10-14-21 @ 08:55) (16 - 21)  SpO2: 99% (10-14-21 @ 08:55) (97% - 100%)  Wt(kg): --  I&O's Summary      Appearance: Normal	  HEENT:   Normal oral mucosa, EOMI	  Cardiovascular:  S1 S2, No JVD,    Respiratory: Lungs clear to auscultation	  Psychiatry: Alert  Gastrointestinal:  Soft, Non-tender, + BS	  Skin: No rashes   Neurologic: Non-focal  Extremities:  No edema  Vascular: Peripheral pulses palpable    	    	  	  CARDIAC MARKERS:  Labs personally reviewed by me                                  11.9   5.55  )-----------( 223      ( 14 Oct 2021 07:24 )             37.6     10-14    141  |  108<H>  |  12  ----------------------------<  92  4.8   |  21<L>  |  0.83    Ca    8.6      14 Oct 2021 07:24  Phos  3.7     10-14  Mg     2.30     10-14    TPro  7.7  /  Alb  3.8  /  TBili  0.2  /  DBili  x   /  AST  22  /  ALT  13  /  AlkPhos  74  10-13          EKG: Personally reviewed by me - SR 1rst degree AV block 64 BPM   Radiology: Personally reviewed by me -     < from: CT Head No Cont (10.14.21 @ 10:01) >    -Stable left parietal parenchymal hemorrhage measuring up to 3.7 cm and adjacent subarachnoid hemorrhage. No new or worsening intracranial hemorrhage.      < end of copied text >  < from: CT Head No Cont (10.13.21 @ 19:50) >  IMPRESSION:   unchanged 2.5 cm parenchymal hematoma in the LEFT parietal lobe with minimal subdural hemorrhage along the posterior falx.    --- End of Report ---    < end of copied text >  < from: CT Angio Head w/ IV Cont (10.13.21 @ 14:36) >    There is an intraparenchymal hemorrhage in the left parietal lobule with perihemorrhagic edema as described. There is no extension of the hemorrhage into the ventricles, midline shift or herniation.    There is no acute lobar infarction.    CTA brain: There is no large vessel occlusion or aneurysm involving major proximal intracranial arteries. There is no dominant arteriovenous malformation.    CTA NECK: There is no stenosis involving major neck arteries.    CT venogram: There is no high-grade stenosis or occlusion within the dural venous sinuses.    NASCET CAROTID STENOSIS CRITERIA (distal normal appearing ICA as denominator for measurement): 0%-none, 1-49%-mild, 50-70%-moderate, 70-89%-severe, 90-99%-critical.    Notification to clinician of alert:    Provider   Dr. Smiley was notified about the final results at 10/13/2021 1:56 PM and Dr. Adair was notified aboutthe final results at 1410 hours, 10/13/2021 via telephone by neuroradiologist BRIANNE Erazo. Readback confirmation was obtained.      < end of copied text >  < from: CT Brain Stroke Protocol (10.13.21 @ 13:57) >    There is an intraparenchymal hemorrhage in the left parietal lobule with perihemorrhagic edema as described. There is no extension of the hemorrhage into the ventricles, midline shift or herniation.    There is no acute lobar infarction.    CTA brain: There is no large vessel occlusion or aneurysm involving major proximal intracranial arteries. There is no dominant arteriovenous malformation.    CTA NECK: There is no stenosis involving major neck arteries.    CT venogram: There is no high-grade stenosis or occlusion within the dural venous sinuses.    NASCET CAROTID STENOSIS CRITERIA (distal normal appearing ICA as denominator for measurement): 0%-none, 1-49%-mild, 50-70%-moderate, 70-89%-severe, 90-99%-critical.    Notification to clinician of alert:    Provider   Dr. Smiley was notified about the final results at 10/13/2021 1:56 PM and Dr. Adair was notified aboutthe final results at 1410 hours, 10/13/2021 via telephone by neuroradiologist BRIANNE Erazo. Readback confirmation was obtained.        < end of copied text >    Assessment /Plan:   56 y.o. F with no significant PMH (although does not follow with doctors) who presents with dizziness, weakness, and disorientation x 2 days. She is also experiencing a HA that began 4 days ago. She currently denies any weaknesses, dizziness, palpitation or blurry vision. She is still experiencing HA 6/10. pt denies any cardiac history. Reports she gets her annual checkups and has been fine. Denies any smoking history, father has HTN. pt is obese and gets ESTEBAN at baseline, she is unable to walk 2-3 blocks 2/2 ESTEBAN. She reports occasional swelling of the legs and unable to lay flat when she sleeps 2/2 orthopnea.       Problem/Plan - 1:  ·  Problem: Intraparenchymal hemorrhage.   - serial CT head reveals intraparenchyma hemorrhage   -no evidence of cerebral venous thrombosis on CTV  -BP goal <160/90. Hydralazine 5 mg PRN q6h ordered  - monitor BP   -repeat CTH 4 hours and 24 hours after initial scan  -passed dysphagia screen, diet initiated  -Keppra 500 BID x 7 days for seizure ppx  -neurology following.    Problem/Plan - 2:  ·  Problem: cardiac disease screening   - pt is morbidly obese at high risk factor for cardiac disease   - rec Echo to eval heart structures and function 2/2 orthopnea, ESTEBAN and abnormal EKG   - check lipid with am labs   - monitor BP         Lillian WRIGHT-BC   Oliverio Nelson DO [facility]  Cardiovascular Medicine  [address] [place] [address], [address]  Office [phone]  Cell [phone]

## 2021-10-14 NOTE — H&P ADULT - TIME BILLING
56-year-old right-handed lady first evaluated at [facility] on 10/15/2021 with headache.  History and exam as above.  ROS otherwise negative.  CT head (10/13/2021) to my eye showed a small hemorrhage in the left parasagittal, either parietal or perhaps perirolandic region, about 2.5 cm in greatest diameter.  CTA neck and head (10/13/2021) to my eye was unremarkable.  Impression.  Beginning on or about 10/12/2021 she developed severe headache.  Denied cognitive changes, visual disturbance or vertigo.  CT head showed a small left parasagittal, either parietal or perhaps perirolandic lobar hemorrhage.  The etiology of the hemorrhage is uncertain, but a small vascular malformation, including a micro-AVM should be screened for.  Doubt hemorrhage into a metastasis.  Plan.  Conventional cerebral angiogram; CT chest/abdomen/pelvis with contrast; keep blood pressure less than 160/90; no need for PT/OT as patient is at her functional baseline.

## 2021-10-14 NOTE — OCCUPATIONAL THERAPY INITIAL EVALUATION ADULT - LIGHT TOUCH SENSATION, RUE, REHAB EVAL
Bedside shift change report given to Dario Stephens (oncoming nurse) by Quincy Crowder (offgoing nurse). Report included the following information SBAR, Kardex, MAR and Cardiac Rhythm NSR. within normal limits

## 2021-10-14 NOTE — CONSULT NOTE ADULT - SUBJECTIVE AND OBJECTIVE BOX
DATE OF SERVICE: 10-14-21 @ 16:25    CHIEF COMPLAINT:Patient is a 56y old  Female who presents with a chief complaint of Weakness (14 Oct 2021 15:51)      HISTORY OF PRESENT ILLNESS:HPI:  56 y.o. F with no significant PMH (although does not follow with doctors) who presents with dizziness, weakness, and disorientation x 2 days. She is also experiencing a HA that began 4 days ago (10/10) and persists while she is in the ED, although with lower severity. She also endorses some weakness on the left side of her body. She denies any numbness, paresthesias, dysphagia, or N/V. No family history of malignancy or CVA. Given persistent symptoms, patient decided to present to the ED for evaluation. Patient continues to endorse dizziness.     In ED, code stroke was called. CT head demonstrates intraparenchymal hemorrhage.  (13 Oct 2021 18:32)      PAST MEDICAL & SURGICAL HISTORY:  No pertinent past medical history    No significant past surgical history            MEDICATIONS:  hydrALAZINE Injectable 5 milliGRAM(s) IV Push every 6 hours PRN        acetaminophen   Tablet .. 650 milliGRAM(s) Oral every 6 hours PRN  levETIRAcetam  IVPB 500 milliGRAM(s) IV Intermittent every 12 hours  melatonin 3 milliGRAM(s) Oral at bedtime PRN  ondansetron Injectable 4 milliGRAM(s) IV Push every 8 hours PRN    aluminum hydroxide/magnesium hydroxide/simethicone Suspension 30 milliLiter(s) Oral every 4 hours PRN          FAMILY HISTORY:  FH: hypertension (Father)        Non-contributory    SOCIAL HISTORY:    [ ] Tobacco  [ ] Drugs  [ ] Alcohol    Allergies    No Known Allergies    Intolerances    	    REVIEW OF SYSTEMS:  CONSTITUTIONAL: No fever  EYES: No eye pain, visual disturbances, or discharge  ENMT:  No difficulty hearing, tinnitus  NECK: No pain or stiffness  RESPIRATORY: No cough, wheezing,  CARDIOVASCULAR: No chest pain, palpitations, passing out, dizziness, or leg swelling  GASTROINTESTINAL:  No nausea, vomiting, diarrhea or constipation. No melena.  GENITOURINARY: No dysuria, hematuria  NEUROLOGICAL: No stroke like symptoms  SKIN: No burning or lesions   ENDOCRINE: No heat or cold intolerance  MUSCULOSKELETAL: No joint pain or swelling  PSYCHIATRIC: No  anxiety, mood swings  HEME/LYMPH: No bleeding gums  ALLERGY AND IMMUNOLOGIC: No hives or eczema	    All other ROS negative    PHYSICAL EXAM:  T(C): 36.9 (10-14-21 @ 15:35), Max: 36.9 (10-13-21 @ 22:46)  HR: 61 (10-14-21 @ 15:35) (60 - 66)  BP: 144/86 (10-14-21 @ 15:35) (136/67 - 150/87)  RR: 17 (10-14-21 @ 15:35) (16 - 21)  SpO2: 100% (10-14-21 @ 15:35) (97% - 100%)  Wt(kg): --  I&O's Summary      Appearance: Normal	  HEENT:   Normal oral mucosa, EOMI	  Cardiovascular:  S1 S2, No JVD,    Respiratory: Lungs clear to auscultation	  Psychiatry: Alert  Gastrointestinal:  Soft, Non-tender, + BS	  Skin: No rashes   Neurologic: Non-focal  Extremities:  No edema  Vascular: Peripheral pulses palpable    	    	  	  CARDIAC MARKERS:  Labs personally reviewed by me                                  11.9   5.55  )-----------( 223      ( 14 Oct 2021 07:24 )             37.6     10-14    141  |  108<H>  |  12  ----------------------------<  92  4.8   |  21<L>  |  0.83    Ca    8.6      14 Oct 2021 07:24  Phos  3.7     10-14  Mg     2.30     10-14    TPro  7.7  /  Alb  3.8  /  TBili  0.2  /  DBili  x   /  AST  22  /  ALT  13  /  AlkPhos  74  10-13          EKG: Personally reviewed by me -   Radiology: Personally reviewed by me -       Assessment /Plan:   Full recs to follow         Differential diagnosis and plan of care discussed with patient after the evaluation. Counseling on diet, nutritional counseling, weight management, exercise and medication compliance was done.   Advanced care planning/advanced directives discussed with patient/family. DNR status including forceful chest compressions to attempt to restart the heart, ventilator support/artificial breathing, electric shock, artificial nutrition, health care proxy, Molst form all discussed with pt. Pt wishes to consider. More than fifteen minutes spent on discussing advanced directives. OMT on six regions for acute somatic dysfunctions done at the bedside. One hundred ten minutes spent on encounter, of which more than fifty percent of the encounter was spent counseling and/or coordinating care by the attending physician        Oliverio Nelson DO [facility]  Cardiovascular Medicine  [address] [place] [address], [address]  Office [phone]  Cell [phone]

## 2021-10-14 NOTE — PATIENT PROFILE ADULT - TOBACCO USE
Needs five more weeks of paclitaxel scheduled, will need one day of lab and magnesium replacement added each week (Tuesdays please)    In seven weeks will need to see Dr. Yenug with a CT CAP with contrast beforehand    
Never smoker

## 2021-10-14 NOTE — PATIENT PROFILE ADULT - FALL HARM RISK TYPE OF ASSESSMENT
Patient ID: Opal Goff is a 37year old female. HPI  Patient presents with:  Wellness Visit    Pt is  and does not smoke. She works in marketing and sales. Present today with her , Dominic Mendoza. She has 2 children.  She regularly sees  vaccination.        Influenza Vaccine(1) due on 09/01/2019  Annual Depression Screen due on 02/05/2021  Annual Physical due on 02/05/2021  Pap Smear,3 Years due on 11/26/2021    =======================================================    Lab Results   Compon BILUR NEG 10/27/2012    KETUR NEG 10/27/2012    BLOODURINE TR (A) 10/27/2012    PHURINE 7.0 10/27/2012    PROUR NEG 10/27/2012    UROBILINOGEN <2.0 10/27/2012    NITRITE NEG 10/27/2012    LEUUR LG (A) 10/27/2012    ASCACIDURINE NEG 10/27/2012    WBCUR 191 environmental allergies. Neurological: Negative for speech difficulty. Psychiatric/Behavioral: Negative for dysphoric mood. The patient is not nervous/anxious.           Past Medical History:   Diagnosis Date   • History of pregnancy      x2 FT Occupational Exposure: Not Asked        Hobby Hazards: Not Asked        Sleep Concern: Not Asked        Stress Concern: Not Asked        Weight Concern: Not Asked        Special Diet: Not Asked        Back Care: Not Asked        Exercise: Not Asked 02/26/2020, not currently breastfeeding. ASSESSMENT/PLAN:     Pt will perform fasting labs today. Diagnoses and all orders for this visit:    Adult general medical exam  -     CBC WITH DIFFERENTIAL WITH PLATELET;  Future  -     COMP METABOLIC PA admission

## 2021-10-14 NOTE — PHYSICAL THERAPY INITIAL EVALUATION ADULT - PLANNED THERAPY INTERVENTIONS, PT EVAL
stair assessment; Patient left positioned for safety on stretcher in NAD, call bell in reach, +bilateral side rails; all lines intact./gait training

## 2021-10-14 NOTE — H&P ADULT - NSHPPHYSICALEXAM_GEN_ALL_CORE
MS: Eyes open, responds to verbal stimuli, but closes them due to photophobia. Alert, oriented to self, place, situation, and time. Follows all commands  Language: Speech is clear, fluent with good repetition & comprehension   CNs: PERRL (R = 3mm, L = 3mm). VFF. EOMI no nystagmus. V1-3 intact to LT. mild L facial asymmetry but able to overcome. Hearing grossly normal (rubbing fingers) b/l. Head turning & shoulder shrug intact b/l. Tongue midline, normal movements, no atrophy  Motor: normal tone. No tremors. 5/5 throughout biceps, BR, , hip flexion, knee extension, dorsi/plantar flexion b/l  Sensation: Intact to light touch throughout. No right left confusion  Reflex: 2 biceps and BR b/l. 1 patellar and achilles b/l. No clonus. Plantar response down b/l  Extinction: negative on DSS  Coordination: no dysmetria upon FTN and HTS

## 2021-10-14 NOTE — DISCHARGE NOTE PROVIDER - NSDCCPCAREPLAN_GEN_ALL_CORE_FT
PRINCIPAL DISCHARGE DIAGNOSIS  Diagnosis: Intracranial bleed  Assessment and Plan of Treatment: You are being transferred to [facility] for further care.

## 2021-10-14 NOTE — H&P ADULT - NSHPLABSRESULTS_GEN_ALL_CORE
LABS:                         11.9   5.55  )-----------( 223      ( 14 Oct 2021 07:24 )             37.6     10-14    141  |  108<H>  |  12  ----------------------------<  92  4.8   |  21<L>  |  0.83    Ca    8.6      14 Oct 2021 07:24  Phos  3.7     10-14  Mg     2.30     10-14    TPro  7.7  /  Alb  3.8  /  TBili  0.2  /  DBili  x   /  AST  22  /  ALT  13  /  AlkPhos  74  10-13    PT/INR - ( 13 Oct 2021 13:50 )   PT: 11.9 sec;   INR: 1.04 ratio         PTT - ( 13 Oct 2021 13:50 )  PTT:36.7 sec  Urinalysis Basic - ( 13 Oct 2021 15:24 )    Color: Light Yellow / Appearance: Clear / SG: >1.050 / pH: x  Gluc: x / Ketone: Negative  / Bili: Negative / Urobili: <2 mg/dL   Blood: x / Protein: Trace / Nitrite: Negative   Leuk Esterase: Negative / RBC: 1 /HPF / WBC 14 /HPF   Sq Epi: x / Non Sq Epi: 6 /HPF / Bacteria: Few    RADIOLOGY, EKG & ADDITIONAL TESTS: Reviewed.    < from: CT Brain Stroke Protocol (10.13.21 @ 13:57) >    IMPRESSION:    CT brain: There is an intraparenchymal hemorrhage in the left parietal lobule with perihemorrhagic edema as described. There is no extension of the hemorrhage into the ventricles, midline shift or herniation. There is no acute lobar infarction.    CTA brain: There is no large vessel occlusion or aneurysm involving major proximal intracranial arteries. There is no dominant arteriovenous malformation.    CTA NECK: There is no stenosis involving major neck arteries.    CT venogram: There is no high-grade stenosis or occlusion within the dural venous sinuses.    --- End of Report ---        < from: MR Head w/wo IV Cont (10.14.21 @ 16:00) >    IMPRESSION:  Left parietal parasagittal acute hematoma with surrounding edema in the distribution of the left PCA territory. No abnormal enhancement in association. No vascular hypertrophy    --- End of Report ---

## 2021-10-14 NOTE — H&P ADULT - ASSESSMENT
Impression: Subacute vertigo, gait instability, and b/l frontal headache of unclear etiology. Localization likely L PCA territory.  Ddx include L parietal parasagittal acute hematoma with surrounding edema from AVM vs HTN vs trauma vs r/o neoplastic     Recommendations:  [] Would hold DVT ppx for now   [] Would hold statin for now  [] Symptomatic tx for headache with IV tylenol  [] Plan for angiogram with Dr. Reagan  [] TTE  [] +/- ILR depending on TTE    Labs  [] CBC, BMP, and lipid profile     Other  [] Telemonitoring;  Neurochecks and Vital signs per unit protocol  [] Permissive HTN up to SBP < 160 mmHg  [] BGM goals 140-180  [] PT/OT evaluation  [] NPO except medications at this time  [] Stroke education provided   56 y.o. R-H F with no PMH presented to [facility] as a transfer from [facility] for angiogram. Initially presented as a code stroke at [facility] on 10/13 with unknown LKN. Pt started to have disorientation, gait instability, vertigo, ? R temporal vision loss for 3 days. Current NIHSS 1 (mild L facial droop) and mRS 0. Imaging studies at [facility] showed L parietal parasagittal acute hematoma with surrounding edema. Neuro exam at this time shows mild L facial droop, otherwise unremarkable. Labs from [facility] reveal + UA with few bacteria and WBC 14.     Impression: Subacute vertigo, gait instability, and b/l frontal headache likely due to L parietal parasagittal acute hematoma. Localization is L PCA territory. Etiology for hematoma include AVM vs HTN vs trauma vs r/o neoplastic     Recommendations:  [] If sudden mental status changes, would repeat CTH   [] Would hold DVT ppx for now   [] Would hold statin for now  [] Symptomatic tx for headache with IV tylenol  [] Plan for angiogram with Dr. Reagan  [] TTE  [] +/- ILR depending on TTE    Labs  [] CBC, BMP, and lipid profile     Other  [] Telemonitoring;  Neurochecks and Vital signs per unit protocol  [] Permissive HTN up to SBP < 160 mmHg  [] BGM goals 140-180  [] PT/OT evaluation  [] NPO except medications at this time  [] Stroke education provided   56 y.o. R-H F with no PMH presented to [facility] as a transfer from [facility] for angiogram. Initially presented as a code stroke at [facility] on 10/13 with unknown LKN. Pt started to have disorientation, gait instability, vertigo, ? R temporal vision loss for 3 days. Current NIHSS 1 (mild L facial droop) and mRS 0. Imaging studies at [facility] showed L parietal parasagittal acute hematoma with surrounding edema. Neuro exam at this time shows mild L facial droop, otherwise unremarkable. MR brain w/wo contrast showed L parietal parasagittal acute hematoma with surrounding edema in L PCA territory. Labs from [facility] reveal + UA with few bacteria and WBC 14.     Impression: Subacute vertigo, gait instability, and b/l frontal headache likely due to L parietal parasagittal acute hematoma. Localization is L PCA territory. Etiology for hematoma include AVM vs HTN vs trauma vs r/o neoplastic     Recommendations:  [] If sudden mental status changes, would repeat CTH   [] Would hold DVT ppx for now   [] Would hold statin for now  [] Symptomatic tx for headache with IV tylenol  [] Plan for angiogram with Dr. Reagan  [] TTE  [] +/- ILR depending on TTE    Labs  [] CBC, BMP, and lipid profile     Other  [] Telemonitoring;  Neurochecks and Vital signs per unit protocol  [] Permissive HTN up to SBP < 160 mmHg  [] BGM goals 140-180  [] PT/OT evaluation  [] NPO except medications at this time  [] Stroke education provided   56 y.o. R-H F with no PMH presented to [facility] as a transfer from [facility] for angiogram. Initially presented as a code stroke at [facility] on 10/13 with unknown LKN. Pt started to have disorientation, gait instability, vertigo, ? R temporal vision loss for 3 days. Current NIHSS 1 (mild L facial droop) and mRS 0. Imaging studies at [facility] showed L parietal parasagittal acute hematoma with surrounding edema. Neuro exam at this time shows mild L facial droop, otherwise unremarkable. MR brain w/wo contrast showed L parietal parasagittal acute hematoma with surrounding edema in L PCA territory. Labs from [facility] reveal + UA with few bacteria and WBC 14.     Impression: Subacute vertigo, gait instability, and b/l frontal headache likely due to L parietal parasagittal acute hematoma. Localization is L PCA territory. Etiology for hematoma include AVM vs HTN vs trauma vs r/o neoplastic     Recommendations:  [] If sudden mental status changes, would repeat CTH   [] Would hold DVT ppx for now   [] Would hold statin for now  [] Symptomatic tx for headache with IV tylenol  [] Plan for angiogram with Dr. Reagan  [] TTE  [] +/- ILR depending on TTE    Labs  [] CBC, BMP, and lipid profile     Other  [] Telemonitoring;  Neurochecks and Vital signs per unit protocol  [] Permissive HTN up to SBP < 160 mmHg  [] BGM goals 140-180  [] PT/OT evaluation  [] NPO except medications at this time  [] Stroke education provided    Case to be discussed with stroke attending Dr. Libman

## 2021-10-14 NOTE — SWALLOW BEDSIDE ASSESSMENT ADULT - H & P REVIEW
yes Partially impaired: cannot see medication labels or newsprint, but can see obstacles in path, and the surrounding layout; can count fingers at arm's length

## 2021-10-14 NOTE — OCCUPATIONAL THERAPY INITIAL EVALUATION ADULT - DIAGNOSIS, OT EVAL
s/p dizziness, s/p left-sided weakness, s/p disorientation, s/p HA, s/p intraparenchymal hemorrhage and subarachnoid hemorrhage; decreased functional mobility; decreased ADL performance

## 2021-10-15 LAB
ANION GAP SERPL CALC-SCNC: 12 MMOL/L — SIGNIFICANT CHANGE UP (ref 5–17)
BUN SERPL-MCNC: 9 MG/DL — SIGNIFICANT CHANGE UP (ref 7–23)
CALCIUM SERPL-MCNC: 9 MG/DL — SIGNIFICANT CHANGE UP (ref 8.4–10.5)
CHLORIDE SERPL-SCNC: 106 MMOL/L — SIGNIFICANT CHANGE UP (ref 96–108)
CHOLEST SERPL-MCNC: 158 MG/DL — SIGNIFICANT CHANGE UP
CO2 SERPL-SCNC: 21 MMOL/L — LOW (ref 22–31)
CREAT SERPL-MCNC: 0.93 MG/DL — SIGNIFICANT CHANGE UP (ref 0.5–1.3)
GLUCOSE SERPL-MCNC: 100 MG/DL — HIGH (ref 70–99)
HCG SERPL-ACNC: <2 MIU/ML — SIGNIFICANT CHANGE UP
HCT VFR BLD CALC: 41.7 % — SIGNIFICANT CHANGE UP (ref 34.5–45)
HDLC SERPL-MCNC: 51 MG/DL — SIGNIFICANT CHANGE UP
HGB BLD-MCNC: 13 G/DL — SIGNIFICANT CHANGE UP (ref 11.5–15.5)
LIPID PNL WITH DIRECT LDL SERPL: 85 MG/DL — SIGNIFICANT CHANGE UP
MCHC RBC-ENTMCNC: 25.6 PG — LOW (ref 27–34)
MCHC RBC-ENTMCNC: 31.2 GM/DL — LOW (ref 32–36)
MCV RBC AUTO: 82.2 FL — SIGNIFICANT CHANGE UP (ref 80–100)
NON HDL CHOLESTEROL: 107 MG/DL — SIGNIFICANT CHANGE UP
NRBC # BLD: 0 /100 WBCS — SIGNIFICANT CHANGE UP (ref 0–0)
PLATELET # BLD AUTO: 273 K/UL — SIGNIFICANT CHANGE UP (ref 150–400)
POTASSIUM SERPL-MCNC: 3.8 MMOL/L — SIGNIFICANT CHANGE UP (ref 3.5–5.3)
POTASSIUM SERPL-SCNC: 3.8 MMOL/L — SIGNIFICANT CHANGE UP (ref 3.5–5.3)
RBC # BLD: 5.07 M/UL — SIGNIFICANT CHANGE UP (ref 3.8–5.2)
RBC # FLD: 15.1 % — HIGH (ref 10.3–14.5)
SODIUM SERPL-SCNC: 139 MMOL/L — SIGNIFICANT CHANGE UP (ref 135–145)
TRIGL SERPL-MCNC: 110 MG/DL — SIGNIFICANT CHANGE UP
WBC # BLD: 6.89 K/UL — SIGNIFICANT CHANGE UP (ref 3.8–10.5)
WBC # FLD AUTO: 6.89 K/UL — SIGNIFICANT CHANGE UP (ref 3.8–10.5)

## 2021-10-15 PROCEDURE — 93306 TTE W/DOPPLER COMPLETE: CPT | Mod: 26

## 2021-10-15 PROCEDURE — 99255 IP/OBS CONSLTJ NEW/EST HI 80: CPT

## 2021-10-15 RX ORDER — ACETAMINOPHEN 500 MG
1000 TABLET ORAL ONCE
Refills: 0 | Status: COMPLETED | OUTPATIENT
Start: 2021-10-15 | End: 2021-10-15

## 2021-10-15 RX ORDER — ACETAMINOPHEN 500 MG
650 TABLET ORAL EVERY 6 HOURS
Refills: 0 | Status: DISCONTINUED | OUTPATIENT
Start: 2021-10-15 | End: 2021-10-16

## 2021-10-15 RX ADMIN — Medication 400 MILLIGRAM(S): at 20:00

## 2021-10-15 RX ADMIN — Medication 400 MILLIGRAM(S): at 07:30

## 2021-10-15 RX ADMIN — Medication 1000 MILLIGRAM(S): at 08:53

## 2021-10-15 RX ADMIN — Medication 1000 MILLIGRAM(S): at 20:30

## 2021-10-15 RX ADMIN — LEVETIRACETAM 500 MILLIGRAM(S): 250 TABLET, FILM COATED ORAL at 17:36

## 2021-10-15 RX ADMIN — LEVETIRACETAM 500 MILLIGRAM(S): 250 TABLET, FILM COATED ORAL at 05:55

## 2021-10-15 NOTE — PHYSICAL THERAPY INITIAL EVALUATION ADULT - ACTIVE RANGE OF MOTION EXAMINATION, REHAB EVAL
stanley. upper extremity Active ROM was WNL (within normal limits)/bilateral lower extremity Active ROM was WNL (within normal limits)

## 2021-10-15 NOTE — OCCUPATIONAL THERAPY INITIAL EVALUATION ADULT - PERTINENT HX OF CURRENT PROBLEM, REHAB EVAL
56 y.o. R-H F with no PMH presented to [facility] as a transfer from [facility] for angiogram. Initially presented as a code stroke at [facility] on 10/13 with unknown LKN. Unclear whether had full vision loss but complained of possible R peripheral vision problems. CT brain 10/13/21 revealed an intraparenchymal hemorrhage in the left parietal lobule with perihemorrhagic edema with no extension of the hemorrhage into the ventricles, midline shift or herniation or acute lobar infarction.

## 2021-10-15 NOTE — CONSULT NOTE ADULT - SUBJECTIVE AND OBJECTIVE BOX
Date of Service :   10-15-21 @ 14:18  CHIEF COMPLAINT:Patient is a 56y old  Female who presents with a chief complaint of     HISTORY OF PRESENT ILLNESS:HPI:  56 y.o. R-H F with no PMH presented to [facility] as a transfer from [facility] for angiogram. Initially presented as a code stroke at [facility] on 10/13 with unknown LKN. Pt started to have disorientation for 3 days. Also endorsed headache that rated as severe as 10/10 at that time but improved over the 3 days. Unclear whether had full vision loss but complained of possible R peripheral vision problems. Also was experiencing room-spinning sensation that prevented her from walking at home. This vertigo was worsened by any eye movements. Currently has mild frontal headache that goes across the forehead with mild pressure behind the eyes. Headache is not pulsating or throbbing. Light worsens the headache but position whether laying or standing does not affect the intensity of the headache. Denies any lightheadedness or room-spinning sensation, floaters, black dots, curtains on the vision, n/v, abd pain, chest pain, pain/numbness/tingling in all extremities. Lives at home, does not need help for ADLs, walks independently at baseline, never smoker, no EtOH or illicit drug use.     At [facility], CTH demonstrated L pareital IPH measuring up to 3.7 cm and adjacent subarachnoid hemorrhage. CTA demonstrated no LVO. CTP was not performed. MR brain showed consistent L parietal parasagittal acute hematoma with surrounding edema in L PCA territory. Dr. De Paz and Dr. Gan saw the pt and recommended transfer for agiongram with Dr. Reagan.  (14 Oct 2021 22:14)      PAST MEDICAL & SURGICAL HISTORY:  No pertinent past medical history    No significant past surgical history            MEDICATIONS:        levETIRAcetam 500 milliGRAM(s) Oral two times a day        influenza   Vaccine 0.5 milliLiter(s) IntraMuscular once      FAMILY HISTORY:  FH: hypertension (Father)        Non-contributory    SOCIAL HISTORY:    [ ] Tobacco  [ ] Drugs  [ ] Alcohol    Allergies    No Known Allergies    Intolerances    	    REVIEW OF SYSTEMS:  CONSTITUTIONAL: No fever  EYES: No eye pain, visual disturbances, or discharge  ENMT:  No difficulty hearing, tinnitus  NECK: No pain or stiffness  RESPIRATORY: No cough, wheezing,  CARDIOVASCULAR: No chest pain, palpitations, passing out, dizziness, or leg swelling  GASTROINTESTINAL:  No nausea, vomiting, diarrhea or constipation. No melena.  GENITOURINARY: No dysuria, hematuria  NEUROLOGICAL: No stroke like symptoms  SKIN: No burning or lesions   ENDOCRINE: No heat or cold intolerance  MUSCULOSKELETAL: No joint pain or swelling  PSYCHIATRIC: No  anxiety, mood swings  HEME/LYMPH: No bleeding gums  ALLERGY AND IMMUNOLOGIC: No hives or eczema	    All other ROS negative    PHYSICAL EXAM:  T(C): 36.8 (10-15-21 @ 04:00), Max: 37 (10-14-21 @ 19:27)  HR: 63 (10-15-21 @ 12:16) (50 - 76)  BP: 118/75 (10-15-21 @ 12:16) (103/81 - 152/84)  RR: 24 (10-15-21 @ 10:49) (13 - 24)  SpO2: 98% (10-15-21 @ 12:16) (96% - 100%)  Wt(kg): --  I&O's Summary    14 Oct 2021 07:01  -  15 Oct 2021 07:00  --------------------------------------------------------  IN: 200 mL / OUT: 0 mL / NET: 200 mL        Appearance: Normal	  HEENT:   Normal oral mucosa, EOMI	  Cardiovascular:  S1 S2, No JVD,    Respiratory: Lungs clear to auscultation	  Psychiatry: Alert  Gastrointestinal:  Soft, Non-tender, + BS	  Skin: No rashes   Neurologic: Non-focal  Extremities:  No edema  Vascular: Peripheral pulses palpable    	    	  	  CARDIAC MARKERS:  Labs personally reviewed by me                                  13.0   6.89  )-----------( 273      ( 15 Oct 2021 06:12 )             41.7     10-15    139  |  106  |  9   ----------------------------<  100<H>  3.8   |  21<L>  |  0.93    Ca    9.0      15 Oct 2021 06:12  Phos  3.7     10-14  Mg     2.30     10-14            EKG: Personally reviewed by me -   Radiology: Personally reviewed by me -       Assessment /Plan:     56 y.o. F with no significant PMH (although does not follow with doctors) who presents with dizziness, weakness, and disorientation x 2 days. She is also experiencing a HA that began 4 days ago. She currently denies any weaknesses, dizziness, palpitation or blurry vision. She is still experiencing HA 6/10. pt denies any cardiac history. Reports she gets her annual checkups and has been fine. Denies any smoking history, father has HTN. pt is obese and gets ESTEBAN at baseline, she is unable to walk 2-3 blocks 2/2 ESTEBAN. She reports occasional swelling of the legs and unable to lay flat when she sleeps 2/2 orthopnea.       Problem/Plan - 1:  ·  Problem: Intraparenchymal hemorrhage.   - serial CT head reveals intraparenchyma hemorrhage   -no evidence of cerebral venous thrombosis on CTV  -BP goal <160/90. Hydralazine 5 mg PRN q6h ordered  - monitor BP   -repeat CTH 4 hours and 24 hours after initial scan  -passed dysphagia screen, diet initiated  -Keppra 500 BID x 7 days for seizure ppx  -neurology following.    Problem/Plan - 2:  ·  Problem: cardiac disease screening   - pt is morbidly obese at high risk factor for cardiac disease   - rec Echo to eval heart structures and function 2/2 orthopnea, ESTEBAN and abnormal EKG   - check lipid with am labs   - monitor BP     Lillian Up [facility]   Oliverio Nelson DO [facility]  Cardiovascular Medicine  [address] [place] [address], [address]  Office [phone]  Cell [phone] Date of Service :   10-15-21 @ 14:18  CHIEF COMPLAINT:Patient is a 56y old  Female who presents with a chief complaint of     HISTORY OF PRESENT ILLNESS:HPI:  56 y.o. R-H F with no PMH presented to [facility] as a transfer from [facility] for angiogram. Initially presented as a code stroke at [facility] on 10/13 with unknown LKN. Pt started to have disorientation for 3 days. Also endorsed headache that rated as severe as 10/10 at that time but improved over the 3 days. Unclear whether had full vision loss but complained of possible R peripheral vision problems. Also was experiencing room-spinning sensation that prevented her from walking at home. This vertigo was worsened by any eye movements. Currently has mild frontal headache that goes across the forehead with mild pressure behind the eyes. Headache is not pulsating or throbbing. Light worsens the headache but position whether laying or standing does not affect the intensity of the headache. Denies any lightheadedness or room-spinning sensation, floaters, black dots, curtains on the vision, n/v, abd pain, chest pain, pain/numbness/tingling in all extremities. Lives at home, does not need help for ADLs, walks independently at baseline, never smoker, no EtOH or illicit drug use.     At [facility] CTH demonstrated L pareital IPH measuring up to 3.7 cm and adjacent subarachnoid hemorrhage. CTA demonstrated no LVO. CTP was not performed. MR brain showed consistent L parietal parasagittal acute hematoma with surrounding edema in L PCA territory. Dr. De Paz and Dr. Gan saw the pt and recommended transfer for agiongram with Dr. Reagan.  (14 Oct 2021 22:14)      PAST MEDICAL & SURGICAL HISTORY:  No pertinent past medical history    No significant past surgical history            MEDICATIONS:        levETIRAcetam 500 milliGRAM(s) Oral two times a day        influenza   Vaccine 0.5 milliLiter(s) IntraMuscular once      FAMILY HISTORY:  FH: hypertension (Father)        Non-contributory    SOCIAL HISTORY:    [ ] Tobacco  [ ] Drugs  [ ] Alcohol    Allergies    No Known Allergies    Intolerances    	    REVIEW OF SYSTEMS:  CONSTITUTIONAL: No fever  EYES: No eye pain, visual disturbances, or discharge  ENMT:  No difficulty hearing, tinnitus  NECK: No pain or stiffness  RESPIRATORY: No cough, wheezing,  CARDIOVASCULAR: No chest pain, palpitations, passing out, dizziness, or leg swelling  GASTROINTESTINAL:  No nausea, vomiting, diarrhea or constipation. No melena.  GENITOURINARY: No dysuria, hematuria  NEUROLOGICAL: No stroke like symptoms  SKIN: No burning or lesions   ENDOCRINE: No heat or cold intolerance  MUSCULOSKELETAL: No joint pain or swelling  PSYCHIATRIC: No  anxiety, mood swings  HEME/LYMPH: No bleeding gums  ALLERGY AND IMMUNOLOGIC: No hives or eczema	    All other ROS negative    PHYSICAL EXAM:  T(C): 36.8 (10-15-21 @ 04:00), Max: 37 (10-14-21 @ 19:27)  HR: 63 (10-15-21 @ 12:16) (50 - 76)  BP: 118/75 (10-15-21 @ 12:16) (103/81 - 152/84)  RR: 24 (10-15-21 @ 10:49) (13 - 24)  SpO2: 98% (10-15-21 @ 12:16) (96% - 100%)  Wt(kg): --  I&O's Summary    14 Oct 2021 07:01  -  15 Oct 2021 07:00  --------------------------------------------------------  IN: 200 mL / OUT: 0 mL / NET: 200 mL        Appearance: Normal	  HEENT:   Normal oral mucosa, EOMI	  Cardiovascular:  S1 S2, No JVD,    Respiratory: Lungs clear to auscultation	  Psychiatry: Alert  Gastrointestinal:  Soft, Non-tender, + BS	  Skin: No rashes   Neurologic: Non-focal  Extremities:  No edema  Vascular: Peripheral pulses palpable    	    	  	  CARDIAC MARKERS:  Labs personally reviewed by me                                  13.0   6.89  )-----------( 273      ( 15 Oct 2021 06:12 )             41.7     10-15    139  |  106  |  9   ----------------------------<  100<H>  3.8   |  21<L>  |  0.93    Ca    9.0      15 Oct 2021 06:12  Phos  3.7     10-14  Mg     2.30     10-14            EKG: Personally reviewed by me -   Radiology: Personally reviewed by me -       Assessment /Plan:     56 y.o. F with no significant PMH (although does not follow with doctors) who presents as a transfer from [facility] for angiogram with dizziness, weakness, and disorientation x 2 days. She is also experiencing a HA that began 5 days ago. She currently denies any weaknesses, dizziness, palpitation or blurry vision. She is still experiencing HA 2/10. pt denies any cardiac history. Reports she gets her annual checkups and has been fine. Denies any smoking history, father has HTN. pt is obese and gets ESTEBAN at baseline, she is unable to walk 2-3 blocks 2/2 ESTEBAN. She reports occasional swelling of the legs and unable to lay flat when she sleeps 2/2 orthopnea.       Problem/Plan - 1:  ·  Problem: Intraparenchymal hemorrhage.   - serial CT head reveals intraparenchyma hemorrhage   -no evidence of cerebral venous thrombosis on CTV  -BP goal <160/90. Hydralazine 5 mg PRN q6h ordered  - monitor BP   -passed dysphagia screen, diet initiated  -Keppra 500 BID for seizure ppx  -neurology following.    Problem/Plan - 2:  ·  Problem: cardiac disease screening   - pt is morbidly obese at high risk factor for cardiac disease   - Echo ordered  to eval heart structures and function 2/2 orthopnea, ESTEBAN   - check lipid with am labs   - monitor BP           Lillian WRIGHT-BC   Oliverio Nelson DO [facility]  Cardiovascular Medicine  [address] [place] [address], [address]  Office [phone]  Cell [phone] Date of Service :   10-15-21 @ 14:18  CHIEF COMPLAINT:Patient is a 56y old  Female who presents with a chief complaint of     HISTORY OF PRESENT ILLNESS:HPI:  56 y.o. R-H F with no PMH presented to [facility] as a transfer from [facility] for angiogram. Initially presented as a code stroke at [facility] on 10/13 with unknown LKN. Pt started to have disorientation for 3 days. Also endorsed headache that rated as severe as 10/10 at that time but improved over the 3 days. Unclear whether had full vision loss but complained of possible R peripheral vision problems. Also was experiencing room-spinning sensation that prevented her from walking at home. This vertigo was worsened by any eye movements. Currently has mild frontal headache that goes across the forehead with mild pressure behind the eyes. Headache is not pulsating or throbbing. Light worsens the headache but position whether laying or standing does not affect the intensity of the headache. Denies any lightheadedness or room-spinning sensation, floaters, black dots, curtains on the vision, n/v, abd pain, chest pain, pain/numbness/tingling in all extremities. Lives at home, does not need help for ADLs, walks independently at baseline, never smoker, no EtOH or illicit drug use.     At [facility], CTH demonstrated L pareital IPH measuring up to 3.7 cm and adjacent subarachnoid hemorrhage. CTA demonstrated no LVO. CTP was not performed. MR brain showed consistent L parietal parasagittal acute hematoma with surrounding edema in L PCA territory. Dr. De Paz and Dr. Gan saw the pt and recommended transfer for agiongram with Dr. Reagan.  (14 Oct 2021 22:14)      PAST MEDICAL & SURGICAL HISTORY:  No pertinent past medical history    No significant past surgical history            MEDICATIONS:        levETIRAcetam 500 milliGRAM(s) Oral two times a day        influenza   Vaccine 0.5 milliLiter(s) IntraMuscular once      FAMILY HISTORY:  FH: hypertension (Father)        Non-contributory    SOCIAL HISTORY:    not a smoker    Allergies    No Known Allergies    Intolerances    	    REVIEW OF SYSTEMS:  CONSTITUTIONAL: No fever  EYES: No eye pain, visual disturbances, or discharge  ENMT:  No difficulty hearing, tinnitus  NECK: No pain or stiffness  RESPIRATORY: No cough, wheezing,  CARDIOVASCULAR: No chest pain, palpitations, passing out, dizziness, or leg swelling  GASTROINTESTINAL:  No nausea, vomiting, diarrhea or constipation. No melena.  GENITOURINARY: No dysuria, hematuria  NEUROLOGICAL: No stroke like symptoms, +HA  SKIN: No burning or lesions   ENDOCRINE: No heat or cold intolerance  MUSCULOSKELETAL: No joint pain or swelling  PSYCHIATRIC: No  anxiety, mood swings  HEME/LYMPH: No bleeding gums  ALLERGY AND IMMUNOLOGIC: No hives or eczema	    All other ROS negative    PHYSICAL EXAM:  T(C): 36.8 (10-15-21 @ 04:00), Max: 37 (10-14-21 @ 19:27)  HR: 63 (10-15-21 @ 12:16) (50 - 76)  BP: 118/75 (10-15-21 @ 12:16) (103/81 - 152/84)  RR: 24 (10-15-21 @ 10:49) (13 - 24)  SpO2: 98% (10-15-21 @ 12:16) (96% - 100%)  Wt(kg): --  I&O's Summary    14 Oct 2021 07:01  -  15 Oct 2021 07:00  --------------------------------------------------------  IN: 200 mL / OUT: 0 mL / NET: 200 mL        Appearance: Normal	  HEENT:   Normal oral mucosa, EOMI	  Cardiovascular:  S1 S2, No JVD,    Respiratory: Lungs clear to auscultation	  Psychiatry: Alert  Gastrointestinal:  Soft, Non-tender, + BS	  Skin: No rashes   Neurologic: Non-focal  Extremities:  No edema  Vascular: Peripheral pulses palpable    	    	  	  CARDIAC MARKERS:  Labs personally reviewed by me                                  13.0   6.89  )-----------( 273      ( 15 Oct 2021 06:12 )             41.7     10-15    139  |  106  |  9   ----------------------------<  100<H>  3.8   |  21<L>  |  0.93    Ca    9.0      15 Oct 2021 06:12  Phos  3.7     10-14  Mg     2.30     10-14            EKG: Personally reviewed by me -   Radiology: Personally reviewed by me - CXR clear lungs    MRI Head IMPRESSION:  Left parietal parasagittal acute hematoma with surrounding edema in the distribution of the left PCA territory. No abnormal enhancement in association. No vascular hypertrophy      Assessment /Plan:   56 y.o. F with no significant PMH (although does not follow with doctors) who presents as a transfer from [facility] for angiogram with dizziness, weakness, and disorientation x 2 days. She is also experiencing a HA that began 5 days ago. She currently denies any weaknesses, dizziness, palpitation or blurry vision. She is still experiencing HA 2/10. pt denies any cardiac history. Reports she gets her annual checkups and has been fine. Denies any smoking history, father has HTN. pt is obese and gets ESTEBAN at baseline, she is unable to walk 2-3 blocks 2/2 ESTEBAN. She reports occasional swelling of the legs and unable to lay flat when she sleeps 2/2 orthopnea.       Problem/Plan - 1:  ·  Problem: Intraparenchymal hemorrhage.   - serial CT head reveals intraparenchyma hemorrhage   -no evidence of cerebral venous thrombosis on CTV  -BP goal <160/90. Hydralazine 5 mg PRN q6h ordered  - monitor BP   -passed dysphagia screen, diet initiated  -Keppra 500 BID for seizure ppx  -neurology following.    Problem/Plan - 2:  ·  Problem: cardiac disease screening   - pt is morbidly obese at high risk factor for cardiac disease   - Echo ordered  to eval heart structures and function 2/2 orthopnea, ESTEBAN   - check lipid with am labs   - monitor BP     Advanced care planning/advanced directives discussed with patient/family. DNR status including forceful chest compressions to attempt to restart the heart, ventilator support/artificial breathing, electric shock, artificial nutrition, health care proxy, Molst form all discussed with pt. Pt wishes to consider.  More than fifteen minutes spent on discussing advanced directives. OMT on six regions for acute somatic dysfunctions done at the bedside       Lillian Up [facility]   Oliverio Nelson DO [facility]  Cardiovascular Medicine  [address] [place] [address] Dr, [address]  Office [phone]  Cell [phone] Date of Service :   10-15-21 @ 14:18  CHIEF COMPLAINT:Patient is a 56y old  Female who presents with a chief complaint of     HISTORY OF PRESENT ILLNESS:HPI:  56 y.o. R-H F with no PMH presented to [facility] as a transfer from [facility] for angiogram. Initially presented as a code stroke at [facility] on 10/13 with unknown LKN. Pt started to have disorientation for 3 days. Also endorsed headache that rated as severe as 10/10 at that time but improved over the 3 days. Unclear whether had full vision loss but complained of possible R peripheral vision problems. Also was experiencing room-spinning sensation that prevented her from walking at home. This vertigo was worsened by any eye movements. Currently has mild frontal headache that goes across the forehead with mild pressure behind the eyes. Headache is not pulsating or throbbing. Light worsens the headache but position whether laying or standing does not affect the intensity of the headache. Denies any lightheadedness or room-spinning sensation, floaters, black dots, curtains on the vision, n/v, abd pain, chest pain, pain/numbness/tingling in all extremities. Lives at home, does not need help for ADLs, walks independently at baseline, never smoker, no EtOH or illicit drug use.     At [facility], CTH demonstrated L pareital IPH measuring up to 3.7 cm and adjacent subarachnoid hemorrhage. CTA demonstrated no LVO. CTP was not performed. MR brain showed consistent L parietal parasagittal acute hematoma with surrounding edema in L PCA territory. Dr. De Paz and Dr. Gan saw the pt and recommended transfer for agiongram with Dr. Reagan.  (14 Oct 2021 22:14)      PAST MEDICAL & SURGICAL HISTORY:  No pertinent past medical history    No significant past surgical history            MEDICATIONS:        levETIRAcetam 500 milliGRAM(s) Oral two times a day        influenza   Vaccine 0.5 milliLiter(s) IntraMuscular once      FAMILY HISTORY:  FH: hypertension (Father)        Non-contributory    SOCIAL HISTORY:    not a smoker    Allergies    No Known Allergies    Intolerances    	    REVIEW OF SYSTEMS:  CONSTITUTIONAL: No fever  EYES: No eye pain, visual disturbances, or discharge  ENMT:  No difficulty hearing, tinnitus  NECK: No pain or stiffness  RESPIRATORY: No cough, wheezing,  CARDIOVASCULAR: No chest pain, palpitations, passing out, dizziness, or leg swelling  GASTROINTESTINAL:  No nausea, vomiting, diarrhea or constipation. No melena.  GENITOURINARY: No dysuria, hematuria  NEUROLOGICAL: No stroke like symptoms, +HA  SKIN: No burning or lesions   ENDOCRINE: No heat or cold intolerance  MUSCULOSKELETAL: No joint pain or swelling  PSYCHIATRIC: No  anxiety, mood swings  HEME/LYMPH: No bleeding gums  ALLERGY AND IMMUNOLOGIC: No hives or eczema	    All other ROS negative    PHYSICAL EXAM:  T(C): 36.8 (10-15-21 @ 04:00), Max: 37 (10-14-21 @ 19:27)  HR: 63 (10-15-21 @ 12:16) (50 - 76)  BP: 118/75 (10-15-21 @ 12:16) (103/81 - 152/84)  RR: 24 (10-15-21 @ 10:49) (13 - 24)  SpO2: 98% (10-15-21 @ 12:16) (96% - 100%)  Wt(kg): --  I&O's Summary    14 Oct 2021 07:01  -  15 Oct 2021 07:00  --------------------------------------------------------  IN: 200 mL / OUT: 0 mL / NET: 200 mL        Appearance: Normal	  HEENT:   Normal oral mucosa, EOMI	  Cardiovascular:  S1 S2, No JVD,    Respiratory: Lungs clear to auscultation	  Psychiatry: Alert  Gastrointestinal:  Soft, Non-tender, + BS	  Skin: No rashes   Neurologic: Non-focal  Extremities:  No edema  Vascular: Peripheral pulses palpable    	    	  	  CARDIAC MARKERS:  Labs personally reviewed by me                                  13.0   6.89  )-----------( 273      ( 15 Oct 2021 06:12 )             41.7     10-15    139  |  106  |  9   ----------------------------<  100<H>  3.8   |  21<L>  |  0.93    Ca    9.0      15 Oct 2021 06:12  Phos  3.7     10-14  Mg     2.30     10-14            EKG: Personally reviewed by me -   Radiology: Personally reviewed by me - CXR clear lungs    MRI Head IMPRESSION:  Left parietal parasagittal acute hematoma with surrounding edema in the distribution of the left PCA territory. No abnormal enhancement in association. No vascular hypertrophy      Assessment /Plan:   56 y.o. F with no significant PMH (although does not follow with doctors) who presents as a transfer from [facility] for angiogram with dizziness, weakness, and disorientation x 2 days. She is also experiencing a HA that began 5 days ago. She currently denies any weaknesses, dizziness, palpitation or blurry vision. She is still experiencing HA 2/10. pt denies any cardiac history. Reports she gets her annual checkups and has been fine. Denies any smoking history, father has HTN. pt is obese and gets ESTEBAN at baseline, she is unable to walk 2-3 blocks 2/2 ESTEBAN. She reports occasional swelling of the legs and unable to lay flat when she sleeps 2/2 orthopnea.       Problem/Plan - 1:  ·  Problem: Intraparenchymal hemorrhage.   - serial CT head reveals intraparenchyma hemorrhage   -no evidence of cerebral venous thrombosis on CTV  -BP goal <160/90. Hydralazine 5 mg PRN q6h ordered  - monitor BP   -passed dysphagia screen, diet initiated  -Keppra 500 BID for seizure ppx  -neurology following.    Problem/Plan - 2:  ·  Problem: ESTEBAN  - pt is morbidly obese at high risk factor for cardiac disease   - Echo ordered  to eval heart structures and function 2/2 orthopnea, ESTEBAN   - check lipid with am labs   - monitor BP     Advanced care planning/advanced directives discussed with patient/family. DNR status including forceful chest compressions to attempt to restart the heart, ventilator support/artificial breathing, electric shock, artificial nutrition, health care proxy, Molst form all discussed with pt. Pt wishes to consider.  More than fifteen minutes spent on discussing advanced directives. OMT on six regions for acute somatic dysfunctions done at the bedside       [facility]   Oliverio Nelson DO [facility]  Cardiovascular Medicine  [address] [place] [address] Dr, [address]  Office [phone]  Cell [phone]

## 2021-10-15 NOTE — OCCUPATIONAL THERAPY INITIAL EVALUATION ADULT - LIVES WITH, PROFILE
Pt lives with boyfriend in an apartment, 4 steps to enter building, elevator access to her floor. Per pt, her bathroom contains a bathtub, and her boyfriend can help with ADL and mobility as needed when she returns home.

## 2021-10-15 NOTE — OCCUPATIONAL THERAPY INITIAL EVALUATION ADULT - PRECAUTIONS/LIMITATIONS, REHAB EVAL
No acute CTA brain/neck or CT venogram results 10/13/21. CTH 10/13/21 revealed unchanged 2.5 cm parenchymal hematoma in the LEFT parietal lobe with minimal subdural hemorrhage along the posterior falx. No acute CXR findings 10/13/21. CTH 10/14/21 revealed Stable left parietal parenchymal hemorrhage measuring up to 3.7 cm and adjacent subarachnoid hemorrhage with new or worsening intracranial hemorrhage. MRI head 10/14/21 revealed Left parietal parasagittal acute hematoma with surrounding edema in the distribution of the left PCA territory, No abnormal enhancement in association and No vascular hypertrophy./fall precautions

## 2021-10-15 NOTE — OCCUPATIONAL THERAPY INITIAL EVALUATION ADULT - ADL RETRAINING, OT EVAL
Pt will independently don and doff socks at EOB within 4 weeks. Pt will independently tolerate 10 min of continuous standing in preparation for showering within 4 weeks.

## 2021-10-15 NOTE — PHYSICAL THERAPY INITIAL EVALUATION ADULT - PRECAUTIONS/LIMITATIONS, REHAB EVAL
Also endorsed headache that rated as severe as 10/10 at that time but improved over the 3 days. Unclear whether had full vision loss but complained of possible R peripheral vision problems. Also was experiencing room-spinning sensation that prevented her from walking at home. This vertigo was worsened by any eye movements. Currently has mild frontal headache that goes across the forehead with mild pressure behind the eyes. Headache is not pulsating or throbbing. Light worsens the headache but position whether laying or standing does not affect the intensity of the headache. Denies any lightheadedness or room-spinning sensation, floaters, black dots, curtains on the vision, n/v, abd pain, chest pain, pain/numbness/tingling in all extremities. Lives at home, does not need help for ADLs, walks independently at baseline, never smoker, no EtOH or illicit drug use.

## 2021-10-15 NOTE — PHYSICAL THERAPY INITIAL EVALUATION ADULT - ADDITIONAL COMMENTS
Pt lives in apt with boyfriend, 2 steps to enter. PTA, pt was I with al ADLs and functional mobility without use of AD.

## 2021-10-15 NOTE — OCCUPATIONAL THERAPY INITIAL EVALUATION ADULT - SHORT TERM MEMORY, REHAB EVAL
Poor performance on Short Blessed Test, further functional cognitive evaluation recommended/impaired Poor performance on Short Blessed Test (12/28), further functional cognitive evaluation recommended/impaired

## 2021-10-15 NOTE — CONSULT NOTE ADULT - NSCONSULTADDITIONALINFOA_GEN_ALL_CORE
Differential diagnosis and plan of care discussed with patient after the evaluation.   Advanced care planning/advanced directives discussed with patient/family. DNR status including forceful chest compressions to attempt to restart the heart, ventilator support/artificial breathing, electric shock, artificial nutrition, health care proxy, Molst form all discussed with pt. Pt wishes to consider.   OMT on six regions for acute somatic dysfunctions done at the bedside. Importance of Fall prevention discussed. I had a prolonged conversation with patient. More than 50% of the visit was spent counseling and/or coordinating care by the attending physician.

## 2021-10-15 NOTE — OCCUPATIONAL THERAPY INITIAL EVALUATION ADULT - ANTICIPATED DISCHARGE DISPOSITION, OT EVAL
Home w/ no OT vs. outpatient neuro OT pending functional cognitive eval; assist from boyfriend with ADL and mobility as needed

## 2021-10-15 NOTE — OCCUPATIONAL THERAPY INITIAL EVALUATION ADULT - COGNITIVE, VISUAL PERCEPTUAL, OT EVAL
Pt will independently recall today's date 100% of the time when prompted. Pt will independently recall today's date 100% of the time when prompted; GOAL: pt will independently recall 3/3 medications independently within 4 weeks.

## 2021-10-15 NOTE — CONSULT NOTE ADULT - SUBJECTIVE AND OBJECTIVE BOX
Patient is a 56 y.o. F with no PMH presented to [facility] as a transfer from [facility] for angiogram. Initially presented as a code stroke at [facility] on 10/13 with unknown LKN. Pt started to have disorientation for 3 days. Also endorsed headache that rated as severe as 10/10 at that time but improved over the 3 days. Unclear whether had full vision loss but complained of possible R peripheral vision problems. Also was experiencing room-spinning sensation that prevented her from walking at home. This vertigo was worsened by any eye movements. Currently has mild frontal headache that goes across the forehead with mild pressure behind the eyes. Headache is not pulsating or throbbing. Light worsens the headache but position whether laying or standing does not affect the intensity of the headache. Denies any lightheadedness or room-spinning sensation, floaters, black dots, curtains on the vision, n/v, abd pain, chest pain, pain/numbness/tingling in all extremities. Lives at home, does not need help for ADLs, walks independently at baseline, never smoker, no EtOH or illicit drug use.  At [facility], CTH demonstrated L pareital IPH measuring up to 3.7 cm and adjacent subarachnoid hemorrhage. CTA demonstrated no LVO. CTP was not performed. MR brain showed consistent L parietal parasagittal acute hematoma with surrounding edema in L PCA territory. Dr. De Paz and Dr. Gan saw the pt and recommended transfer for agiongram with Dr. Reagan.       MEDICATIONS  (STANDING):  influenza   Vaccine 0.5 milliLiter(s) IntraMuscular once  levETIRAcetam 500 milliGRAM(s) Oral two times a day    Allergies    No Known Allergies    Intolerances    Vital Signs Last 24 Hrs  T(C): 36.8 (15 Oct 2021 04:00), Max: 37 (14 Oct 2021 19:27)  T(F): 98.2 (15 Oct 2021 04:00), Max: 98.6 (14 Oct 2021 19:27)  HR: 58 (15 Oct 2021 08:00) (50 - 76)  BP: 142/85 (15 Oct 2021 08:00) (103/81 - 152/84)  BP(mean): 100 (15 Oct 2021 08:00) (88 - 100)  RR: 15 (15 Oct 2021 08:00) (13 - 22)  SpO2: 97% (15 Oct 2021 06:00) (96% - 100%) Patient is a 56 y.o. F with no PMH presented to [facility] as a transfer from [facility] for angiogram. Initially presented as a code stroke at [facility] on 10/13 with unknown LKN. Pt started to have disorientation for 3 days. Also endorsed headache that rated as severe as 10/10 at that time but improved over the 3 days. Unclear whether had full vision loss but complained of possible R peripheral vision problems. Also was experiencing room-spinning sensation that prevented her from walking at home. This vertigo was worsened by any eye movements. Currently has mild frontal headache that goes across the forehead with mild pressure behind the eyes. Headache is not pulsating or throbbing. Light worsens the headache but position whether laying or standing does not affect the intensity of the headache. Denies any lightheadedness or room-spinning sensation, floaters, black dots, curtains on the vision, n/v, abd pain, chest pain, pain/numbness/tingling in all extremities. Lives at home, does not need help for ADLs, walks independently at baseline, never smoker, no EtOH or illicit drug use.  At [facility], CTH demonstrated L pareital IPH measuring up to 3.7 cm and adjacent subarachnoid hemorrhage. CTA demonstrated no LVO. CTP was not performed. MR brain showed consistent L parietal parasagittal acute hematoma with surrounding edema in L PCA territory. Dr. De Paz and Dr. Gan saw the pt and recommended transfer for agiongram with Dr. Reagan.       MEDICATIONS  (STANDING):  influenza   Vaccine 0.5 milliLiter(s) IntraMuscular once  levETIRAcetam 500 milliGRAM(s) Oral two times a day    Allergies    No Known Allergies    Intolerances    Vital Signs Last 24 Hrs  T(C): 36.8 (15 Oct 2021 04:00), Max: 37 (14 Oct 2021 19:27)  T(F): 98.2 (15 Oct 2021 04:00), Max: 98.6 (14 Oct 2021 19:27)  HR: 58 (15 Oct 2021 08:00) (50 - 76)  BP: 142/85 (15 Oct 2021 08:00) (103/81 - 152/84)  BP(mean): 100 (15 Oct 2021 08:00) (88 - 100)  RR: 15 (15 Oct 2021 08:00) (13 - 22)  SpO2: 97% (15 Oct 2021 06:00) (96% - 100%)    Appearance: Normal	  HEENT:   Normal oral mucosa, PERRL, EOMI	  Lymphatic: No lymphadenopathy , no edema  Cardiovascular: Normal S1 S2, No JVD, No murmurs , Peripheral pulses palpable 2+ bilaterally  Respiratory: Lungs clear to auscultation, normal effort 	  Gastrointestinal:  Soft, Non-tender, + BS	  Skin: No rashes, No ecchymoses, No cyanosis, warm to touch  Musculoskeletal: Normal range of motion, normal strength  Psychiatry:  Mood & affect appropriate  Ext: No edema                          13.0   6.89  )-----------( 273      ( 15 Oct 2021 06:12 )             41.7               10-15    139  |  106  |  9   ----------------------------<  100<H>  3.8   |  21<L>  |  0.93    Ca    9.0      15 Oct 2021 06:12  Phos  3.7     10-14  Mg     2.30     10-14           < from: CT Head No Cont (10.14.21 @ 10:01) >    IMPRESSION:    -Stable left parietal parenchymal hemorrhage measuring up to 3.7 cm and adjacent subarachnoid hemorrhage. No new or worsening intracranial hemorrhage.

## 2021-10-15 NOTE — PHYSICAL THERAPY INITIAL EVALUATION ADULT - PERTINENT HX OF CURRENT PROBLEM, REHAB EVAL
56 y.o. R-H F with no PMH presented to [facility] as a transfer from [facility] for angiogram. Initially presented as a code stroke at [facility] on 10/13 with unknown LKN. Pt started to have disorientation for 3 days

## 2021-10-15 NOTE — CONSULT NOTE ADULT - ASSESSMENT
Patient is a 56 y.o. F with no PMH presented to [facility] as a transfer from [facility] for angiogram. Initially presented as a code stroke at [facility] on 10/13 with unknown LKN. Pt started to have disorientation for 3 days. Also endorsed headache that rated as severe as 10/10 at that time but improved over the 3 days. Unclear whether had full vision loss but complained of possible R peripheral vision problems. Also was experiencing room-spinning sensation that prevented her from walking at home. This vertigo was worsened by any eye movements. Currently has mild frontal headache that goes across the forehead with mild pressure behind the eyes. Headache is not pulsating or throbbing. Light worsens the headache but position whether laying or standing does not affect the intensity of the headache. Denies any lightheadedness or room-spinning sensation, floaters, black dots, curtains on the vision, n/v, abd pain, chest pain, pain/numbness/tingling in all extremities. Lives at home, does not need help for ADLs, walks independently at baseline, never smoker, no EtOH or illicit drug use.  At [facility], CTH demonstrated L pareital IPH measuring up to 3.7 cm and adjacent subarachnoid hemorrhage. CTA demonstrated no LVO. CTP was not performed. MR brain showed consistent L parietal parasagittal acute hematoma with surrounding edema in L PCA territory. Dr. De Paz and Dr. Gan saw the pt and recommended transfer for agiongram with Dr. Reagan.     Impression: Subacute vertigo, gait instability, and b/l frontal headache likely due to L parietal parasagittal acute hematoma. Localization is L PCA territory. Etiology for hematoma include AVM vs HTN vs trauma vs r/o neoplastic     Recommendations:  [] If sudden mental status changes, would repeat CTH , noted repeat CT no acute changes   [] Would hold DVT ppx for now   [] Would hold statin for now  [] Symptomatic tx for headache with IV tylenol  [] TTE  [] +/- ILR depending on TTE  [] MRI brain noted   [] Card eval for BP control  [] neurology follow up, plan for Cerebral angio on monday  [] card preop called , appreciated recs  [] monitor vitals, check for elevated BP    [] check echo for structurral heart disease         Labs  [] CBC, BMP, and lipid profile noted     Other  [] Telemonitoring;  Neurochecks and Vital signs per unit protocol  [] Permissive HTN up to SBP < 160 mmHg  [] BGM goals 140-180  [] PT/OT evaluation  [] resume diet, passed speech and swallow   [] Stroke education provided

## 2021-10-16 LAB
ANION GAP SERPL CALC-SCNC: 14 MMOL/L — SIGNIFICANT CHANGE UP (ref 5–17)
BUN SERPL-MCNC: 16 MG/DL — SIGNIFICANT CHANGE UP (ref 7–23)
CALCIUM SERPL-MCNC: 8.9 MG/DL — SIGNIFICANT CHANGE UP (ref 8.4–10.5)
CHLORIDE SERPL-SCNC: 108 MMOL/L — SIGNIFICANT CHANGE UP (ref 96–108)
CO2 SERPL-SCNC: 20 MMOL/L — LOW (ref 22–31)
CREAT SERPL-MCNC: 1.01 MG/DL — SIGNIFICANT CHANGE UP (ref 0.5–1.3)
GLUCOSE SERPL-MCNC: 122 MG/DL — HIGH (ref 70–99)
HCT VFR BLD CALC: 40.2 % — SIGNIFICANT CHANGE UP (ref 34.5–45)
HGB BLD-MCNC: 12.1 G/DL — SIGNIFICANT CHANGE UP (ref 11.5–15.5)
MCHC RBC-ENTMCNC: 25.1 PG — LOW (ref 27–34)
MCHC RBC-ENTMCNC: 30.1 GM/DL — LOW (ref 32–36)
MCV RBC AUTO: 83.4 FL — SIGNIFICANT CHANGE UP (ref 80–100)
NRBC # BLD: 0 /100 WBCS — SIGNIFICANT CHANGE UP (ref 0–0)
PLATELET # BLD AUTO: 236 K/UL — SIGNIFICANT CHANGE UP (ref 150–400)
POTASSIUM SERPL-MCNC: 4.1 MMOL/L — SIGNIFICANT CHANGE UP (ref 3.5–5.3)
POTASSIUM SERPL-SCNC: 4.1 MMOL/L — SIGNIFICANT CHANGE UP (ref 3.5–5.3)
RBC # BLD: 4.82 M/UL — SIGNIFICANT CHANGE UP (ref 3.8–5.2)
RBC # FLD: 15.4 % — HIGH (ref 10.3–14.5)
SODIUM SERPL-SCNC: 142 MMOL/L — SIGNIFICANT CHANGE UP (ref 135–145)
WBC # BLD: 5.99 K/UL — SIGNIFICANT CHANGE UP (ref 3.8–10.5)
WBC # FLD AUTO: 5.99 K/UL — SIGNIFICANT CHANGE UP (ref 3.8–10.5)

## 2021-10-16 PROCEDURE — 74177 CT ABD & PELVIS W/CONTRAST: CPT | Mod: 26

## 2021-10-16 PROCEDURE — 70450 CT HEAD/BRAIN W/O DYE: CPT | Mod: 26

## 2021-10-16 PROCEDURE — 71260 CT THORAX DX C+: CPT | Mod: 26

## 2021-10-16 PROCEDURE — 99233 SBSQ HOSP IP/OBS HIGH 50: CPT

## 2021-10-16 RX ORDER — ENOXAPARIN SODIUM 100 MG/ML
40 INJECTION SUBCUTANEOUS DAILY
Refills: 0 | Status: DISCONTINUED | OUTPATIENT
Start: 2021-10-16 | End: 2021-10-18

## 2021-10-16 RX ORDER — ACETAMINOPHEN 500 MG
1000 TABLET ORAL ONCE
Refills: 0 | Status: COMPLETED | OUTPATIENT
Start: 2021-10-16 | End: 2021-10-16

## 2021-10-16 RX ADMIN — ENOXAPARIN SODIUM 40 MILLIGRAM(S): 100 INJECTION SUBCUTANEOUS at 17:21

## 2021-10-16 RX ADMIN — Medication 400 MILLIGRAM(S): at 11:20

## 2021-10-16 RX ADMIN — Medication 400 MILLIGRAM(S): at 20:08

## 2021-10-16 RX ADMIN — LEVETIRACETAM 500 MILLIGRAM(S): 250 TABLET, FILM COATED ORAL at 17:21

## 2021-10-16 RX ADMIN — Medication 650 MILLIGRAM(S): at 05:15

## 2021-10-16 RX ADMIN — Medication 1000 MILLIGRAM(S): at 12:00

## 2021-10-16 RX ADMIN — LEVETIRACETAM 500 MILLIGRAM(S): 250 TABLET, FILM COATED ORAL at 05:15

## 2021-10-16 RX ADMIN — Medication 650 MILLIGRAM(S): at 06:15

## 2021-10-16 NOTE — PROGRESS NOTE ADULT - ASSESSMENT
Patient is a 56 y.o. F with no PMH presented to [facility] as a transfer from [facility] for angiogram. Initially presented as a code stroke at [facility] on 10/13 with unknown LKN. Pt started to have disorientation for 3 days. Also endorsed headache that rated as severe as 10/10 at that time but improved over the 3 days. Unclear whether had full vision loss but complained of possible R peripheral vision problems. Also was experiencing room-spinning sensation that prevented her from walking at home. This vertigo was worsened by any eye movements. Currently has mild frontal headache that goes across the forehead with mild pressure behind the eyes. Headache is not pulsating or throbbing. Light worsens the headache but position whether laying or standing does not affect the intensity of the headache. Denies any lightheadedness or room-spinning sensation, floaters, black dots, curtains on the vision, n/v, abd pain, chest pain, pain/numbness/tingling in all extremities. Lives at home, does not need help for ADLs, walks independently at baseline, never smoker, no EtOH or illicit drug use.  At [facility], CTH demonstrated L pareital IPH measuring up to 3.7 cm and adjacent subarachnoid hemorrhage. CTA demonstrated no LVO. CTP was not performed. MR brain showed consistent L parietal parasagittal acute hematoma with surrounding edema in L PCA territory. Dr. De Paz and Dr. Gan saw the pt and recommended transfer for agiongram with Dr. Reagan.     Impression: Subacute vertigo, gait instability, and b/l frontal headache likely due to L parietal parasagittal acute hematoma. Localization is L PCA territory. Etiology for hematoma include AVM vs HTN vs trauma vs r/o neoplastic     Recommendations:  [] If sudden mental status changes, would repeat CTH , noted repeat CT no acute changes   [] DVT PPX   [] Would hold statin for now  [] Symptomatic tx for headache with IV tylenol  [] TTE  [] +/- ILR depending on TTE  [] MRI brain noted   [] Card eval for BP control  [] neurology follow up, plan for Cerebral angio on monday  [] card preop called , appreciated recs  [] monitor vitals, check for elevated BP    [] check echo for structurral heart disease   [] Pan CT noted no malignancy, CT head noted       Labs  [] CBC, BMP, and lipid profile noted     Other  [] Telemonitoring;  Neurochecks and Vital signs per unit protocol  [] Permissive HTN up to SBP < 160 mmHg  [] BGM goals 140-180  [] PT/OT evaluation  [] resume diet, passed speech and swallow   [] Stroke education provided

## 2021-10-16 NOTE — PROGRESS NOTE ADULT - ASSESSMENT
ASSESSMENT: 56 y.o. R-H F with no PMH presented to [facility] as a transfer from [facility] for angiogram. Initially presented as a code stroke at [facility] on 10/13 with unknown LKN. Pt started to have disorientation for 3 days. Also endorsed headache that rated as severe as 10/10 at that time but improved over the 3 days. Unclear whether had full vision loss but complained of possible R peripheral vision problems. Also was experiencing room-spinning sensation that prevented her from walking at home. This vertigo was worsened by any eye movements. Currently has mild frontal headache that goes across the forehead with mild pressure behind the eyes. Headache is not pulsating or throbbing. Light worsens the headache but position whether laying or standing does not affect the intensity of the headache. Denies any lightheadedness or room-spinning sensation, floaters, black dots, curtains on the vision, n/v, abd pain, chest pain, pain/numbness/tingling in all extremities. Lives at home, does not need help for ADLs, walks independently at baseline, never smoker, no EtOH or illicit drug use.   At [facility], CTH demonstrated L pareital IPH measuring up to 3.7 cm and adjacent subarachnoid hemorrhage. CTA demonstrated no LVO. CTP was not performed. MR brain showed consistent L parietal parasagittal acute hematoma with surrounding edema in L PCA territory. Dr. De Paz and Dr. Gan saw the pt and recommended transfer for agiongram with Dr. Reagan.      Impression: beginning on or about 10/12/2021 she developed severe headache.  Denied cognitive changes, visual disturbance or vertigo.  CT head showed a small left parasagittal, either parietal or perhaps perirolandic lobar hemorrhage.  The etiology of the hemorrhage is uncertain, but a small vascular malformation, including a micro-AVM should be screened for.  Doubt hemorrhage into a metastasis.      NEURO: Continue close monitoring for neurologic deterioration, BP <160,  titrate statin to LDL goal less than 70, MRI Brain w/o, MRA Head w/o and Neck w/contrast, results as noted above Physical therapy/OT recommend outpatient PT.     ANTITHROMBOTIC THERAPY: None in the setting of hemorrhage.    PULMONARY:  protecting airway, saturating well     CARDIOVASCULAR: TTE: EF 70%, 1. Normal left ventricular systolic function. No segmental wall motion abnormalities 2. Normal right ventricular size and function.3. IVC small with > 50% collapse. Estimated right atrial pressure is low suggestive of volume depletion. 4. Estimated pulmonary artery systolic pressure equals 16 mm Hg, assuming right atrial pressure equals 3  mm Hg, consistent with normal pulmonary pressures., cardiac monitoring without any recorded events.                              SBP goal: <160/90     GASTROINTESTINAL:  dysphagia screen passed, tolerating diet     Diet: Regular     RENAL: BUN/Cr within normal limits, good urine output      Na Goal: Greater than 135     Araujo: No    HEMATOLOGY: H/H within normal limits, Platelets 236     DVT ppx: Venodynes     ID: afebrile, no leukocytosis, no signs or symptoms of infection    OTHER: All questions and concerns addressed with patient at bedside.     DISPOSITION: Rehab or home depending on PT eval once stable and workup is complete    CORE MEASURES:        Admission NIHSS:      TPA: [] YES [x] NO      LDL/HDL: 85/107     Depression Screen: p     Statin Therapy: n     Dysphagia Screen: [x] PASS [] FAIL     Smoking [] YES [x] NO      Afib [] YES [x] NO     Stroke Education [x] YES [] NO    Obtain screening lower extremity venous ultrasound in patients who meet 1 or more of the following criteria as patient is high risk for DVT/PE on admission:   [] History of DVT/PE  []Hypercoagulable states (Factor V Leiden, Cancer, OCP, etc. )  []Prolonged immobility (hemiplegia/hemiparesis/post operative or any other extended immobilization)  [] Transferred from outside facility (Rehab or Long term care)  [] Age </= to 50.   ASSESSMENT: 56 y.o. R-H F with no PMH presented to [facility] as a transfer from [facility] for angiogram. Initially presented as a code stroke at [facility] on 10/13 with unknown LKN. Pt started to have disorientation for 3 days. Also endorsed headache that rated as severe as 10/10 at that time but improved over the 3 days. Unclear whether had full vision loss but complained of possible R peripheral vision problems. Also was experiencing room-spinning sensation that prevented her from walking at home. This vertigo was worsened by any eye movements. Currently has mild frontal headache that goes across the forehead with mild pressure behind the eyes. Headache is not pulsating or throbbing. Light worsens the headache but position whether laying or standing does not affect the intensity of the headache. Denies any lightheadedness or room-spinning sensation, floaters, black dots, curtains on the vision, n/v, abd pain, chest pain, pain/numbness/tingling in all extremities. Lives at home, does not need help for ADLs, walks independently at baseline, never smoker, no EtOH or illicit drug use.   At [facility], CTH demonstrated L pareital IPH measuring up to 3.7 cm and adjacent subarachnoid hemorrhage. CTA demonstrated no LVO. CTP was not performed. MR brain showed consistent L parietal parasagittal acute hematoma with surrounding edema in L PCA territory. Dr. De Paz and Dr. Gan saw the pt and recommended transfer for agiongram with Dr. Reagan.      Impression: beginning on or about 10/12/2021 she developed severe headache.  Denied cognitive changes, visual disturbance or vertigo.  CT head showed a small left parasagittal, either parietal or perhaps perirolandic lobar hemorrhage.  The etiology of the hemorrhage is uncertain, but a small vascular malformation, including a micro-AVM should be screened for.     NEURO: Neurologically stable since admission. Continue close monitoring for neurologic deterioration, BP <160,  titrate statin to LDL goal less than 70, MRI Brain w/o, MRA Head w/o and Neck w/contrast, results as noted above.  Repeat CTH on 10/16 will start lovenox for DVT ppx. CT c/a/p negative for malignancy.  Physical therapy/OT recommend outpatient PT.     ANTITHROMBOTIC THERAPY: None in the setting of hemorrhage.    PULMONARY:  protecting airway, saturating well     CARDIOVASCULAR: TTE: EF 70%, 1. Normal left ventricular systolic function. No segmental wall motion abnormalities 2. Normal right ventricular size and function.3. IVC small with > 50% collapse. Estimated right atrial pressure is low suggestive of volume depletion. 4. Estimated pulmonary artery systolic pressure equals 16 mm Hg, assuming right atrial pressure equals 3  mm Hg, consistent with normal pulmonary pressures, will continue cardiac monitoring- without any recorded events.                              SBP goal: <160/90     GASTROINTESTINAL:  dysphagia screen passed, tolerating diet     Diet: Regular     RENAL: BUN/Cr within normal limits, good urine output      Na Goal: Greater than 135     Araujo: No    HEMATOLOGY: H/H within normal limits, Platelets 236      DVT ppx: lovenox sq    ID: afebrile, no leukocytosis, no signs or symptoms of infection    OTHER: All questions and concerns addressed with patient at bedside.     DISPOSITION: Rehab or home depending on PT eval once stable and workup is complete    CORE MEASURES:        Admission NIHSS:      TPA: [] YES [x] NO      LDL/HDL: 85/107     Depression Screen: p     Statin Therapy: n     Dysphagia Screen: [x] PASS [] FAIL     Smoking [] YES [x] NO      Afib [] YES [x] NO     Stroke Education [x] YES [] NO    Obtain screening lower extremity venous ultrasound in patients who meet 1 or more of the following criteria as patient is high risk for DVT/PE on admission:   [] History of DVT/PE  []Hypercoagulable states (Factor V Leiden, Cancer, OCP, etc. )  []Prolonged immobility (hemiplegia/hemiparesis/post operative or any other extended immobilization)  [] Transferred from outside facility (Rehab or Long term care)  [] Age </= to 50.

## 2021-10-16 NOTE — PROGRESS NOTE ADULT - SUBJECTIVE AND OBJECTIVE BOX
Date of Service   10-16-21 @ 12:43    Patient is a 56y old  Female who presents with a chief complaint of Headache (15 Oct 2021 14:18)      INTERVAL HISTORY: pt feels ok still with c/o HA    TELEMETRY Personally reviewed: SR 60'S     REVIEW OF SYSTEMS:   CONSTITUTIONAL: No weakness  EYES/ENT: No visual changes; No throat pain  Neck: No pain or stiffness  Respiratory: No cough, wheezing, No shortness of breath  CARDIOVASCULAR: no chest pain or palpitations  GASTROINTESTINAL: No abdominal pain, no nausea, vomiting or hematemesis  GENITOURINARY: No dysuria, frequency or hematuria  NEUROLOGICAL: No stroke like symptoms  SKIN: No rashes    	  MEDICATIONS:        PHYSICAL EXAM:  T(C): 37.1 (10-16-21 @ 08:07), Max: 37.1 (10-15-21 @ 14:00)  HR: 67 (10-16-21 @ 12:00) (54 - 74)  BP: 130/80 (10-16-21 @ 12:00) (100/75 - 135/82)  RR: 14 (10-16-21 @ 12:00) (14 - 24)  SpO2: 100% (10-16-21 @ 12:00) (95% - 100%)  Wt(kg): --  I&O's Summary    15 Oct 2021 07:01  -  16 Oct 2021 07:00  --------------------------------------------------------  IN: 820 mL / OUT: 0 mL / NET: 820 mL          Appearance: In no distress	  HEENT:    PERRL, EOMI	  Cardiovascular:  S1 S2, No JVD  Respiratory: Lungs clear to auscultation	  Gastrointestinal:  Soft, Non-tender, + BS	  Vascularature:  No edema of LE  Psychiatric: Appropriate affect   Neuro: no acute focal deficits                               12.1   5.99  )-----------( 236      ( 16 Oct 2021 05:15 )             40.2     10-16    142  |  108  |  16  ----------------------------<  122<H>  4.1   |  20<L>  |  1.01    Ca    8.9      16 Oct 2021 05:15          Labs personally reviewed  radiology   1    ASSESSMENT/PLAN: 	    56 y.o. F with no significant PMH (although does not follow with doctors) who presents as a transfer from [facility] for angiogram with dizziness, weakness, and disorientation x 2 days. She is also experiencing a HA that began 5 days ago. She currently denies any weaknesses, dizziness, palpitation or blurry vision. She is still experiencing HA 2/10. pt denies any cardiac history. Reports she gets her annual checkups and has been fine. Denies any smoking history, father has HTN. pt is obese and gets ESTEBAN at baseline, she is unable to walk 2-3 blocks 2/2 ESTEBAN. She reports occasional swelling of the legs and unable to lay flat when she sleeps 2/2 orthopnea.       Problem/Plan - 1:  ·  Problem: Intraparenchymal hemorrhage.   - serial CT head reveals intraparenchyma hemorrhage   -no evidence of cerebral venous thrombosis on CTV  -BP goal <160/90. Hydralazine 5 mg PRN q6h ordered  - monitor BP   -passed dysphagia screen, diet initiated  -Keppra 500 BID for seizure ppx  -neurology following.    Problem/Plan - 2:  ·  Problem: ESTEBAN  - pt is morbidly obese woman at high risk factor for cardiac disease   - monitor BP   - ECHO normal   - Lipids panel normal LDL goal less than 70 per neuro             Lillian Up FN-BC   Oliverio Nelson DO [facility]  Cardiovascular Medicine  [address] [place] [address], [address]  Office: [phone]  Cell: [phone] Date of Service   10-16-21 @ 12:43    Patient is a 56y old  Female who presents with a chief complaint of Headache (15 Oct 2021 14:18)      INTERVAL HISTORY: pt feels ok still with c/o HA    TELEMETRY Personally reviewed: SR 60'S     REVIEW OF SYSTEMS:   CONSTITUTIONAL: No weakness  EYES/ENT: No visual changes; No throat pain  Neck: No pain or stiffness  Respiratory: No cough, wheezing, No shortness of breath  CARDIOVASCULAR: no chest pain or palpitations  GASTROINTESTINAL: No abdominal pain, no nausea, vomiting or hematemesis  GENITOURINARY: No dysuria, frequency or hematuria  NEUROLOGICAL: No stroke like symptoms  SKIN: No rashes    	  MEDICATIONS:        PHYSICAL EXAM:  T(C): 37.1 (10-16-21 @ 08:07), Max: 37.1 (10-15-21 @ 14:00)  HR: 67 (10-16-21 @ 12:00) (54 - 74)  BP: 130/80 (10-16-21 @ 12:00) (100/75 - 135/82)  RR: 14 (10-16-21 @ 12:00) (14 - 24)  SpO2: 100% (10-16-21 @ 12:00) (95% - 100%)  Wt(kg): --  I&O's Summary    15 Oct 2021 07:01  -  16 Oct 2021 07:00  ------------------------------------------------  IN: 820 mL / OUT: 0 mL / NET: 820 mL          Appearance: In no distress	  HEENT:    PERRL, EOMI	  Cardiovascular:  S1 S2, No JVD  Respiratory: Lungs clear to auscultation	  Gastrointestinal:  Soft, Non-tender, + BS	  Vascularature:  No edema of LE  Psychiatric: Appropriate affect   Neuro: no acute focal deficits                               12.1   5.99  )-----------( 236      ( 16 Oct 2021 05:15 )             40.2     10-16    142  |  108  |  16  ----------------------------<  122<H>  4.1   |  20<L>  |  1.01    Ca    8.9      16 Oct 2021 05:15          Labs personally reviewed  radiology   1    ASSESSMENT/PLAN: 	    56 y.o. F with no significant PMH (although does not follow with doctors) who presents as a transfer from [facility] for angiogram with dizziness, weakness, and disorientation x 2 days. She is also experiencing a HA that began 5 days ago. She currently denies any weaknesses, dizziness, palpitation or blurry vision. She is still experiencing HA 2/10. pt denies any cardiac history. Reports she gets her annual checkups and has been fine. Denies any smoking history, father has HTN. pt is obese and gets ESTEBAN at baseline, she is unable to walk 2-3 blocks 2/2 ESTEBAN. She reports occasional swelling of the legs and unable to lay flat when she sleeps 2/2 orthopnea.       Problem/Plan - 1:  ·  Problem: Intraparenchymal hemorrhage.   - serial CT head reveals intraparenchyma hemorrhage   -no evidence of cerebral venous thrombosis on CTV  -BP goal <160/90. Hydralazine 5 mg PRN q6h ordered  - monitor BP   -passed dysphagia screen, diet initiated  -Keppra 500 BID for seizure ppx  -neurology following.    Problem/Plan - 2:  ·  Problem: ESTEBAN  - pt is morbidly obese woman at high risk factor for cardiac disease   - monitor BP   - ECHO normal   - Lipids panel normal LDL goal less than 70 per neuro             Lillian Up FN-BC   Oliverio Nelson DO [facility]  Cardiovascular Medicine  [address] [place] [address], [address]  Office: [phone]  Cell: [phone]

## 2021-10-16 NOTE — PROGRESS NOTE ADULT - SUBJECTIVE AND OBJECTIVE BOX
THE PATIENT WAS SEEN AND EXAMINED BY ME WITH THE HOUSESTAFF AND STROKE TEAM DURING MORNING ROUNDS.   HPI: 56 y.o. R-H F with no PMH presented to [facility] as a transfer from [facility] for angiogram. Initially presented as a code stroke at [facility] on 10/13 with unknown LKN. Pt started to have disorientation for 3 days. Also endorsed headache that rated as severe as 10/10 at that time but improved over the 3 days. Unclear whether had full vision loss but complained of possible R peripheral vision problems. Also was experiencing room-spinning sensation that prevented her from walking at home. This vertigo was worsened by any eye movements. Currently has mild frontal headache that goes across the forehead with mild pressure behind the eyes. Headache is not pulsating or throbbing. Light worsens the headache but position whether laying or standing does not affect the intensity of the headache. Denies any lightheadedness or room-spinning sensation, floaters, black dots, curtains on the vision, n/v, abd pain, chest pain, pain/numbness/tingling in all extremities. Lives at home, does not need help for ADLs, walks independently at baseline, never smoker, no EtOH or illicit drug use.     At [facility], CTH demonstrated L pareital IPH measuring up to 3.7 cm and adjacent subarachnoid hemorrhage. CTA demonstrated no LVO. CTP was not performed. MR brain showed consistent L parietal parasagittal acute hematoma with surrounding edema in L PCA territory. Dr. De Paz and Dr. Gan saw the pt and recommended transfer for agiongram with Dr. Reagan.      SUBJECTIVE: No events overnight.  No new neurologic complaints.      acetaminophen   Tablet .. 650 milliGRAM(s) Oral every 6 hours PRN  influenza   Vaccine 0.5 milliLiter(s) IntraMuscular once  levETIRAcetam 500 milliGRAM(s) Oral two times a day    PHYSICAL EXAM:   Vital Signs Last 24 Hrs  T(C): 36.8 (16 Oct 2021 04:00), Max: 37.1 (15 Oct 2021 14:00)  T(F): 98.2 (16 Oct 2021 04:00), Max: 98.7 (15 Oct 2021 14:00)  HR: 60 (16 Oct 2021 06:00) (59 - 66)  BP: 124/79 (16 Oct 2021 06:00) (100/75 - 142/85)  BP(mean): 94 (16 Oct 2021 06:00) (80 - 95)  RR: 18 (16 Oct 2021 06:00) (18 - 24)  SpO2: 100% (16 Oct 2021 06:00) (95% - 100%)    General: No acute distress  HEENT: EOM intact, visual fields full  Abdomen: Soft, nontender, nondistended   Extremities: No edema    NEUROLOGICAL EXAM:  Mental status: Awake, alert, oriented x3, no aphasia, no neglect, normal memory   Cranial Nerves: No facial asymmetry, no nystagmus, no dysarthria,  tongue midline  Motor exam: Normal tone, no drift, 5/5 RUE, 5/5 RLE, 5/5 LUE, 5/5 LLE, normal fine finger movements.  Sensation: Intact to light touch   Coordination/ Gait: No dysmetria, SILVESTRE intact and symmetric bilaterally    MS: Eyes open, responds to verbal stimuli, but closes them due to photophobia. Alert, oriented to self, place, situation, and time. Follows all commands  Language: Speech is clear, fluent with good repetition & comprehension   CNs: PERRL (R = 3mm, L = 3mm). VFF. EOMI no nystagmus. V1-3 intact to LT. mild L facial asymmetry but able to overcome. Hearing grossly normal (rubbing fingers) b/l. Head turning & shoulder shrug intact b/l. Tongue midline, normal movements, no atrophy  Motor: normal tone. No tremors. 5/5 throughout biceps, BR, , hip flexion, knee extension, dorsi/plantar flexion b/l  Sensation: Intact to light touch throughout. No right left confusion  Reflex: 2 biceps and BR b/l. 1 patellar and achilles b/l. No clonus. Plantar response down b/l  Extinction: negative on DSS  Coordination: no dysmetria upon FTN and HTS    LABS:                        12.1   5.99  )-----------( 236      ( 16 Oct 2021 05:15 )             40.2    10-16    142  |  108  |  16  ----------------------------<  122<H>  4.1   |  20<L>  |  1.01    Ca    8.9      16 Oct 2021 05:15      IMAGING: Reviewed by me.     (10/13/21) @[facility]):   CT brain: There is an intraparenchymal hemorrhage in the left parietal lobule with perihemorrhagic edema as described. There is no extension of the hemorrhage into the ventricles, midline shift or herniation. There is no acute lobar infarction.  CTA brain: There is no large vessel occlusion or aneurysm involving major proximal intracranial arteries. There is no dominant arteriovenous malformation.  CTA NECK: There is no stenosis involving major neck arteries.  CT venogram: There is no high-grade stenosis or occlusion within the dural venous sinuses.    MR Head w/wo IV Cont (10.14.21 @ [facility]):  Left parietal parasagittal acute hematoma with surrounding edema in the distribution of the left PCA territory. No abnormal enhancement in association. No vascular hypertrophy   THE PATIENT WAS SEEN AND EXAMINED BY ME WITH THE HOUSESTAFF AND STROKE TEAM DURING MORNING ROUNDS.   HPI: 56 y.o. R-H F with no PMH presented to [facility] as a transfer from [facility] for angiogram. Initially presented as a code stroke at [facility] on 10/13 with unknown LKN. Pt started to have disorientation for 3 days. Also endorsed headache that rated as severe as 10/10 at that time but improved over the 3 days. Unclear whether had full vision loss but complained of possible R peripheral vision problems. Also was experiencing room-spinning sensation that prevented her from walking at home. This vertigo was worsened by any eye movements. Currently has mild frontal headache that goes across the forehead with mild pressure behind the eyes. Headache is not pulsating or throbbing. Light worsens the headache but position whether laying or standing does not affect the intensity of the headache. Denies any lightheadedness or room-spinning sensation, floaters, black dots, curtains on the vision, n/v, abd pain, chest pain, pain/numbness/tingling in all extremities. Lives at home, does not need help for ADLs, walks independently at baseline, never smoker, no EtOH or illicit drug use.     At [facility], CTH demonstrated L pareital IPH measuring up to 3.7 cm and adjacent subarachnoid hemorrhage. CTA demonstrated no LVO. CTP was not performed. MR brain showed consistent L parietal parasagittal acute hematoma with surrounding edema in L PCA territory. Dr. De Paz and Dr. Gan saw the pt and recommended transfer for agiongram with Dr. Reagan.      SUBJECTIVE: C/o HA.       acetaminophen   Tablet .. 650 milliGRAM(s) Oral every 6 hours PRN  influenza   Vaccine 0.5 milliLiter(s) IntraMuscular once  levETIRAcetam 500 milliGRAM(s) Oral two times a day    PHYSICAL EXAM:   Vital Signs Last 24 Hrs  T(C): 36.8 (16 Oct 2021 04:00), Max: 37.1 (15 Oct 2021 14:00)  T(F): 98.2 (16 Oct 2021 04:00), Max: 98.7 (15 Oct 2021 14:00)  HR: 60 (16 Oct 2021 06:00) (59 - 66)  BP: 124/79 (16 Oct 2021 06:00) (100/75 - 142/85)  BP(mean): 94 (16 Oct 2021 06:00) (80 - 95)  RR: 18 (16 Oct 2021 06:00) (18 - 24)  SpO2: 100% (16 Oct 2021 06:00) (95% - 100%)    General: No acute distress  HEENT: EOM intact, visual fields full  Abdomen: Soft, nontender, nondistended   Extremities: No edema    NEUROLOGICAL EXAM:  Mental status: Awake, alert, oriented x3, no aphasia, no neglect, normal memory   Cranial Nerves: subtle l facial palsy, no nystagmus, no dysarthria,  tongue midline  Motor exam: Normal tone, no drift, 5/5 RUE, RLE - subtle drift, 5/5 LUE, 5/5 LLE, normal fine finger movements.  Sensation: Intact to light touch   Coordination/ Gait: No dysmetria, gait deferred       LABS:                        12.1   5.99  )-----------( 236      ( 16 Oct 2021 05:15 )             40.2    10-16    142  |  108  |  16  ----------------------------<  122<H>  4.1   |  20<L>  |  1.01    Ca    8.9      16 Oct 2021 05:15      IMAGING: Reviewed by me.     CTH No cont (10/16/21):  Compared to previous studies, stable intraparenchymal hemorrhage in the left parietal lobule with mild increase in ashley-hemorrhagic edema, consistent with expected evolution of hemorrhage.No new hemorrhage or extension of the hemorrhage in the ventricles.    CT CHEST IC, CT ABDOMEN AND PELVIS OC IC (10/16/21):  No evidence of suspicious mass or lymphadenopathy in the chest, abdomen, or pelvis.         (10/13/21) @[facility]):   CT brain: There is an intraparenchymal hemorrhage in the left parietal lobule with perihemorrhagic edema as described. There is no extension of the hemorrhage into the ventricles, midline shift or herniation. There is no acute lobar infarction.  CTA brain: There is no large vessel occlusion or aneurysm involving major proximal intracranial arteries. There is no dominant arteriovenous malformation.  CTA NECK: There is no stenosis involving major neck arteries.  CT venogram: There is no high-grade stenosis or occlusion within the dural venous sinuses.    MR Head w/wo IV Cont (10.14.21 @ [facility]):  Left parietal parasagittal acute hematoma with surrounding edema in the distribution of the left PCA territory. No abnormal enhancement in association. No vascular hypertrophy

## 2021-10-16 NOTE — PROGRESS NOTE ADULT - SUBJECTIVE AND OBJECTIVE BOX
Name of Patient : HARRIETT SHANNON  MRN: [id number]  Date of visit: 10-16-21 @ 19:07      Subjective: Patient seen and examined. No new events except as noted.   family at the bedside  no gross deficit  however patient states that has memory changes     REVIEW OF SYSTEMS:    CONSTITUTIONAL: No weakness, fevers or chills  EYES/ENT: No visual changes;  No vertigo or throat pain   NECK: No pain or stiffness  RESPIRATORY: No cough, wheezing, hemoptysis; No shortness of breath  CARDIOVASCULAR: No chest pain or palpitations  GASTROINTESTINAL: No abdominal or epigastric pain. No nausea, vomiting, or hematemesis; No diarrhea or constipation. No melena or hematochezia.  GENITOURINARY: No dysuria, frequency or hematuria  NEUROLOGICAL: No numbness or weakness  SKIN: No itching, burning, rashes, or lesions   All other review of systems is negative unless indicated above.    MEDICATIONS:  MEDICATIONS  (STANDING):  enoxaparin Injectable 40 milliGRAM(s) SubCutaneous daily  influenza   Vaccine 0.5 milliLiter(s) IntraMuscular once  levETIRAcetam 500 milliGRAM(s) Oral two times a day      PHYSICAL EXAM:  T(C): 36.8 (10-16-21 @ 14:00), Max: 37.1 (10-16-21 @ 08:07)  HR: 66 (10-16-21 @ 18:00) (47 - 74)  BP: 132/69 (10-16-21 @ 18:00) (100/75 - 135/82)  RR: 20 (10-16-21 @ 18:00) (14 - 23)  SpO2: 100% (10-16-21 @ 18:00) (95% - 100%)  Wt(kg): --  I&O's Summary    15 Oct 2021 07:01  -  16 Oct 2021 07:00  --------------------------------------------------------  IN: 820 mL / OUT: 0 mL / NET: 820 mL    16 Oct 2021 07:01  -  16 Oct 2021 19:07  --------------------------------------------------------  IN: 480 mL / OUT: 0 mL / NET: 480 mL          Appearance: Normal, obese  HEENT:  PERRLA   Lymphatic: No lymphadenopathy   Cardiovascular: Normal S1 S2, no JVD  Respiratory: normal effort , clear  Gastrointestinal:  Soft, Non-tender  Skin: No rashes,  warm to touch  Psychiatry:  Mood & affect appropriate  Musculuskeletal: No edema      All labs, Imaging and EKGs personally reviewed       10-15-21 @ 07:01  -  10-16-21 @ 07:00  --------------------------------------------------------  IN: 820 mL / OUT: 0 mL / NET: 820 mL    10-16-21 @ 07:01  -  10-16-21 @ 19:07  --------------------------------------------------------  IN: 480 mL / OUT: 0 mL / NET: 480 mL                            12.1   5.99  )-----------( 236      ( 16 Oct 2021 05:15 )             40.2               10-16    142  |  108  |  16  ----------------------------<  122<H>  4.1   |  20<L>  |  1.01    Ca    8.9      16 Oct 2021 05:15    < from: CT Abdomen and Pelvis w/ Oral Cont and w/ IV Cont (10.16.21 @ 13:09) >  FINDINGS:  CHEST:  LUNGS AND LARGE AIRWAYS: Patent central airways. No pulmonary nodules.  PLEURA: No pleural effusion.  VESSELS: Within normal limits.  HEART: Mildly enlarged heart. No pericardial effusion.  MEDIASTINUM AND ESTELLA: No lymphadenopathy.  CHEST WALL AND LOWER NECK: Within normal limits.    ABDOMEN AND PELVIS:  LIVER: Fatty infiltration of the liver. No suspicious liver lesions.  BILE DUCTS: Normal caliber.  GALLBLADDER: Within normal limits.  SPLEEN: Within normal limits.  PANCREAS: Within normal limits.  ADRENALS: Within normal limits.  KIDNEYS/URETERS: Subcentimeter left renal lesion too small to characterize, likely a cyst. Otherwise within normal limits..    BLADDER: Within normal limits.  REPRODUCTIVE ORGANS: Uterus and bilateral adnexa within normal limits.    BOWEL: No bowel obstruction. Appendix is normal. Diverticulosis.  PERITONEUM: No ascites.  VESSELS: Within normal limits.  RETROPERITONEUM/LYMPH NODES: Increase in number of small gastrohepatic lymph nodes, likely reactive.  ABDOMINAL WALL: Fat-containing umbilical hernia.  BONES: Degenerative changes.    IMPRESSION:  No evidence of suspicious mass or lymphadenopathy in the chest, abdomen, or pelvis.    < from: CT Head No Cont (10.16.21 @ 12:58) >    IMPRESSION:    Compared to previous studies,    Stable intraparenchymal hemorrhage in the left parietal lobule with mild increase in ashley-hemorrhagic edema, consistent with expected evolution of hemorrhage.    No new hemorrhage or extension of the hemorrhage in the ventricles.

## 2021-10-17 ENCOUNTER — TRANSCRIPTION ENCOUNTER (OUTPATIENT)
Age: 56
End: 2021-10-17

## 2021-10-17 PROCEDURE — 99233 SBSQ HOSP IP/OBS HIGH 50: CPT

## 2021-10-17 RX ORDER — SODIUM CHLORIDE 9 MG/ML
1000 INJECTION INTRAMUSCULAR; INTRAVENOUS; SUBCUTANEOUS
Refills: 0 | Status: DISCONTINUED | OUTPATIENT
Start: 2021-10-18 | End: 2021-10-18

## 2021-10-17 RX ORDER — OXYCODONE HYDROCHLORIDE 5 MG/1
5 TABLET ORAL ONCE
Refills: 0 | Status: DISCONTINUED | OUTPATIENT
Start: 2021-10-17 | End: 2021-10-17

## 2021-10-17 RX ORDER — ACETAMINOPHEN 500 MG
1000 TABLET ORAL ONCE
Refills: 0 | Status: COMPLETED | OUTPATIENT
Start: 2021-10-17 | End: 2021-10-17

## 2021-10-17 RX ORDER — OXYCODONE AND ACETAMINOPHEN 5; 325 MG/1; MG/1
1 TABLET ORAL ONCE
Refills: 0 | Status: DISCONTINUED | OUTPATIENT
Start: 2021-10-17 | End: 2021-10-17

## 2021-10-17 RX ADMIN — Medication 1000 MILLIGRAM(S): at 07:30

## 2021-10-17 RX ADMIN — ENOXAPARIN SODIUM 40 MILLIGRAM(S): 100 INJECTION SUBCUTANEOUS at 12:58

## 2021-10-17 RX ADMIN — Medication 400 MILLIGRAM(S): at 12:57

## 2021-10-17 RX ADMIN — LEVETIRACETAM 500 MILLIGRAM(S): 250 TABLET, FILM COATED ORAL at 05:47

## 2021-10-17 RX ADMIN — LEVETIRACETAM 500 MILLIGRAM(S): 250 TABLET, FILM COATED ORAL at 17:19

## 2021-10-17 RX ADMIN — Medication 1000 MILLIGRAM(S): at 13:30

## 2021-10-17 RX ADMIN — Medication 400 MILLIGRAM(S): at 06:53

## 2021-10-17 RX ADMIN — OXYCODONE HYDROCHLORIDE 5 MILLIGRAM(S): 5 TABLET ORAL at 14:12

## 2021-10-17 RX ADMIN — OXYCODONE HYDROCHLORIDE 5 MILLIGRAM(S): 5 TABLET ORAL at 21:28

## 2021-10-17 NOTE — PROGRESS NOTE ADULT - SUBJECTIVE AND OBJECTIVE BOX
Name of Patient : HARRIETT SHANNON  MRN: [id number]  Date of visit: 10-17-21       Subjective: Patient seen and examined. No new events except as noted.   doing okay     REVIEW OF SYSTEMS:    CONSTITUTIONAL: + headache   EYES/ENT: No visual changes;  No vertigo or throat pain   NECK: No pain or stiffness  RESPIRATORY: No cough, wheezing, hemoptysis; No shortness of breath  CARDIOVASCULAR: No chest pain or palpitations  GASTROINTESTINAL: No abdominal or epigastric pain. No nausea, vomiting, or hematemesis; No diarrhea or constipation. No melena or hematochezia.  GENITOURINARY: No dysuria, frequency or hematuria  NEUROLOGICAL: No numbness or weakness  SKIN: No itching, burning, rashes, or lesions   All other review of systems is negative unless indicated above.    MEDICATIONS:  MEDICATIONS  (STANDING):  enoxaparin Injectable 40 milliGRAM(s) SubCutaneous daily  influenza   Vaccine 0.5 milliLiter(s) IntraMuscular once  levETIRAcetam 500 milliGRAM(s) Oral two times a day      PHYSICAL EXAM:  T(C): 36.9 (10-17-21 @ 21:04), Max: 36.9 (10-17-21 @ 21:04)  HR: 87 (10-17-21 @ 21:04) (47 - 87)  BP: 128/78 (10-17-21 @ 21:04) (114/73 - 150/82)  RR: 18 (10-17-21 @ 21:04) (18 - 18)  SpO2: 97% (10-17-21 @ 21:04) (97% - 100%)  Wt(kg): --  I&O's Summary    16 Oct 2021 07:01  -  17 Oct 2021 07:00  --------------------------------------------------------  IN: 480 mL / OUT: 0 mL / NET: 480 mL    17 Oct 2021 07:01  -  17 Oct 2021 22:30  --------------------------------------------------------  IN: 480 mL / OUT: 0 mL / NET: 480 mL          Appearance: Normal	  HEENT:  PERRLA   Lymphatic: No lymphadenopathy   Cardiovascular: Normal S1 S2, no JVD  Respiratory: normal effort , clear  Gastrointestinal:  Soft, Non-tender  Skin: No rashes,  warm to touch  Psychiatry:  Mood & affect appropriate  Musculuskeletal: No edema      All labs, Imaging and EKGs personally reviewed       10-16-21 @ 07:01  -  10-17-21 @ 07:00  --------------------------------------------------------  IN: 480 mL / OUT: 0 mL / NET: 480 mL    10-17-21 @ 07:01  -  10-17-21 @ 22:30  --------------------------------------------------------  IN: 480 mL / OUT: 0 mL / NET: 480 mL                          12.1   5.99  )-----------( 236      ( 16 Oct 2021 05:15 )             40.2               10-16    142  |  108  |  16  ----------------------------<  122<H>  4.1   |  20<L>  |  1.01    Ca    8.9      16 Oct 2021 05:15

## 2021-10-17 NOTE — PROGRESS NOTE ADULT - SUBJECTIVE AND OBJECTIVE BOX
THE PATIENT WAS SEEN AND EXAMINED BY ME WITH THE HOUSESTAFF AND STROKE TEAM DURING MORNING ROUNDS.   HPI: 56 y.o. R-H F with no PMH presented to [facility] as a transfer from [facility] for angiogram. Initially presented as a code stroke at [facility] on 10/13 with unknown LKN. Pt started to have disorientation for 3 days. Also endorsed headache that rated as severe as 10/10 at that time but improved over the 3 days. Unclear whether had full vision loss but complained of possible R peripheral vision problems. Also was experiencing room-spinning sensation that prevented her from walking at home. This vertigo was worsened by any eye movements. Currently has mild frontal headache that goes across the forehead with mild pressure behind the eyes. Headache is not pulsating or throbbing. Light worsens the headache but position whether laying or standing does not affect the intensity of the headache. Denies any lightheadedness or room-spinning sensation, floaters, black dots, curtains on the vision, n/v, abd pain, chest pain, pain/numbness/tingling in all extremities. Lives at home, does not need help for ADLs, walks independently at baseline, never smoker, no EtOH or illicit drug use.     At [facility], CTH demonstrated L pareital IPH measuring up to 3.7 cm and adjacent subarachnoid hemorrhage. CTA demonstrated no LVO. CTP was not performed. MR brain showed consistent L parietal parasagittal acute hematoma with surrounding edema in L PCA territory. Dr. De Paz and Dr. Gan saw the pt and recommended transfer for agiongram with Dr. Reagan.      SUBJECTIVE: C/o HA.       enoxaparin Injectable 40 milliGRAM(s) SubCutaneous daily  influenza   Vaccine 0.5 milliLiter(s) IntraMuscular once  levETIRAcetam 500 milliGRAM(s) Oral two times a day    PHYSICAL EXAM:   Vital Signs Last 24 Hrs  T(C): 36.4 (17 Oct 2021 05:18), Max: 37.1 (16 Oct 2021 08:07)  T(F): 97.5 (17 Oct 2021 05:18), Max: 98.7 (16 Oct 2021 08:07)  HR: 60 (17 Oct 2021 05:18) (47 - 75)  BP: 124/63 (17 Oct 2021 05:18) (109/71 - 132/69)  BP(mean): 88 (16 Oct 2021 18:00) (81 - 99)  RR: 18 (17 Oct 2021 05:18) (14 - 21)  SpO2: 97% (17 Oct 2021 05:18) (94% - 100%)    General: No acute distress  HEENT: EOM intact, visual fields full  Abdomen: Soft, nontender, nondistended   Extremities: No edema    NEUROLOGICAL EXAM:  Mental status: Awake, alert, oriented x3, no aphasia, no neglect, normal memory   Cranial Nerves: subtle l facial palsy, no nystagmus, no dysarthria,  tongue midline  Motor exam: Normal tone, no drift, 5/5 RUE, RLE - subtle drift, 5/5 LUE, 5/5 LLE, normal fine finger movements.  Sensation: Intact to light touch   Coordination/ Gait: No dysmetria, gait deferred       LABS:                        12.1   5.99  )-----------( 236      ( 16 Oct 2021 05:15 )             40.2    10-16    142  |  108  |  16  ----------------------------<  122<H>  4.1   |  20<L>  |  1.01    Ca    8.9      16 Oct 2021 05:15      IMAGING: Reviewed by me.     CTH No cont (10/16/21):  Compared to previous studies, stable intraparenchymal hemorrhage in the left parietal lobule with mild increase in ashley-hemorrhagic edema, consistent with expected evolution of hemorrhage.No new hemorrhage or extension of the hemorrhage in the ventricles.    CT CHEST IC, CT ABDOMEN AND PELVIS OC IC (10/16/21):  No evidence of suspicious mass or lymphadenopathy in the chest, abdomen, or pelvis.         (10/13/21) @[facility]):   CT brain: There is an intraparenchymal hemorrhage in the left parietal lobule with perihemorrhagic edema as described. There is no extension of the hemorrhage into the ventricles, midline shift or herniation. There is no acute lobar infarction.  CTA brain: There is no large vessel occlusion or aneurysm involving major proximal intracranial arteries. There is no dominant arteriovenous malformation.  CTA NECK: There is no stenosis involving major neck arteries.  CT venogram: There is no high-grade stenosis or occlusion within the dural venous sinuses.    MR Head w/wo IV Cont (10.14.21 @ [facility]):  Left parietal parasagittal acute hematoma with surrounding edema in the distribution of the left PCA territory. No abnormal enhancement in association. No vascular hypertrophy

## 2021-10-17 NOTE — PROGRESS NOTE ADULT - ASSESSMENT
Patient is a 56 y.o. F with no PMH presented to [facility] as a transfer from [facility] for angiogram. Initially presented as a code stroke at [facility] on 10/13 with unknown LKN. Pt started to have disorientation for 3 days. Also endorsed headache that rated as severe as 10/10 at that time but improved over the 3 days. Unclear whether had full vision loss but complained of possible R peripheral vision problems. Also was experiencing room-spinning sensation that prevented her from walking at home. This vertigo was worsened by any eye movements. Currently has mild frontal headache that goes across the forehead with mild pressure behind the eyes. Headache is not pulsating or throbbing. Light worsens the headache but position whether laying or standing does not affect the intensity of the headache. Denies any lightheadedness or room-spinning sensation, floaters, black dots, curtains on the vision, n/v, abd pain, chest pain, pain/numbness/tingling in all extremities. Lives at home, does not need help for ADLs, walks independently at baseline, never smoker, no EtOH or illicit drug use.  At [facility], CTH demonstrated L pareital IPH measuring up to 3.7 cm and adjacent subarachnoid hemorrhage. CTA demonstrated no LVO. CTP was not performed. MR brain showed consistent L parietal parasagittal acute hematoma with surrounding edema in L PCA territory. Dr. De Paz and Dr. Gan saw the pt and recommended transfer for agiongram with Dr. Reagan.     Impression: Subacute vertigo, gait instability, and b/l frontal headache likely due to L parietal parasagittal acute hematoma. Localization is L PCA territory. Etiology for hematoma include AVM vs HTN vs trauma vs r/o neoplastic     Recommendations:  [] If sudden mental status changes, would repeat CTH , noted repeat CT no acute changes   [] DVT PPX   [] Would hold statin for now  [] Symptomatic tx for headache with IV tylenol  [] TTE  [] +/- ILR depending on TTE  [] MRI brain noted   [] Card eval for BP control  [] neurology follow up, plan for Cerebral angio on monday  [] card preop called , appreciated recs  [] monitor vitals, check for elevated BP    [] check echo for structurral heart disease   [] Pan CT noted no malignancy, CT head noted       Labs  [] CBC, BMP, and lipid profile noted     Other  [] Telemonitoring;  Neurochecks and Vital signs per unit protocol  [] Permissive HTN up to SBP < 160 mmHg  [] BGM goals 140-180  [] PT/OT evaluation  [] resume diet, passed speech and swallow   [] Stroke education provided

## 2021-10-17 NOTE — PROGRESS NOTE ADULT - ASSESSMENT
ASSESSMENT: 56 y.o. R-H F with no PMH presented to [facility] as a transfer from [facility] for angiogram. Initially presented as a code stroke at [facility] on 10/13 with unknown LKN. Pt started to have disorientation for 3 days. Also endorsed headache that rated as severe as 10/10 at that time but improved over the 3 days. Unclear whether had full vision loss but complained of possible R peripheral vision problems. Also was experiencing room-spinning sensation that prevented her from walking at home. This vertigo was worsened by any eye movements. Currently has mild frontal headache that goes across the forehead with mild pressure behind the eyes. Headache is not pulsating or throbbing. Light worsens the headache but position whether laying or standing does not affect the intensity of the headache. Denies any lightheadedness or room-spinning sensation, floaters, black dots, curtains on the vision, n/v, abd pain, chest pain, pain/numbness/tingling in all extremities. Lives at home, does not need help for ADLs, walks independently at baseline, never smoker, no EtOH or illicit drug use.   At [facility], CTH demonstrated L pareital IPH measuring up to 3.7 cm and adjacent subarachnoid hemorrhage. CTA demonstrated no LVO. CTP was not performed. MR brain showed consistent L parietal parasagittal acute hematoma with surrounding edema in L PCA territory. Dr. De Paz and Dr. Gan saw the pt and recommended transfer for agiongram with Dr. Reagan.      Impression: beginning on or about 10/12/2021 she developed severe headache.  Denied cognitive changes, visual disturbance or vertigo.  CT head showed a small left parasagittal, either parietal or perhaps perirolandic lobar hemorrhage.  The etiology of the hemorrhage is uncertain, but a small vascular malformation, including a micro-AVM should be screened for.     NEURO: Neurologically stable since admission. Continue  monitoring for neurologic deterioration, BP <160,  titrate statin to LDL goal less than 70, MRI Brain w/o, MRA Head w/o and Neck w/contrast, results as noted above.  Repeat CTH on 10/16 started on lovenox for DVT ppx. CT c/a/p negative for malignancy.  Physical therapy/OT recommend outpatient PT.     ANTITHROMBOTIC THERAPY: None in the setting of hemorrhage.    PULMONARY:  protecting airway, saturating well     CARDIOVASCULAR: TTE: EF 70%, 1. Normal left ventricular systolic function. No segmental wall motion abnormalities 2. Normal right ventricular size and function.3. IVC small with > 50% collapse. Estimated right atrial pressure is low suggestive of volume depletion. 4. Estimated pulmonary artery systolic pressure equals 16 mm Hg, assuming right atrial pressure equals 3  mm Hg, consistent with normal pulmonary pressures, will continue cardiac monitoring- without any recorded events.                              SBP goal: <160/90     GASTROINTESTINAL:  dysphagia screen passed, tolerating diet     Diet: Regular     RENAL: BUN/Cr within normal limits, good urine output      Na Goal: Greater than 135     Araujo: No    HEMATOLOGY: H/H within normal limits, Platelets 236      DVT ppx: lovenox sq    ID: afebrile, no leukocytosis, no signs or symptoms of infection    OTHER: All questions and concerns addressed with patient at bedside.     DISPOSITION: Home with home PT once stable and workup is complete.    CORE MEASURES:        Admission NIHSS:      TPA: [] YES [x] NO      LDL/HDL: 85/107     Depression Screen: p     Statin Therapy: n     Dysphagia Screen: [x] PASS [] FAIL     Smoking [] YES [x] NO      Afib [] YES [x] NO     Stroke Education [x] YES [] NO    Obtain screening lower extremity venous ultrasound in patients who meet 1 or more of the following criteria as patient is high risk for DVT/PE on admission:   [] History of DVT/PE  []Hypercoagulable states (Factor V Leiden, Cancer, OCP, etc. )  []Prolonged immobility (hemiplegia/hemiparesis/post operative or any other extended immobilization)  [] Transferred from outside facility (Rehab or Long term care)  [] Age </= to 50.   ASSESSMENT: 56 y.o. R-H F with no PMH presented to [facility] as a transfer from [facility] for angiogram. Initially presented as a code stroke at [facility] on 10/13 with unknown LKN. Pt started to have disorientation for 3 days. Also endorsed headache that rated as severe as 10/10 at that time but improved over the 3 days. Unclear whether had full vision loss but complained of possible R peripheral vision problems. Also was experiencing room-spinning sensation that prevented her from walking at home. This vertigo was worsened by any eye movements. Currently has mild frontal headache that goes across the forehead with mild pressure behind the eyes. Headache is not pulsating or throbbing. Light worsens the headache but position whether laying or standing does not affect the intensity of the headache. Denies any lightheadedness or room-spinning sensation, floaters, black dots, curtains on the vision, n/v, abd pain, chest pain, pain/numbness/tingling in all extremities. Lives at home, does not need help for ADLs, walks independently at baseline, never smoker, no EtOH or illicit drug use.   At [facility], CTH demonstrated L pareital IPH measuring up to 3.7 cm and adjacent subarachnoid hemorrhage. CTA demonstrated no LVO. CTP was not performed. MR brain showed consistent L parietal parasagittal acute hematoma with surrounding edema in L PCA territory. Dr. De Paz and Dr. Gan saw the pt and recommended transfer for agiongram with Dr. Reagan.      Impression: beginning on or about 10/12/2021 she developed severe headache.  Denied cognitive changes, visual disturbance or vertigo.  CT head showed a small left parasagittal, either parietal or perhaps perirolandic lobar hemorrhage.  The etiology of the hemorrhage is uncertain, but a small vascular malformation, including a micro-AVM should be screened for.     NEURO: Neurologically stable since admission. Continue  monitoring for neurologic deterioration, BP <160,  titrate statin to LDL goal less than 70, MRI Brain w/o, MRA Head w/o and Neck w/contrast, results as noted above.  Repeat CTH on 10/16 started on lovenox for DVT ppx. CT c/a/p negative for malignancy.  Physical therapy/OT recommend outpatient PT.  Continue with pain control for HA.     ANTITHROMBOTIC THERAPY: None in the setting of hemorrhage.    PULMONARY:  protecting airway, saturating well     CARDIOVASCULAR: TTE: EF 70%, 1. Normal left ventricular systolic function. No segmental wall motion abnormalities 2. Normal right ventricular size and function.3. IVC small with > 50% collapse. Estimated right atrial pressure is low suggestive of volume depletion. 4. Estimated pulmonary artery systolic pressure equals 16 mm Hg, assuming right atrial pressure equals 3  mm Hg, consistent with normal pulmonary pressures, will continue cardiac monitoring- without any recorded events.                              SBP goal: <160/90     GASTROINTESTINAL:  dysphagia screen passed, tolerating diet     Diet: Regular     RENAL: BUN/Cr within normal limits, good urine output      Na Goal: Greater than 135     Araujo: No    HEMATOLOGY: H/H within normal limits, Platelets 236      DVT ppx: lovenox sq    ID: afebrile, no leukocytosis, no signs or symptoms of infection    OTHER: All questions and concerns addressed with patient at bedside.     DISPOSITION: Home with home PT once stable and workup is complete.    CORE MEASURES:        Admission NIHSS:      TPA: [] YES [x] NO      LDL/HDL: 85/107     Depression Screen: p     Statin Therapy: n     Dysphagia Screen: [x] PASS [] FAIL     Smoking [] YES [x] NO      Afib [] YES [x] NO     Stroke Education [x] YES [] NO    Obtain screening lower extremity venous ultrasound in patients who meet 1 or more of the following criteria as patient is high risk for DVT/PE on admission:   [] History of DVT/PE  []Hypercoagulable states (Factor V Leiden, Cancer, OCP, etc. )  []Prolonged immobility (hemiplegia/hemiparesis/post operative or any other extended immobilization)  [] Transferred from outside facility (Rehab or Long term care)  [] Age </= to 50.

## 2021-10-17 NOTE — PROGRESS NOTE ADULT - SUBJECTIVE AND OBJECTIVE BOX
Date of Service   10-17-21 @ 11:19    Patient is a 56y old  Female who presents with a chief complaint of Headache (15 Oct 2021 14:18)      INTERVAL HISTORY:     TELEMETRY Personally reviewed:    REVIEW OF SYSTEMS:   CONSTITUTIONAL: No weakness  EYES/ENT: No visual changes; No throat pain  Neck: No pain or stiffness  Respiratory: No cough, wheezing, No shortness of breath  CARDIOVASCULAR: no chest pain or palpitations  GASTROINTESTINAL: No abdominal pain, no nausea, vomiting or hematemesis  GENITOURINARY: No dysuria, frequency or hematuria  NEUROLOGICAL: No stroke like symptoms  SKIN: No rashes    	  MEDICATIONS:        PHYSICAL EXAM:  T(C): 36.6 (10-17-21 @ 08:14), Max: 36.8 (10-16-21 @ 14:00)  HR: 56 (10-17-21 @ 08:14) (47 - 75)  BP: 145/77 (10-17-21 @ 08:14) (112/76 - 145/77)  RR: 18 (10-17-21 @ 08:14) (14 - 20)  SpO2: 97% (10-17-21 @ 08:14) (94% - 100%)  Wt(kg): --  I&O's Summary    16 Oct 2021 07:01  -  17 Oct 2021 07:00  --------------------------------------------------------  IN: 480 mL / OUT: 0 mL / NET: 480 mL    17 Oct 2021 07:01  -  17 Oct 2021 11:19  --------------------------------------------------------  IN: 240 mL / OUT: 0 mL / NET: 240 mL          Appearance: In no distress	  HEENT:    PERRL, EOMI	  Cardiovascular:  S1 S2, No JVD  Respiratory: Lungs clear to auscultation	  Gastrointestinal:  Soft, Non-tender, + BS	  Vascularature:  No edema of LE  Psychiatric: Appropriate affect   Neuro: no acute focal deficits                               12.1   5.99  )-----------( 236      ( 16 Oct 2021 05:15 )             40.2     10-16    142  |  108  |  16  ----------------------------<  122<H>  4.1   |  20<L>  |  1.01    Ca    8.9      16 Oct 2021 05:15          Labs personally reviewed    ASSESSMENT/PLAN: 	    56 y.o. F with no significant PMH (although does not follow with doctors) who presents as a transfer from [facility] for angiogram with dizziness, weakness, and disorientation x 2 days. She is also experiencing a HA that began 5 days ago. She currently denies any weaknesses, dizziness, palpitation or blurry vision. She is still experiencing HA 2/10. pt denies any cardiac history. Reports she gets her annual checkups and has been fine. Denies any smoking history, father has HTN. pt is obese and gets ESTEBAN at baseline, she is unable to walk 2-3 blocks 2/2 ESTEBAN. She reports occasional swelling of the legs and unable to lay flat when she sleeps 2/2 orthopnea.       Problem/Plan - 1:  ·  Problem: Intraparenchymal hemorrhage.   - serial CT head reveals intraparenchyma hemorrhage   - no evidence of cerebral venous thrombosis on CTV  - BP goal <160/90. Hydralazine 5 mg PRN q6h ordered  - monitor BP   - passed dysphagia screen, diet initiated  - Keppra 500 BID for seizure ppx  - neurology following.    Problem/Plan - 2:  ·  Problem: ESTEBAN  - pt is morbidly obese woman at high risk factor for cardiac disease   - monitor BP   - ECHO normal   - Lipids panel normal LDL goal less than 70 per neuro         Lillian Up FN-BC   Oliverio Nelson DO [facility]  Cardiovascular Medicine  [address], [address]  Office: [phone]  Cell: [phone]

## 2021-10-17 NOTE — PROGRESS NOTE ADULT - TIME BILLING
Diagnosis and treatment plan; counselling for secondary stroke prevention  Agree with above; ROS otherwise negative
Diagnosis and treatment plan; counselling for secondary stroke prevention  Agree with above; ROS otherwise negative

## 2021-10-18 ENCOUNTER — TRANSCRIPTION ENCOUNTER (OUTPATIENT)
Age: 56
End: 2021-10-18

## 2021-10-18 VITALS
HEART RATE: 59 BPM | OXYGEN SATURATION: 97 % | RESPIRATION RATE: 14 BRPM | SYSTOLIC BLOOD PRESSURE: 137 MMHG | DIASTOLIC BLOOD PRESSURE: 76 MMHG

## 2021-10-18 LAB
ANION GAP SERPL CALC-SCNC: 13 MMOL/L — SIGNIFICANT CHANGE UP (ref 5–17)
APTT BLD: 38.2 SEC — HIGH (ref 27.5–35.5)
BLD GP AB SCN SERPL QL: NEGATIVE — SIGNIFICANT CHANGE UP
BUN SERPL-MCNC: 12 MG/DL — SIGNIFICANT CHANGE UP (ref 7–23)
CALCIUM SERPL-MCNC: 9.1 MG/DL — SIGNIFICANT CHANGE UP (ref 8.4–10.5)
CHLORIDE SERPL-SCNC: 106 MMOL/L — SIGNIFICANT CHANGE UP (ref 96–108)
CO2 SERPL-SCNC: 21 MMOL/L — LOW (ref 22–31)
CREAT SERPL-MCNC: 0.9 MG/DL — SIGNIFICANT CHANGE UP (ref 0.5–1.3)
GLUCOSE SERPL-MCNC: 74 MG/DL — SIGNIFICANT CHANGE UP (ref 70–99)
HCT VFR BLD CALC: 43.5 % — SIGNIFICANT CHANGE UP (ref 34.5–45)
HGB BLD-MCNC: 12.9 G/DL — SIGNIFICANT CHANGE UP (ref 11.5–15.5)
INR BLD: 1.06 RATIO — SIGNIFICANT CHANGE UP (ref 0.88–1.16)
MCHC RBC-ENTMCNC: 25.3 PG — LOW (ref 27–34)
MCHC RBC-ENTMCNC: 29.7 GM/DL — LOW (ref 32–36)
MCV RBC AUTO: 85.3 FL — SIGNIFICANT CHANGE UP (ref 80–100)
NRBC # BLD: 0 /100 WBCS — SIGNIFICANT CHANGE UP (ref 0–0)
PLATELET # BLD AUTO: 230 K/UL — SIGNIFICANT CHANGE UP (ref 150–400)
POTASSIUM SERPL-MCNC: 4 MMOL/L — SIGNIFICANT CHANGE UP (ref 3.5–5.3)
POTASSIUM SERPL-SCNC: 4 MMOL/L — SIGNIFICANT CHANGE UP (ref 3.5–5.3)
PROTHROM AB SERPL-ACNC: 12.7 SEC — SIGNIFICANT CHANGE UP (ref 10.6–13.6)
RBC # BLD: 5.1 M/UL — SIGNIFICANT CHANGE UP (ref 3.8–5.2)
RBC # FLD: 15.5 % — HIGH (ref 10.3–14.5)
RH IG SCN BLD-IMP: POSITIVE — SIGNIFICANT CHANGE UP
SODIUM SERPL-SCNC: 140 MMOL/L — SIGNIFICANT CHANGE UP (ref 135–145)
WBC # BLD: 6.22 K/UL — SIGNIFICANT CHANGE UP (ref 3.8–10.5)
WBC # FLD AUTO: 6.22 K/UL — SIGNIFICANT CHANGE UP (ref 3.8–10.5)

## 2021-10-18 PROCEDURE — 36226 PLACE CATH VERTEBRAL ART: CPT | Mod: 50

## 2021-10-18 PROCEDURE — 85027 COMPLETE CBC AUTOMATED: CPT

## 2021-10-18 PROCEDURE — 97165 OT EVAL LOW COMPLEX 30 MIN: CPT

## 2021-10-18 PROCEDURE — 80061 LIPID PANEL: CPT

## 2021-10-18 PROCEDURE — 36226 PLACE CATH VERTEBRAL ART: CPT

## 2021-10-18 PROCEDURE — 97116 GAIT TRAINING THERAPY: CPT

## 2021-10-18 PROCEDURE — C1894: CPT

## 2021-10-18 PROCEDURE — 86850 RBC ANTIBODY SCREEN: CPT

## 2021-10-18 PROCEDURE — 97110 THERAPEUTIC EXERCISES: CPT

## 2021-10-18 PROCEDURE — 36415 COLL VENOUS BLD VENIPUNCTURE: CPT

## 2021-10-18 PROCEDURE — 36227 PLACE CATH XTRNL CAROTID: CPT

## 2021-10-18 PROCEDURE — 93306 TTE W/DOPPLER COMPLETE: CPT

## 2021-10-18 PROCEDURE — 74177 CT ABD & PELVIS W/CONTRAST: CPT

## 2021-10-18 PROCEDURE — 36224 PLACE CATH CAROTD ART: CPT

## 2021-10-18 PROCEDURE — 85730 THROMBOPLASTIN TIME PARTIAL: CPT

## 2021-10-18 PROCEDURE — C1887: CPT

## 2021-10-18 PROCEDURE — 97129 THER IVNTJ 1ST 15 MIN: CPT

## 2021-10-18 PROCEDURE — 70450 CT HEAD/BRAIN W/O DYE: CPT

## 2021-10-18 PROCEDURE — 97130 THER IVNTJ EA ADDL 15 MIN: CPT

## 2021-10-18 PROCEDURE — 36224 PLACE CATH CAROTD ART: CPT | Mod: 50

## 2021-10-18 PROCEDURE — 84702 CHORIONIC GONADOTROPIN TEST: CPT

## 2021-10-18 PROCEDURE — 85610 PROTHROMBIN TIME: CPT

## 2021-10-18 PROCEDURE — 80048 BASIC METABOLIC PNL TOTAL CA: CPT

## 2021-10-18 PROCEDURE — 97161 PT EVAL LOW COMPLEX 20 MIN: CPT

## 2021-10-18 PROCEDURE — 86900 BLOOD TYPING SEROLOGIC ABO: CPT

## 2021-10-18 PROCEDURE — 86901 BLOOD TYPING SEROLOGIC RH(D): CPT

## 2021-10-18 PROCEDURE — C1769: CPT

## 2021-10-18 PROCEDURE — 71260 CT THORAX DX C+: CPT

## 2021-10-18 RX ORDER — TRAMADOL HYDROCHLORIDE 50 MG/1
25 TABLET ORAL ONCE
Refills: 0 | Status: DISCONTINUED | OUTPATIENT
Start: 2021-10-18 | End: 2021-10-18

## 2021-10-18 RX ORDER — ACETAMINOPHEN 500 MG
1000 TABLET ORAL ONCE
Refills: 0 | Status: COMPLETED | OUTPATIENT
Start: 2021-10-18 | End: 2021-10-18

## 2021-10-18 RX ORDER — TRAMADOL HYDROCHLORIDE 50 MG/1
0.5 TABLET ORAL
Qty: 6 | Refills: 0
Start: 2021-10-18 | End: 2021-10-21

## 2021-10-18 RX ADMIN — Medication 400 MILLIGRAM(S): at 06:23

## 2021-10-18 RX ADMIN — LEVETIRACETAM 500 MILLIGRAM(S): 250 TABLET, FILM COATED ORAL at 06:04

## 2021-10-18 NOTE — DISCHARGE NOTE PROVIDER - PROVIDER TOKENS
PROVIDER:[TOKEN:[[id number]],FOLLOWUP:[2 weeks]],PROVIDER:[TOKEN:[[id number]],FOLLOWUP:[2 weeks]] PROVIDER:[TOKEN:[[id number]],FOLLOWUP:[2 weeks]],PROVIDER:[TOKEN:[[id number]],FOLLOWUP:[2 weeks]],PROVIDER:[TOKEN:[[id number]],FOLLOWUP:[2 weeks]] PROVIDER:[TOKEN:[[id number]],FOLLOWUP:[2 weeks]],PROVIDER:[TOKEN:[[id number]],FOLLOWUP:[2 weeks]],PROVIDER:[TOKEN:[[id number]],FOLLOWUP:[2 weeks]],PROVIDER:[TOKEN:[[id number]],FOLLOWUP:[1 week]]

## 2021-10-18 NOTE — PROGRESS NOTE ADULT - ATTENDING COMMENTS
I had a prolonged conversation with the patient regarding hospital course, differential diagnosis and results of diagnostic tests.  Plan of care discussed with patient after the evaluation. Patient expresses clear understanding and satisfaction with the plan of care. OMT on six regions for acute somatic dysfunctions done at the bedside. Sixty five minutes spent on encounter, of which more than fifty percent of the encounter was spent on counseling and/or coordinating care by the attending physician.
Pt care and plan discussed and reviewed with NP. Plan as outlined above edited by me to reflect our discussion.
Alex Gan MD  Vascular Neurology

## 2021-10-18 NOTE — DISCHARGE NOTE PROVIDER - CARE PROVIDERS DIRECT ADDRESSES
,[email or web address],DirectAddress_Unknown,DirectAddress_Unknown ,[email or web address],DirectAddress_Unknown,DirectAddress_Unknown,DirectAddress_Unknown

## 2021-10-18 NOTE — DISCHARGE NOTE PROVIDER - CARE PROVIDER_API CALL
Geraldo Reagan (MD)  Neurology; Vascular Neurology  [address], [address] [place] [address]  Phone: [phone]  Fax: [phone]  Follow Up Time: 2 weeks    Oliverio Nelson (DO)  Cardiovascular Disease; Internal Medicine; Nuclear Cardiology  [address], [address] [place] [address]  Phone: [phone]  Fax: [phone]  Follow Up Time: 2 weeks   Geraldo Reagan (MD)  Neurology; Vascular Neurology  [address], [address] [place] [address]  Phone: [phone]  Fax: [phone]  Follow Up Time: 2 weeks    Oliverio Nelson (DO)  Cardiovascular Disease; Internal Medicine; Nuclear Cardiology  [address], [address]  [place] [address]  Phone: [phone]  Fax: [phone]  Follow Up Time: 2 weeks    Francisco Shane (DO)  [facility]  [address] [place] [address] [address]  [place] [address]  Phone: [phone]  Fax: [phone]  Follow Up Time: 2 weeks   Geraldo Reagan (MD)  Neurology; Vascular Neurology  [address], [address] [place] [address]  Phone: [phone]  Fax: [phone]  Follow Up Time: 2 weeks    Oliverio Nelson (DO)  Cardiovascular Disease; Internal Medicine; Nuclear Cardiology  [address], [address]  [place] [address]  Phone: [phone]  Fax: [phone]  Follow Up Time: 2 weeks    Francisco Shane (DO)  Medicine  [address] [facility] [facility] [address]  [place] [address]  Phone: [phone]  Fax: [phone]  Follow Up Time: 2 weeks    Alex Gan)  Neurology; Vascular Neurology  [address] [facility], [address]  [place] [address]  Phone: [phone]  Fax: [phone]  Follow Up Time: 1 week

## 2021-10-18 NOTE — DISCHARGE NOTE PROVIDER - NSDCCPCAREPLAN_GEN_ALL_CORE_FT
PRINCIPAL DISCHARGE DIAGNOSIS  Diagnosis: Lobar cerebral hemorrhage  Assessment and Plan of Treatment: Impression: Beginning on or about 10/12/2021 she developed severe headache.  Denied cognitive changes, visual disturbance or vertigo.  CT head showed a small left parasagittal, either parietal or perhaps perirolandic lobar hemorrhage.  The etiology of the hemorrhage is uncertain, but a small vascular malformation, including a micro-AVM should be screened for.   Plan:  Take all medications as prescribed  Repeat MRI brain w/ and w/o contrast in 4-6 weeks  Outpatient cardiology follow up  Please follow up with neurologist in 1 week. The office will call you to schedule an appointment, if you do not hear from them please call [phone]. Continue taking medications as prescribed. If you were prescribed a statin for your cholesterol please make sure you have your liver enzymes checked with your primary care physician. Monitor your blood pressure. Reduce fat, cholesterol and salt in your diet. Increase intake of fruits and vegetables. Limit alcohol to minimum and do not smoke. You may be at risk for falling, make changes to your home to help you walk easier. Keep up to date on vaccinations.  If you experience any symptoms of facial drooping, slurred speech, arm or leg weakness, severe headache, vision changes or any worsening symptoms, notify provider immediately and return to ER.       PRINCIPAL DISCHARGE DIAGNOSIS  Diagnosis: Lobar cerebral hemorrhage  Assessment and Plan of Treatment: Impression: Beginning on or about 10/12/2021 she developed severe headache.  Denied cognitive changes, visual disturbance or vertigo.  CT head showed a small left parasagittal, either parietal or perhaps perirolandic lobar hemorrhage.  The etiology of the hemorrhage is uncertain, but a small vascular malformation, including a micro-AVM should be screened for.   Plan:  Take all medications as prescribed  Repeat MRI brain w/ and w/o contrast in 4-6 weeks  Outpatient cardiology follow up  Please follow up with neurologist in 1 week. The office will call you to schedule an appointment, if you do not hear from them please call [phone]. Continue taking medications as prescribed. If you were prescribed a statin for your cholesterol please make sure you have your liver enzymes checked with your primary care physician. Monitor your blood pressure. Reduce fat, cholesterol and salt in your diet. Increase intake of fruits and vegetables. Limit alcohol to minimum and do not smoke. You may be at risk for falling, make changes to your home to help you walk easier. Keep up to date on vaccinations.  If you experience any symptoms of facial drooping, slurred speech, arm or leg weakness, severe headache, vision changes or any worsening symptoms, notify provider immediately and return to ER.

## 2021-10-18 NOTE — DISCHARGE NOTE NURSING/CASE MANAGEMENT/SOCIAL WORK - PATIENT PORTAL LINK FT
You can access the FollowMyHealth Patient Portal offered by [facility] by registering at the following website: [email or web address]. By joining [facility]’s FollowMyHealth portal, you will also be able to view your health information using other applications (apps) compatible with our system.

## 2021-10-18 NOTE — DISCHARGE NOTE PROVIDER - NSDCMRMEDTOKEN_GEN_ALL_CORE_FT
hydrALAZINE 20 mg/mL injectable solution: 5 milligram(s) intravenous every 6 hours, As Needed for SBP &gt;160 or DBP &gt;90  levETIRAcetam 100 mg/mL intravenous solution: 5 milliliter(s) intravenous every 12 hours for 7 days   Rolling Walker: Dx: Stroke   Rolling Walker: Dx: Stroke  traMADol 50 mg oral tablet: 0.5 tab(s) orally 3 times a day MDD:75 mg

## 2021-10-18 NOTE — PROGRESS NOTE ADULT - ASSESSMENT
ASSESSMENT: 56-year-old right-handed lady first evaluated at [facility] on 10/15/2021 with headache.   CT head (10/13/2021)  showed a small hemorrhage in the left parasagittal, either parietal or perhaps perirolandic region, about 2.5 cm in greatest diameter.  CTA neck and head (10/13/2021)  was unremarkable. Beginning on or about 10/12/2021 she developed severe headache.       Impression:   small left parasagittal, either parietal or perhaps perirolandic lobar hemorrhage.  The etiology of the hemorrhage is uncertain, but a small vascular malformation, including a micro-AVM should be screened for.  Doubt hemorrhage into a metastasis.      NEURO: Neurologically stable since admission. Continue  monitoring for neurologic deterioration given cerebral edema with mass effect and brain compression, BP <160mmHg with overall normotension long term ,  statin based on outpatient risk stratificaiton , MRI Brain w/o, MRA Head w/o and Neck w/contrast, results as noted above.  SHe should have repeat in 4-6 weeks upon resolution of hemorrhage to further evaluate underlying region. Repeat CTH on 10/16 started on lovenox for DVT ppx. CT c/a/p negative for malignancy.  Cerebral angiogram in progress. Physical therapy/OT recommend outpatient PT.  Continue with pain control for HA.     ANTITHROMBOTIC THERAPY: None indicated from neurological standpoint at this time .    PULMONARY:  protecting airway, saturating well     CARDIOVASCULAR: TTE: EF 70%, 1. Normal left ventricular systolic function. No segmental wall motion abnormalities 2. Normal right ventricular size and function.3. IVC small with > 50% collapse. Estimated right atrial pressure is low suggestive of volume depletion. 4. Estimated pulmonary artery systolic pressure equals 16 mm Hg, assuming right atrial pressure equals 3  mm Hg, consistent with normal pulmonary pressures, will continue cardiac monitoring- without any recorded events.                              SBP goal: <140mmHg and overall goal of long term normotension     GASTROINTESTINAL:  dysphagia screen passed, tolerating diet, Diverticulosis Increase in number of small gastrohepatic lymph nodes, likely reactive.     Diet: Regular     RENAL: BUN/Cr within normal limits, good urine output , maintain adequate hydration  Subcentimeter left renal lesion too small to characterize, likely a cyst. Otherwise within normal limits..     Na Goal: Greater than 135     Araujo: No    HEMATOLOGY: H/H within normal limits, Platelets 230, CT CAP without evidence of malignancy she should also have all age and risk appropriate malignancy screenings      DVT ppx: lovenox sq    ID: afebrile, no leukocytosis, no signs or symptoms of infection    DISPOSITION: Home with home PT once stable and workup is complete.    CORE MEASURES:        Admission NIHSS:      TPA: [] YES [x] NO      LDL/HDL: 85/107     Depression Screen: p     Statin Therapy: n     Dysphagia Screen: [x] PASS [] FAIL     Smoking [] YES [x] NO      Afib [] YES [x] NO     Stroke Education [x] YES [] NO    Obtain screening lower extremity venous ultrasound in patients who meet 1 or more of the following criteria as patient is high risk for DVT/PE on admission:   [] History of DVT/PE  []Hypercoagulable states (Factor V Leiden, Cancer, OCP, etc. )  []Prolonged immobility (hemiplegia/hemiparesis/post operative or any other extended immobilization)  [] Transferred from outside facility (Rehab or Long term care)  [] Age </= to 50.

## 2021-10-18 NOTE — CHART NOTE - NSCHARTNOTEFT_GEN_A_CORE
Interventional Neuro Radiology  Pre-Procedure Note    This is a 57yo R-H F with no PMH presented to [facility] as a transfer from [facility] for angiogram. Initially presented as a code stroke at [facility] on 10/13 with unknown LKN. Pt started to have disorientation for 3 days. Also endorsed headache that rated as severe as 10/10 at that time but improved over the 3 days. Unclear whether had full vision loss but complained of possible right peripheral vision problems. Also was experiencing room-spinning sensation that prevented her from walking at home. This vertigo was worsened by any eye movements. Currently has mild frontal headache that goes across the forehead with mild pressure behind the eyes. Headache is not pulsating or throbbing. Light worsens the headache but position whether laying or standing does not affect the intensity of the headache. Denies any lightheadedness or room-spinning sensation, floaters, black dots, curtains on the vision, n/v, abd pain, chest pain, pain/numbness/tingling in all extremities. Lives at home, does not need help for ADLs, walks independently at baseline, never smoker, no EtOH or illicit drug use. At [facility], CTH demonstrated L pareital IPH measuring up to 3.7 cm and adjacent subarachnoid hemorrhage. CTA demonstrated no LVO. CTP was not performed. MR brain showed consistent L parietal parasagittal acute hematoma with surrounding edema in L PCA territory. Dr. De Paz and Dr. Gan saw the pt and recommended transfer for angiogram with Dr. Reagan.       Neuro Exam:   Mental status: Awake, alert, oriented x3, no aphasia, no neglect, normal memory   Cranial Nerves: subtle l facial palsy, no nystagmus, no dysarthria,  tongue midline  Motor exam: Normal tone, no drift, 5/5 RUE, RLE - subtle drift, 5/5 LUE, 5/5 LLE, normal fine finger movements.  Sensation: Intact to light touch   Coordination/ Gait: No dysmetria, gait deferred     PAST MEDICAL & SURGICAL HISTORY:  No pertinent past medical history  No significant past surgical history    Social History:   Denies tobacco use    FAMILY HISTORY:  FH: hypertension (Father)    Allergies:   No Known Allergies      Current Medications:   enoxaparin Injectable 40 milliGRAM(s) SubCutaneous daily  influenza   Vaccine 0.5 milliLiter(s) IntraMuscular once  levETIRAcetam 500 milliGRAM(s) Oral two times a day  sodium chloride 0.9%. 1000 milliLiter(s) IV Continuous <Continuous>      Labs:                         12.9   6.22  )-----------( 230      ( 18 Oct 2021 06:38 )             43.5       10-18    140  |  106  |  12  ----------------------------<  74  4.0   |  21<L>  |  0.90    Ca    9.1      18 Oct 2021 06:37  Phos  3.7     10-14  Mg     2.30     10-14    TPro  7.7  /  Alb  3.8  /  TBili  0.2  /  DBili  x   /  AST  22  /  ALT  13  /  AlkPhos  74  10-13      Blood Bank: 10-18-21  O  --  Positive      Assessment/Plan:   This is a 57yo female  presents with  L pareital IPH. Patient presents to neuro-IR for selective cerebral angiography. Procedure/ risks/ benefits/ goals/ alternatives were explained. Risks include but are not limited to stroke/ vessel injury/ hemorrhage/ groin hematoma. All questions answered. Informed content obtained from patient. Consent placed in chart.    Ayala Acevedo PA-C  [phone]
Obtain screening lower extremity venous ultrasound in patients who meet 1 or more of the following criteria as patient is high risk for DVT/PE on admission:   [] History of DVT/PE  []Hypercoagulable states (Factor V Leiden, Cancer, OCP, etc. )  []Prolonged immobility (hemiplegia/hemiparesis/post operative or any other extended immobilization)  [] Transferred from outside facility (Rehab or Long term care)  [] Age </= to 50
Interventional Neuro- Radiology   Procedure Note    Procedure: Selective Cerebral Angiography   Pre- Procedure Diagnosis: Left parietal IPH   Post- Procedure Diagnosis: No vascular malformations     : Dr. Merary MD    Physician Assistant: Ayala Acevedo PA-C    RN: Vicenta   Tech: Cecelia     Anesthesia: Dr. Jael MD (MAC)      I/Os:  Fluids: 50cc DTV   Contrast: 92cc   Estimated Blood Loss: <10cc      Preliminary Report:  Under MAC, using a 4Fr short sheath to the right groin examination of left vertebral artery/ left internal carotid artery/ left external carotid artery/ right vertebral artery/ right internal carotid artery/ right external carotid artery via selective cerebral angiography demonstrates no vascular malformations. ( Official note to follow).    Patient tolerated procedure well, vital signs stable, hemodynamically stable, no change in neurological status compared to baseline. Results discussed with neurosurgery/ patient and their family. Groin sheath d/c'ed, manual compression held to hemostasis, no active bleeding, no hematoma, quick clot and safeguard balloon dressing applied at 845h. Patient transferred to IR recovery for further care/ monitoring.       Ayala Acevedo PA-C  [phone]

## 2021-10-18 NOTE — PROGRESS NOTE ADULT - ASSESSMENT
Patient is a 56 y.o. F with no PMH presented to [facility] as a transfer from [facility] for angiogram. Initially presented as a code stroke at [facility] on 10/13 with unknown LKN. Pt started to have disorientation for 3 days. Also endorsed headache that rated as severe as 10/10 at that time but improved over the 3 days. Unclear whether had full vision loss but complained of possible R peripheral vision problems. Also was experiencing room-spinning sensation that prevented her from walking at home. This vertigo was worsened by any eye movements. Currently has mild frontal headache that goes across the forehead with mild pressure behind the eyes. Headache is not pulsating or throbbing. Light worsens the headache but position whether laying or standing does not affect the intensity of the headache. Denies any lightheadedness or room-spinning sensation, floaters, black dots, curtains on the vision, n/v, abd pain, chest pain, pain/numbness/tingling in all extremities. Lives at home, does not need help for ADLs, walks independently at baseline, never smoker, no EtOH or illicit drug use.  At [facility], CTH demonstrated L pareital IPH measuring up to 3.7 cm and adjacent subarachnoid hemorrhage. CTA demonstrated no LVO. CTP was not performed. MR brain showed consistent L parietal parasagittal acute hematoma with surrounding edema in L PCA territory. Dr. De Paz and Dr. Gan saw the pt and recommended transfer for agiongram with Dr. Reagan.     Impression: Subacute vertigo, gait instability, and b/l frontal headache likely due to L parietal parasagittal acute hematoma. Localization is L PCA territory. Etiology for hematoma include AVM vs HTN vs trauma vs r/o neoplastic     Recommendations:  [] If sudden mental status changes, would repeat CTH , noted repeat CT no acute changes   [] DVT PPX   [] Would hold statin for now  [] Symptomatic tx for headache with IV tylenol  [] TTE  [] +/- ILR depending on TTE  [] MRI brain noted   [] Card eval for BP control  [] neurology follow up, plan for Cerebral angio today  [] card preop called , appreciated recs  [] monitor vitals, check for elevated BP    [] check echo for structurral heart disease   [] Pan CT noted no malignancy, CT head noted       Labs  [] CBC, BMP, and lipid profile noted     Other  [] Telemonitoring;  Neurochecks and Vital signs per unit protocol  [] Permissive HTN up to SBP < 160 mmHg  [] BGM goals 140-180  [] PT/OT evaluation  [] resume diet, passed speech and swallow   [] Stroke education provided

## 2021-10-18 NOTE — DISCHARGE NOTE PROVIDER - HOSPITAL COURSE
HPI:  56 y.o. R-H F with no PMH presented to [facility] as a transfer from [facility] for angiogram. Initially presented as a code stroke at [facility] on 10/13 with unknown LKN. Pt started to have disorientation for 3 days. Also endorsed headache that rated as severe as 10/10 at that time but improved over the 3 days. Unclear whether had full vision loss but complained of possible R peripheral vision problems. Also was experiencing room-spinning sensation that prevented her from walking at home. This vertigo was worsened by any eye movements. Currently has mild frontal headache that goes across the forehead with mild pressure behind the eyes. Headache is not pulsating or throbbing. Light worsens the headache but position whether laying or standing does not affect the intensity of the headache. Denies any lightheadedness or room-spinning sensation, floaters, black dots, curtains on the vision, n/v, abd pain, chest pain, pain/numbness/tingling in all extremities. Lives at home, does not need help for ADLs, walks independently at baseline, never smoker, no EtOH or illicit drug use.     At [facility], CTH demonstrated L parietal IPH measuring up to 3.7 cm and adjacent subarachnoid hemorrhage. CTA demonstrated no LVO. CTP was not performed. MR brain showed consistent L parietal parasagittal acute hematoma with surrounding edema in L PCA territory. Dr. De Paz and Dr. Gan saw the pt and recommended transfer for angiogram with Dr. Reagan.  (14 Oct 2021 22:14)    Imaging:  CTH No cont (10/16/21):  Compared to previous studies, stable intraparenchymal hemorrhage in the left parietal lobule with mild increase in ashley-hemorrhagic edema, consistent with expected evolution of hemorrhage.No new hemorrhage or extension of the hemorrhage in the ventricles.    CT CHEST IC, CT ABDOMEN AND PELVIS OC IC (10/16/21):  No evidence of suspicious mass or lymphadenopathy in the chest, abdomen, or pelvis.         (10/13/21) @[facility]):   CT brain: There is an intraparenchymal hemorrhage in the left parietal lobule with perihemorrhagic edema as described. There is no extension of the hemorrhage into the ventricles, midline shift or herniation. There is no acute lobar infarction.  CTA brain: There is no large vessel occlusion or aneurysm involving major proximal intracranial arteries. There is no dominant arteriovenous malformation.  CTA NECK: There is no stenosis involving major neck arteries.  CT venogram: There is no high-grade stenosis or occlusion within the dural venous sinuses.    MR Head w/wo IV Cont (10.14.21 @ [facility]):  Left parietal parasagittal acute hematoma with surrounding edema in the distribution of the left PCA territory. No abnormal enhancement in association. No vascular hypertrophy      Labs show an HbA1c of 5.5 and an LDL of 85  Patient had been having a headache, which was controlled with analgesics.  PT/OT evaluated the pt and decided their disposition to be outpatient PT.  Cardiology was consulted and recommended an echocardiogram.   Internal medicine was consulted.   Patient had a conventional cerebral angiogram performed by Dr. Reagan on 10/18, which showed _____________________.    Impression: Beginning on or about 10/12/2021 she developed severe headache.  Denied cognitive changes, visual disturbance or vertigo.  CT head showed a small left parasagittal, either parietal or perhaps perirolandic lobar hemorrhage.  The etiology of the hemorrhage is uncertain, but a small vascular malformation, including a micro-AVM should be screened for.     Plan:  Take all medications as prescribed  Repeat MRI brain w/ and w/o contrast in 4-6 weeks  Outpatient cardiology follow up  Please follow up with neurologist in 1 week. The office will call you to schedule an appointment, if you do not hear from them please call [phone]. Continue taking medications as prescribed. If you were prescribed a statin for your cholesterol please make sure you have your liver enzymes checked with your primary care physician. Monitor your blood pressure. Reduce fat, cholesterol and salt in your diet. Increase intake of fruits and vegetables. Limit alcohol to minimum and do not smoke. You may be at risk for falling, make changes to your home to help you walk easier. Keep up to date on vaccinations.  If you experience any symptoms of facial drooping, slurred speech, arm or leg weakness, severe headache, vision changes or any worsening symptoms, notify provider immediately and return to ER. HPI:  56 y.o. R-H F with no PMH presented to [facility] as a transfer from [facility] for angiogram. Initially presented as a code stroke at [facility] on 10/13 with unknown LKN. Pt started to have disorientation for 3 days. Also endorsed headache that rated as severe as 10/10 at that time but improved over the 3 days. Unclear whether had full vision loss but complained of possible R peripheral vision problems. Also was experiencing room-spinning sensation that prevented her from walking at home. This vertigo was worsened by any eye movements. Currently has mild frontal headache that goes across the forehead with mild pressure behind the eyes. Headache is not pulsating or throbbing. Light worsens the headache but position whether laying or standing does not affect the intensity of the headache. Denies any lightheadedness or room-spinning sensation, floaters, black dots, curtains on the vision, n/v, abd pain, chest pain, pain/numbness/tingling in all extremities. Lives at home, does not need help for ADLs, walks independently at baseline, never smoker, no EtOH or illicit drug use.     At [facility], CTH demonstrated L parietal IPH measuring up to 3.7 cm and adjacent subarachnoid hemorrhage. CTA demonstrated no LVO. CTP was not performed. MR brain showed consistent L parietal parasagittal acute hematoma with surrounding edema in L PCA territory. Dr. De Paz and Dr. Gan saw the pt and recommended transfer for angiogram with Dr. Reagan.  (14 Oct 2021 22:14)    Imaging:  CTH No cont (10/16/21):  Compared to previous studies, stable intraparenchymal hemorrhage in the left parietal lobule with mild increase in ashley-hemorrhagic edema, consistent with expected evolution of hemorrhage.No new hemorrhage or extension of the hemorrhage in the ventricles.    CT CHEST IC, CT ABDOMEN AND PELVIS OC IC (10/16/21):  No evidence of suspicious mass or lymphadenopathy in the chest, abdomen, or pelvis.         (10/13/21) @[facility]):   CT brain: There is an intraparenchymal hemorrhage in the left parietal lobule with perihemorrhagic edema as described. There is no extension of the hemorrhage into the ventricles, midline shift or herniation. There is no acute lobar infarction.  CTA brain: There is no large vessel occlusion or aneurysm involving major proximal intracranial arteries. There is no dominant arteriovenous malformation.  CTA NECK: There is no stenosis involving major neck arteries.  CT venogram: There is no high-grade stenosis or occlusion within the dural venous sinuses.    MR Head w/wo IV Cont (10.14.21 @ [facility]):  Left parietal parasagittal acute hematoma with surrounding edema in the distribution of the left PCA territory. No abnormal enhancement in association. No vascular hypertrophy    IR 10/18/21: 1. No angio architectural weakness. No proximal or distal aneurysms. No pial or dural vascular malformations.  2. Bilateral cervical carotid fibromuscular dysplasia.  3. No intracranial or extracranial atherosclerotic stenosis.  4. The superficial and deep pial veins and dural venous sinuses are widely patent and drain in normal sequence. No venous occlusion. No developmental venous anomaly.    Impression: Beginning on or about 10/12/2021 she developed severe headache.  Denied cognitive changes, visual disturbance or vertigo.  CT head showed a small left parasagittal, either parietal or perhaps perirolandic lobar hemorrhage.  The etiology of the hemorrhage is uncertain, but a small vascular malformation, including a micro-AVM should be screened for.     Plan for Repeat MRI brain w/ and w/o contrast in 4-6 weeks    Evaluated by PT/OT and was recommended home with outpatient services.      HPI:  56 y.o. R-H F with no PMH presented to [facility] as a transfer from [facility] for angiogram. Initially presented as a code stroke at [facility] on 10/13 with unknown LKN. Pt started to have disorientation for 3 days. Also endorsed headache that rated as severe as 10/10 at that time but improved over the 3 days. Unclear whether had full vision loss but complained of possible R peripheral vision problems. Also was experiencing room-spinning sensation that prevented her from walking at home. This vertigo was worsened by any eye movements. Currently has mild frontal headache that goes across the forehead with mild pressure behind the eyes. Headache is not pulsating or throbbing. Light worsens the headache but position whether laying or standing does not affect the intensity of the headache. Denies any lightheadedness or room-spinning sensation, floaters, black dots, curtains on the vision, n/v, abd pain, chest pain, pain/numbness/tingling in all extremities. Lives at home, does not need help for ADLs, walks independently at baseline, never smoker, no EtOH or illicit drug use.     At [facility], CTH demonstrated L parietal IPH measuring up to 3.7 cm and adjacent subarachnoid hemorrhage. CTA demonstrated no LVO. CTP was not performed. MR brain showed consistent L parietal parasagittal acute hematoma with surrounding edema in L PCA territory. Dr. De Paz and Dr. Gan saw the pt and recommended transfer for angiogram with Dr. Reagan.  (14 Oct 2021 22:14)    Imaging:  CTH No cont (10/16/21):  Compared to previous studies, stable intraparenchymal hemorrhage in the left parietal lobule with mild increase in ashley-hemorrhagic edema, consistent with expected evolution of hemorrhage.No new hemorrhage or extension of the hemorrhage in the ventricles.    CT CHEST IC, CT ABDOMEN AND PELVIS OC IC (10/16/21):  No evidence of suspicious mass or lymphadenopathy in the chest, abdomen, or pelvis.         (10/13/21) @[facility]):   CT brain: There is an intraparenchymal hemorrhage in the left parietal lobule with perihemorrhagic edema as described. There is no extension of the hemorrhage into the ventricles, midline shift or herniation. There is no acute lobar infarction.  CTA brain: There is no large vessel occlusion or aneurysm involving major proximal intracranial arteries. There is no dominant arteriovenous malformation.  CTA NECK: There is no stenosis involving major neck arteries.  CT venogram: There is no high-grade stenosis or occlusion within the dural venous sinuses.    MR Head w/wo IV Cont (10.14.21 @ [facility]):  Left parietal parasagittal acute hematoma with surrounding edema in the distribution of the left PCA territory. No abnormal enhancement in association. No vascular hypertrophy    IR 10/18/21: 1. No angio architectural weakness. No proximal or distal aneurysms. No pial or dural vascular malformations.  2. Bilateral cervical carotid fibromuscular dysplasia.  3. No intracranial or extracranial atherosclerotic stenosis.  4. The superficial and deep pial veins and dural venous sinuses are widely patent and drain in normal sequence. No venous occlusion. No developmental venous anomaly.    Impression: Beginning on or about 10/12/2021 she developed severe headache.  Denied cognitive changes, visual disturbance or vertigo.  CT head showed a small left parasagittal, either parietal or perhaps perirolandic lobar hemorrhage.  The etiology of the hemorrhage is uncertain, but a small vascular malformation, including a micro-AVM should be screened for.     Plan for Repeat MRI brain w/ and w/o contrast in 4-6 weeks     CT c/a/p negative for malignancy.    Evaluated by PT/OT and was recommended home with outpatient services.      HPI:  56 y.o. R-H F with no PMH presented to [facility] as a transfer from [facility] for angiogram. Initially presented as a code stroke at [facility] on 10/13 with unknown LKN. Pt started to have disorientation for 3 days. Also endorsed headache that rated as severe as 10/10 at that time but improved over the 3 days. Unclear whether had full vision loss but complained of possible R peripheral vision problems. Also was experiencing room-spinning sensation that prevented her from walking at home. This vertigo was worsened by any eye movements. Currently has mild frontal headache that goes across the forehead with mild pressure behind the eyes. Headache is not pulsating or throbbing. Light worsens the headache but position whether laying or standing does not affect the intensity of the headache. Denies any lightheadedness or room-spinning sensation, floaters, black dots, curtains on the vision, n/v, abd pain, chest pain, pain/numbness/tingling in all extremities. Lives at home, does not need help for ADLs, walks independently at baseline, never smoker, no EtOH or illicit drug use.     At [facility], CTH demonstrated L parietal IPH measuring up to 3.7 cm and adjacent subarachnoid hemorrhage. CTA demonstrated no LVO. CTP was not performed. MR brain showed consistent L parietal parasagittal acute hematoma with surrounding edema in L PCA territory. Dr. De Paz and Dr. Gan saw the pt and recommended transfer for angiogram with Dr. Reagan.  (14 Oct 2021 22:14)    Imaging:  CTH No cont (10/16/21):  Compared to previous studies, stable intraparenchymal hemorrhage in the left parietal lobule with mild increase in ashley-hemorrhagic edema, consistent with expected evolution of hemorrhage.No new hemorrhage or extension of the hemorrhage in the ventricles.    CT CHEST IC, CT ABDOMEN AND PELVIS OC IC (10/16/21):  No evidence of suspicious mass or lymphadenopathy in the chest, abdomen, or pelvis.         (10/13/21) @[facility]):   CT brain: There is an intraparenchymal hemorrhage in the left parietal lobule with perihemorrhagic edema as described. There is no extension of the hemorrhage into the ventricles, midline shift or herniation. There is no acute lobar infarction.  CTA brain: There is no large vessel occlusion or aneurysm involving major proximal intracranial arteries. There is no dominant arteriovenous malformation.  CTA NECK: There is no stenosis involving major neck arteries.  CT venogram: There is no high-grade stenosis or occlusion within the dural venous sinuses.    MR Head w/wo IV Cont (10.14.21 @ [facility]):  Left parietal parasagittal acute hematoma with surrounding edema in the distribution of the left PCA territory. No abnormal enhancement in association. No vascular hypertrophy    IR 10/18/21: 1. No angio architectural weakness. No proximal or distal aneurysms. No pial or dural vascular malformations.  2. Bilateral cervical carotid fibromuscular dysplasia.  3. No intracranial or extracranial atherosclerotic stenosis.  4. The superficial and deep pial veins and dural venous sinuses are widely patent and drain in normal sequence. No venous occlusion. No developmental venous anomaly.    Impression: Beginning on or about 10/12/2021 she developed severe headache.  Denied cognitive changes, visual disturbance or vertigo.  CT head showed a small left parasagittal, either parietal or perhaps perirolandic lobar hemorrhage.  The etiology of the hemorrhage is uncertain, but a small vascular malformation, including a micro-AVM should be screened for.     Plan for Repeat MRI brain w/ and w/o contrast in 4-6 weeks     CT c/a/p negative for malignancy.    Evaluated by PT/OT and was recommended home with services.

## 2021-10-18 NOTE — CHART NOTE - NSCHARTNOTESELECT_GEN_ALL_CORE
Interventional Neuro Radiology/Event Note
Interventional Neuro Radiology/Event Note
VTE assessment/Event Note

## 2021-10-18 NOTE — PROGRESS NOTE ADULT - SUBJECTIVE AND OBJECTIVE BOX
THE PATIENT WAS SEEN AND EXAMINED BY ME WITH THE HOUSESTAFF AND STROKE TEAM DURING MORNING ROUNDS.      HPI: 56 y.o. R-H F with no PMH presented to [facility] as a transfer from [facility] for angiogram. Initially presented as a code stroke at [facility] on 10/13 with unknown LKN. Pt started to have disorientation for 3 days. Also endorsed headache that rated as severe as 10/10 at that time but improved over the 3 days. Unclear whether had full vision loss but complained of possible R peripheral vision problems. Also was experiencing room-spinning sensation that prevented her from walking at home. This vertigo was worsened by any eye movements. Currently has mild frontal headache that goes across the forehead with mild pressure behind the eyes. Headache is not pulsating or throbbing. Light worsens the headache but position whether laying or standing does not affect the intensity of the headache. Denied any lightheadedness or room-spinning sensation, floaters, black dots, curtains on the vision, n/v, abd pain, chest pain, pain/numbness/tingling in all extremities. Lives at home, does not need help for ADLs, walks independently at baseline, never smoker, no EtOH or illicit drug use.     At [facility], CTH demonstrated L pareital IPH measuring up to 3.7 cm and adjacent subarachnoid hemorrhage. CTA demonstrated no LVO. CTP was not performed. MR brain showed consistent L parietal parasagittal acute hematoma with surrounding edema in L PCA territory. Dr. De Paz and Dr. Gan saw the pt and recommended transfer for agiongram with Dr. Reagan.      SUBJECTIVE: Reported headaches  overnight.  No new neurologic complaints.  ROS reported negative unless otherwise noted.    enoxaparin Injectable 40 milliGRAM(s) SubCutaneous daily  influenza   Vaccine 0.5 milliLiter(s) IntraMuscular once      PHYSICAL EXAM:   Vital Signs Last 24 Hrs  T(C): 36.7 (18 Oct 2021 08:55), Max: 36.9 (17 Oct 2021 21:04)  T(F): 98.1 (18 Oct 2021 08:55), Max: 98.4 (17 Oct 2021 21:04)  HR: 59 (18 Oct 2021 12:55) (53 - 87)  BP: 137/76 (18 Oct 2021 12:55) (103/78 - 146/74)  BP(mean): 95 (18 Oct 2021 12:55) (76 - 106)  RR: 14 (18 Oct 2021 12:55) (12 - 20)  SpO2: 97% (18 Oct 2021 12:55) (95% - 100%)    10/17/21 exam- patient in angiogram   General: No acute distress  HEENT: EOM intact, visual fields full  Abdomen: Soft, nontender, nondistended   Extremities: No edema    NEUROLOGICAL EXAM:  Mental status: Awake, alert, oriented x3, no aphasia, no neglect, normal memory   Cranial Nerves: subtle l facial palsy, no nystagmus, no dysarthria,  tongue midline  Motor exam: Normal tone, no drift, 5/5 RUE, RLE - subtle drift, 5/5 LUE, 5/5 LLE, normal fine finger movements.  Sensation: Intact to light touch   Coordination/ Gait: No dysmetria, gait deferred     LABS:                        12.9   6.22  )-----------( 230      ( 18 Oct 2021 06:38 )             43.5    10-18    140  |  106  |  12  ----------------------------<  74  4.0   |  21<L>  |  0.90    Ca    9.1      18 Oct 2021 06:37    PT/INR - ( 18 Oct 2021 06:38 )   PT: 12.7 sec;   INR: 1.06 ratio         PTT - ( 18 Oct 2021 06:38 )  PTT:38.2 sec      IMAGING: Reviewed by me.   CTH No cont (10/16/21):  Compared to previous studies, stable intraparenchymal hemorrhage in the left parietal lobule with mild increase in ashley-hemorrhagic edema, consistent with expected evolution of hemorrhage.No new hemorrhage or extension of the hemorrhage in the ventricles.    CT CHEST IC, CT ABDOMEN AND PELVIS OC IC (10/16/21):  No evidence of suspicious mass or lymphadenopathy in the chest, abdomen, or pelvis.         (10/13/21) @[facility]):   CT brain: There is an intraparenchymal hemorrhage in the left parietal lobule with perihemorrhagic edema as described. There is no extension of the hemorrhage into the ventricles, midline shift or herniation. There is no acute lobar infarction.  CTA brain: There is no large vessel occlusion or aneurysm involving major proximal intracranial arteries. There is no dominant arteriovenous malformation.  CTA NECK: There is no stenosis involving major neck arteries.  CT venogram: There is no high-grade stenosis or occlusion within the dural venous sinuses.    MR Head w/wo IV Cont (10.14.21 @ [facility]):  Left parietal parasagittal acute hematoma with surrounding edema in the distribution of the left PCA territory. No abnormal enhancement in association. No vascular hypertrophy

## 2021-10-18 NOTE — PROGRESS NOTE ADULT - NSPROGADDITIONALINFOA_GEN_ALL_CORE
Differential diagnosis and plan of care discussed with patient after the evaluation.   Advanced care planning/advanced directives discussed with patient/family. DNR status including forceful chest compressions to attempt to restart the heart, ventilator support/artificial breathing, electric shock, artificial nutrition, health care proxy, Molst form all discussed with pt. Pt wishes to consider.   OMT on six regions for acute somatic dysfunctions done at the bedside. Importance of Fall prevention discussed. I had a prolonged conversation with patient. More than 50% of the visit was spent counseling and/or coordinating care by the attending physician.  total of sixty eight mins spent on total encounter
Differential diagnosis and plan of care discussed with patient after the evaluation.   Advanced care planning/advanced directives discussed with patient/family. DNR status including forceful chest compressions to attempt to restart the heart, ventilator support/artificial breathing, electric shock, artificial nutrition, health care proxy, Molst form all discussed with pt. Pt wishes to consider.   OMT on six regions for acute somatic dysfunctions done at the bedide   Importance of Fall prevention discussed.  I had a prolonged conversation with patient. More than 50% of the visit was spent counseling and/or coordinating care by the attending physician.  total of sixty eight mins spent on total encounter
Differential diagnosis and plan of care discussed with patient after the evaluation.   Advanced care planning/advanced directives discussed with patient/family. DNR status including forceful chest compressions to attempt to restart the heart, ventilator support/artificial breathing, electric shock, artificial nutrition, health care proxy, Molst form all discussed with pt. Pt wishes to consider.   OMT on six regions for acute somatic dysfunctions done at the bedide   Importance of Fall prevention discussed.  I had a prolonged conversation with patient. More than 50% of the visit was spent counseling and/or coordinating care by the attending physician.  total of sixty eight mins spent on total encounter

## 2021-10-18 NOTE — PROGRESS NOTE ADULT - SUBJECTIVE AND OBJECTIVE BOX
Date of Service   10-18-21 @ 16:02    Patient is a 56y old  Female who presents with a chief complaint of Headache (15 Oct 2021 14:18)      INTERVAL HISTORY: pt feels well       REVIEW OF SYSTEMS:   CONSTITUTIONAL: No weakness  EYES/ENT: No visual changes; No throat pain  Neck: No pain or stiffness  Respiratory: No cough, wheezing, No shortness of breath  CARDIOVASCULAR: no chest pain or palpitations  GASTROINTESTINAL: No abdominal pain, no nausea, vomiting or hematemesis  GENITOURINARY: No dysuria, frequency or hematuria  NEUROLOGICAL: No stroke like symptoms  SKIN: No rashes    	  MEDICATIONS:        PHYSICAL EXAM:  T(C): 36.7 (10-18-21 @ 08:55), Max: 36.9 (10-17-21 @ 21:04)  HR: 59 (10-18-21 @ 12:55) (53 - 87)  BP: 137/76 (10-18-21 @ 12:55) (103/78 - 146/74)  RR: 14 (10-18-21 @ 12:55) (12 - 20)  SpO2: 97% (10-18-21 @ 12:55) (95% - 100%)  Wt(kg): --  I&O's Summary    17 Oct 2021 07:01  -  18 Oct 2021 07:00  --------------------------------------------------------  IN: 480 mL / OUT: 0 mL / NET: 480 mL    18 Oct 2021 07:01  -  18 Oct 2021 16:02  --------------------------------------------------------  IN: 0 mL / OUT: 600 mL / NET: -600 mL          Appearance: In no distress	  HEENT:    PERRL, EOMI	  Cardiovascular:  S1 S2, No JVD  Respiratory: Lungs clear to auscultation	  Gastrointestinal:  Soft, Non-tender, + BS	  Vascularature:  No edema of LE  Psychiatric: Appropriate affect   Neuro: no acute focal deficits                               12.9   6.22  )-----------( 230      ( 18 Oct 2021 06:38 )             43.5     10-18    140  |  106  |  12  ----------------------------<  74  4.0   |  21<L>  |  0.90    Ca    9.1      18 Oct 2021 06:37          Labs personally reviewed      ASSESSMENT/PLAN: 	  56 y.o. F with no significant PMH (although does not follow with doctors) who presents as a transfer from [facility] for angiogram with dizziness, weakness, and disorientation x 2 days. She is also experiencing a HA that began 5 days ago. She currently denies any weaknesses, dizziness, palpitation or blurry vision. She is still experiencing HA 2/10. pt denies any cardiac history. Reports she gets her annual checkups and has been fine. Denies any smoking history, father has HTN. pt is obese and gets ESTEBAN at baseline, she is unable to walk 2-3 blocks 2/2 ESTEBAN. She reports occasional swelling of the legs and unable to lay flat when she sleeps 2/2 orthopnea.       Problem/Plan - 1:  ·  Problem: Intraparenchymal hemorrhage.   - serial CT head reveals intraparenchyma hemorrhage   - no evidence of cerebral venous thrombosis on CTV  - BP goal <160/90. Hydralazine 5 mg PRN q6h ordered  - monitor BP   - passed dysphagia screen, diet initiated  - Keppra 500 BID for seizure ppx  - neurology following.  - s/o cerebral angiography this am     Problem/Plan - 2:  ·  Problem: ESTEBAN  - pt is morbidly obese woman at high risk factor for cardiac disease   - monitor BP   - ECHO normal   - Lipids panel normal LDL goal less than 70 per neuro        d/c planning home this afternoon     Lillian Up [facility]   Oliverio Nelson DO [facility]  Cardiovascular Medicine  [address], [address]  Office: [phone]  Cell: [phone] Date of Service   10-18-21 @ 16:02    Patient is a 56y old  Female who presents with a chief complaint of Headache (15 Oct 2021 14:18)      INTERVAL HISTORY: pt feels well       REVIEW OF SYSTEMS:   CONSTITUTIONAL: No weakness  EYES/ENT: No visual changes; No throat pain  Neck: No pain or stiffness  Respiratory: No cough, wheezing, No shortness of breath  CARDIOVASCULAR: no chest pain or palpitations  GASTROINTESTINAL: No abdominal pain, no nausea, vomiting or hematemesis  GENITOURINARY: No dysuria, frequency or hematuria  NEUROLOGICAL: No stroke like symptoms  SKIN: No rashes    	  MEDICATIONS:        PHYSICAL EXAM:  T(C): 36.7 (10-18-21 @ 08:55), Max: 36.9 (10-17-21 @ 21:04)  HR: 59 (10-18-21 @ 12:55) (53 - 87)  BP: 137/76 (10-18-21 @ 12:55) (103/78 - 146/74)  RR: 14 (10-18-21 @ 12:55) (12 - 20)  SpO2: 97% (10-18-21 @ 12:55) (95% - 100%)  Wt(kg): --  I&O's Summary    17 Oct 2021 07:01  -  18 Oct 2021 07:00  --------------------------------------------------------  IN: 480 mL / OUT: 0 mL / NET: 480 mL    18 Oct 2021 07:01  -  18 Oct 2021 16:02  --------------------------------------------------------  IN: 0 mL / OUT: 600 mL / NET: -600 mL          Appearance: In no distress	  HEENT:    PERRL, EOMI	  Cardiovascular:  S1 S2, No JVD  Respiratory: Lungs clear to auscultation	  Gastrointestinal:  Soft, Non-tender, + BS	  Vascularature:  No edema of LE  Psychiatric: Appropriate affect   Neuro: no acute focal deficits                               12.9   6.22  )-----------( 230      ( 18 Oct 2021 06:38 )             43.5     10-18    140  |  106  |  12  ----------------------------<  74  4.0   |  21<L>  |  0.90    Ca    9.1      18 Oct 2021 06:37          Labs personally reviewed      ASSESSMENT/PLAN: 	  56 y.o. F with no significant PMH (although does not follow with doctors) who presents as a transfer from [facility] for angiogram with dizziness, weakness, and disorientation x 2 days. She is also experiencing a HA that began 5 days ago. She currently denies any weaknesses, dizziness, palpitation or blurry vision. She is still experiencing HA 2/10. pt denies any cardiac history. Reports she gets her annual checkups and has been fine. Denies any smoking history, father has HTN. pt is obese and gets ESTEBAN at baseline, she is unable to walk 2-3 blocks 2/2 ESTEBAN. She reports occasional swelling of the legs and unable to lay flat when she sleeps 2/2 orthopnea.       Problem/Plan - 1:  ·  Problem: Intraparenchymal hemorrhage.   - serial CT head reveals intraparenchyma hemorrhage   - no evidence of cerebral venous thrombosis on CTV  - BP goal <160/90. Hydralazine 5 mg PRN q6h ordered  - monitor BP   - passed dysphagia screen, diet initiated  - Keppra 500 BID for seizure ppx  - neurology following.  - s/o cerebral angiography this am, normal     Problem/Plan - 2:  ·  Problem: ESTEBAN  - pt is morbidly obese woman at high risk factor for cardiac disease   - monitor BP   - ECHO normal   - Lipids panel normal LDL goal less than 70 per neuro        d/c planning home this afternoon     Lillian Up [facility]   Oliverio Nelson DO [facility]  Cardiovascular Medicine  [address], [address]  Office: [phone]  Cell: [phone]

## 2021-10-18 NOTE — PROGRESS NOTE ADULT - SUBJECTIVE AND OBJECTIVE BOX
Name of Patient : HARRIETT SHANNON  MRN: [id number]  Date of visit: 10-18-21       Subjective: Patient seen and examined. No new events except as noted.   Doing okay  Cerebral angio today     REVIEW OF SYSTEMS:    CONSTITUTIONAL: No weakness, fevers or chills  EYES/ENT: No visual changes;  No vertigo or throat pain   NECK: No pain or stiffness  RESPIRATORY: No cough, wheezing, hemoptysis; No shortness of breath  CARDIOVASCULAR: No chest pain or palpitations  GASTROINTESTINAL: No abdominal or epigastric pain. No nausea, vomiting, or hematemesis; No diarrhea or constipation. No melena or hematochezia.  GENITOURINARY: No dysuria, frequency or hematuria  NEUROLOGICAL: No numbness or weakness  SKIN: No itching, burning, rashes, or lesions   All other review of systems is negative unless indicated above.    MEDICATIONS:  MEDICATIONS  (STANDING):  enoxaparin Injectable 40 milliGRAM(s) SubCutaneous daily  influenza   Vaccine 0.5 milliLiter(s) IntraMuscular once  traMADol 25 milliGRAM(s) Oral once      PHYSICAL EXAM:  T(C): 36.7 (10-18-21 @ 08:55), Max: 36.9 (10-17-21 @ 21:04)  HR: 59 (10-18-21 @ 12:55) (53 - 87)  BP: 137/76 (10-18-21 @ 12:55) (103/78 - 146/74)  RR: 14 (10-18-21 @ 12:55) (12 - 20)  SpO2: 97% (10-18-21 @ 12:55) (95% - 100%)  Wt(kg): --  I&O's Summary    17 Oct 2021 07:01  -  18 Oct 2021 07:00  --------------------------------------------------------  IN: 480 mL / OUT: 0 mL / NET: 480 mL    18 Oct 2021 07:01  -  18 Oct 2021 16:24  --------------------------------------------------------  IN: 0 mL / OUT: 600 mL / NET: -600 mL          Appearance: Normal	  HEENT:  PERRLA   Lymphatic: No lymphadenopathy   Cardiovascular: Normal S1 S2, no JVD  Respiratory: normal effort , clear  Gastrointestinal:  Soft, Non-tender  Skin: No rashes,  warm to touch  Psychiatry:  Mood & affect appropriate  Musculuskeletal: No edema      All labs, Imaging and EKGs personally reviewed     10-17-21 @ 07:01  -  10-18-21 @ 07:00  --------------------------------------------------------  IN: 480 mL / OUT: 0 mL / NET: 480 mL    10-18-21 @ 07:01  -  10-18-21 @ 16:24  --------------------------------------------------------  IN: 0 mL / OUT: 600 mL / NET: -600 mL                          12.9   6.22  )-----------( 230      ( 18 Oct 2021 06:38 )             43.5               10-18    140  |  106  |  12  ----------------------------<  74  4.0   |  21<L>  |  0.90    Ca    9.1      18 Oct 2021 06:37      PT/INR - ( 18 Oct 2021 06:38 )   PT: 12.7 sec;   INR: 1.06 ratio         PTT - ( 18 Oct 2021 06:38 )  PTT:38.2 sec

## 2021-10-18 NOTE — DISCHARGE NOTE PROVIDER - NPI NUMBER (FOR SYSADMIN USE ONLY) :
[[id number]] [[id number]],[[id number]] [[id number]],[[id number]],[[id number]],[[id number]]

## 2021-10-25 PROBLEM — Z00.00 ENCOUNTER FOR PREVENTIVE HEALTH EXAMINATION: Status: ACTIVE | Noted: 2021-10-25

## 2021-11-09 ENCOUNTER — APPOINTMENT (OUTPATIENT)
Dept: NEUROLOGY | Facility: CLINIC | Age: 56
End: 2021-11-09
Payer: COMMERCIAL

## 2021-11-09 VITALS
HEART RATE: 59 BPM | BODY MASS INDEX: 47.58 KG/M2 | WEIGHT: 252 LBS | SYSTOLIC BLOOD PRESSURE: 163 MMHG | DIASTOLIC BLOOD PRESSURE: 91 MMHG | HEIGHT: 61 IN

## 2021-11-09 DIAGNOSIS — Z78.9 OTHER SPECIFIED HEALTH STATUS: ICD-10-CM

## 2021-11-09 PROCEDURE — 99215 OFFICE O/P EST HI 40 MIN: CPT

## 2021-11-09 NOTE — PHYSICAL EXAM
[General Appearance - Alert] : alert [General Appearance - In No Acute Distress] : in no acute distress [Oriented To Time, Place, And Person] : oriented to person, place, and time [Affect] : the affect was normal [Person] : oriented to person [Place] : oriented to place [Time] : oriented to time [Fluency] : fluency intact [Cranial Nerves Optic (II)] : visual acuity intact bilaterally,  visual fields full to confrontation, pupils equal round and reactive to light [Cranial Nerves Oculomotor (III)] : extraocular motion intact [Cranial Nerves Trigeminal (V)] : facial sensation intact symmetrically [Cranial Nerves Facial (VII)] : face symmetrical [Cranial Nerves Vestibulocochlear (VIII)] : hearing was intact bilaterally [Cranial Nerves Glossopharyngeal (IX)] : tongue and palate midline [Cranial Nerves Accessory (XI - Cranial And Spinal)] : head turning and shoulder shrug symmetric [Cranial Nerves Hypoglossal (XII)] : there was no tongue deviation with protrusion [Motor Strength] : muscle strength was normal in all four extremities [Sensation Tactile Decrease] : light touch was intact [Sclera] : the sclera and conjunctiva were normal [Extraocular Movements] : extraocular movements were intact [Full Visual Field] : full visual field [Outer Ear] : the ears and nose were normal in appearance [Neck Appearance] : the appearance of the neck was normal [] : no respiratory distress [Exaggerated Use Of Accessory Muscles For Inspiration] : no accessory muscle use [Heart Rate And Rhythm] : heart rate was normal and rhythm regular [Edema] : there was no peripheral edema [Involuntary Movements] : no involuntary movements were seen [Motor Tone] : muscle strength and tone were normal [Skin Color & Pigmentation] : normal skin color and pigmentation [Paresis Pronator Drift Right-Sided] : no pronator drift on the right [Paresis Pronator Drift Left-Sided] : no pronator drift on the left [Motor Strength Upper Extremities Bilaterally] : strength was normal in both upper extremities [Motor Strength Lower Extremities Bilaterally] : strength was normal in both lower extremities [Coordination - Dysmetria Impaired Finger-to-Nose Bilateral] : not present [FreeTextEntry8] : ambulates with walker

## 2021-11-09 NOTE — CONSULT LETTER
[Dear  ___] : Dear  [unfilled], [Consult Letter:] : I had the pleasure of evaluating your patient, [unfilled]. [Please see my note below.] : Please see my note below. [Consult Closing:] : Thank you very much for allowing me to participate in the care of this patient.  If you have any questions, please do not hesitate to contact me. [Sincerely,] : Sincerely, [FreeTextEntry3] : Geraldo Reagan MD\par Chief, Vascular Neurology and Neurology Service , NeuroEndovascular Surgery\par  of Neurology and Radiology\par [facility] [employer] [facility]\par Director, Comprehensive Stroke Center and Stroke Unit\par [facility]\par Director, NeuroEndovascular Surgery\par [facility]\par

## 2021-11-09 NOTE — REVIEW OF SYSTEMS
[Memory Lapses or Loss] : memory loss [Tension Headache] : tension-type headaches [Fever] : no fever [Chills] : no chills [Feeling Poorly] : not feeling poorly [Feeling Tired] : not feeling tired [Confused or Disoriented] : no confusion [Difficulty with Language] : no ~M difficulty with language [Changed Thought Patterns] : no change in thought patterns [Repeating Questions] : no repeated questioning about recent events [Facial Weakness] : no facial weakness [Arm Weakness] : no arm weakness [Hand Weakness] : no hand weakness [Leg Weakness] : no leg weakness [Poor Coordination] : good coordination [Difficulty Writing] : no difficulty writing [Difficulties in Speech] : no speech difficulties [Suicidal] : not suicidal [Anxiety] : no anxiety [Depression] : no depression [Eye Pain] : no eye pain [Red Eyes] : eyes not red [Eyesight Problems] : no eyesight problems [Earache] : no earache [Loss Of Hearing] : no hearing loss [Nosebleeds] : no nosebleeds [Nasal Discharge] : no nasal discharge [Sore Throat] : no sore throat [Heart Rate Is Slow] : the heart rate was not slow [Heart Rate Is Fast] : the heart rate was not fast [Chest Pain] : no chest pain [Palpitations] : no palpitations [Leg Claudication] : no intermittent leg claudication [Lower Ext Edema] : no lower extremity edema [Shortness Of Breath] : no shortness of breath [Wheezing] : no wheezing [Cough] : no cough [SOB on Exertion] : no shortness of breath during exertion [Orthopnea] : no orthopnea [PND] : no PND [Abdominal Pain] : no abdominal pain [Vomiting] : no vomiting [Constipation] : no constipation [Diarrhea] : no diarrhea [Heartburn] : no heartburn [Melena] : no melena [Dysuria] : no dysuria [Incontinence] : no incontinence [Pelvic Pain] : no pelvic pain [Dysmenorrhea] : no dysmenorrhea [Vaginal Discharge] : no vaginal discharge [Abn Vaginal Bleeding] : no unexplained vaginal bleeding [Arthralgias] : no arthralgias [Joint Pain] : no joint pain [Joint Swelling] : no joint swelling [Joint Stiffness] : no joint stiffness [Limb Pain] : no limb pain [Limb Swelling] : no limb swelling [Skin Lesions] : no skin lesions [Skin Wound] : no skin wound [Itching] : no itching [Change In A Mole] : no change in a mole [Breast Pain] : no breast pain [Breast Lump] : no breast lump [Proptosis] : no proptosis [Hot Flashes] : no hot flashes [Muscle Weakness] : no muscle weakness [Deepening Of The Voice] : no deepening of the voice [Feelings Of Weakness] : no feelings of weakness [Easy Bleeding] : no tendency for easy bleeding [Easy Bruising] : no tendency for easy bruising [Swollen Glands] : no swollen glands [Swollen Glands In The Neck] : no swollen glands in the neck

## 2021-11-09 NOTE — DISCUSSION/SUMMARY
[FreeTextEntry1] : Ms. Gaviria is 55 y/o woman who presents following hospitalization at [facility] for L parietal IPH. She underwent a cerebral angiogram on 10/18/21 which revealed no vascular malformations, aneurysms, or intracranial or extracranial atherosclerotic stenosis. On December 21st 2021 (6 weeks s/p IPH) she will undergo an MRI head with and without contrast. If the MRI demonstrates resolution of the bleed, we will repeat a cerebral angiogram to assess for any underlying malformations that were concealed by the bleed. The patient seems most distressed about her complaints about memory. I am recommending she monitor it, as she has already been experiencing improvement, I suspect she will continue to further improve with time. i explained that it can take months after an event like this for symptoms to resolve. She will have a follow up appointment on 12/21/21 after her MRI. All questions and concerns have been addressed.\par \par

## 2021-11-09 NOTE — HISTORY OF PRESENT ILLNESS
[FreeTextEntry1] : Ms. Madera is 57 y/o right handed woman who presents following hospitalization at [facility] for L parietal IPH. She initially presented as a code stroke at [facility] on 10/13 with unknown LKN. Pt started to have disorientation for 3 days. Also endorsed headache that rated as severe as 10/10 at that time but improved over the 3 days. Unclear whether had full vision loss but complained of possible R peripheral vision problems. Also was experiencing room-spinning sensation that prevented her from walking at home.. At [facility], CTH demonstrated L parietal IPH measuring up to 3.7 cm and adjacent subarachnoid hemorrhage. CTA demonstrated no LVO. CTP was not performed. MR brain showed consistent L parietal parasagittal acute hematoma with surrounding edema in L PCA territory. She was transferred to [facility] for cerebral angiogram. She underwent cerebral angiogram on 10/18/21 which revealed: no angio architectural weakness. No proximal or distal aneurysms. No pial or dural vascular malformations. Bilateral cervical carotid fibromuscular dysplasia. No intracranial or extracranial atherosclerotic stenosis. The superficial and deep pial veins and dural venous sinuses are widely patent and drain in normal sequence. No venous occlusion. No developmental venous anomaly.\par \par Today, she is reporting daily headaches, sensitivity to light,  and unsteadiness when walking. She reports that if she gets up too fast, she feels dizzy. She reports experiencing short term memory loss since hospitalization, which has gradually been getting better.  She completed physical therapy following hospitalization. \par \par

## 2021-11-09 NOTE — END OF VISIT
Problem: Falls - Risk of:  Goal: Will remain free from falls  Description: Will remain free from falls  10/31/2021 2102 by Cy Perez RN  Outcome: Ongoing  10/31/2021 1019 by Ge Amaya RN  Outcome: Met This Shift  Goal: Absence of physical injury  Description: Absence of physical injury  10/31/2021 2102 by Cy Perez RN  Outcome: Ongoing  10/31/2021 1019 by Ge Amaya RN  Outcome: Met This Shift     Problem: Discharge Planning:  Goal: Discharged to appropriate level of care  Description: Discharged to appropriate level of care  10/31/2021 2102 by Cy Perez RN  Outcome: Ongoing  10/31/2021 1019 by Ge Amaya RN  Outcome: Ongoing     Problem: Serum Glucose Level - Abnormal:  Goal: Ability to maintain appropriate glucose levels has stabilized  Description: Ability to maintain appropriate glucose levels has stabilized  10/31/2021 2102 by Cy Perez RN  Outcome: Ongoing  10/31/2021 1019 by Ge Amaya RN  Outcome: Ongoing [Time Spent: ___ minutes] : I have spent [unfilled] minutes of time on the encounter.

## 2021-12-06 ENCOUNTER — TRANSCRIPTION ENCOUNTER (OUTPATIENT)
Age: 56
End: 2021-12-06

## 2022-03-29 ENCOUNTER — NON-APPOINTMENT (OUTPATIENT)
Age: 57
End: 2022-03-29

## 2022-03-29 ENCOUNTER — APPOINTMENT (OUTPATIENT)
Dept: PULMONOLOGY | Facility: CLINIC | Age: 57
End: 2022-03-29
Payer: COMMERCIAL

## 2022-03-29 VITALS
HEART RATE: 75 BPM | DIASTOLIC BLOOD PRESSURE: 73 MMHG | SYSTOLIC BLOOD PRESSURE: 107 MMHG | TEMPERATURE: 98.2 F | OXYGEN SATURATION: 98 %

## 2022-03-29 DIAGNOSIS — I10 ESSENTIAL (PRIMARY) HYPERTENSION: ICD-10-CM

## 2022-03-29 DIAGNOSIS — G47.33 OBSTRUCTIVE SLEEP APNEA (ADULT) (PEDIATRIC): ICD-10-CM

## 2022-03-29 DIAGNOSIS — Z82.49 FAMILY HISTORY OF ISCHEMIC HEART DISEASE AND OTHER DISEASES OF THE CIRCULATORY SYSTEM: ICD-10-CM

## 2022-03-29 DIAGNOSIS — I61.9 NONTRAUMATIC INTRACEREBRAL HEMORRHAGE, UNSPECIFIED: ICD-10-CM

## 2022-03-29 PROCEDURE — 99204 OFFICE O/P NEW MOD 45 MIN: CPT

## 2022-03-29 RX ORDER — NIFEDIPINE 30 MG/1
30 TABLET, EXTENDED RELEASE ORAL
Refills: 0 | Status: ACTIVE | COMMUNITY

## 2022-03-29 RX ORDER — BUTALBITAL, ACETAMINOPHEN, AND CAFFEINE 50; 300; 40 MG/1; MG/1; MG/1
50-300-40 CAPSULE ORAL
Refills: 0 | Status: COMPLETED | COMMUNITY
End: 2022-03-29

## 2022-03-29 RX ORDER — LISINOPRIL AND HYDROCHLOROTHIAZIDE 25; 20 MG/1; MG/1
25-20 TABLET ORAL
Refills: 0 | Status: ACTIVE | COMMUNITY

## 2022-03-31 PROBLEM — I61.9 INTRAPARENCHYMAL HEMORRHAGE OF BRAIN: Status: ACTIVE | Noted: 2021-11-09

## 2022-03-31 NOTE — ASSESSMENT
[FreeTextEntry1] : History very confusing.  Suspect large component of sleep stage misperception in the setting of sleep apnea.  Especially given body habitus and history of cerebrovascular disease sleep apnea should be ruled out and will arrange home sleep study.  If this is nondiagnostic we will consider in lab study with full EEG and video montage

## 2022-03-31 NOTE — HISTORY OF PRESENT ILLNESS
[FreeTextEntry1] : Initial evaluation \par \par Does not get much sleep at night; experienced shaking, that occurred twice in one night, which she was aware of and feels that it happens when she is going into a deeper sleep; was given Topiramate by neurologist, but stopped taking on her own\par \par Had brain bleed in October 2021\par \par Snores.  Is unaware of sleepiness but accompanying person says she is very tired.

## 2022-06-12 ENCOUNTER — INPATIENT (INPATIENT)
Facility: HOSPITAL | Age: 57
LOS: 2 days | Discharge: HOME | End: 2022-06-15
Attending: INTERNAL MEDICINE | Admitting: INTERNAL MEDICINE
Payer: COMMERCIAL

## 2022-06-12 VITALS
TEMPERATURE: 98 F | RESPIRATION RATE: 18 BRPM | OXYGEN SATURATION: 100 % | DIASTOLIC BLOOD PRESSURE: 70 MMHG | HEART RATE: 60 BPM | SYSTOLIC BLOOD PRESSURE: 132 MMHG | HEIGHT: 55 IN

## 2022-06-12 DIAGNOSIS — R55 SYNCOPE AND COLLAPSE: ICD-10-CM

## 2022-06-12 LAB
ALBUMIN SERPL ELPH-MCNC: 3.9 G/DL — SIGNIFICANT CHANGE UP (ref 3.3–5)
ALP SERPL-CCNC: 81 U/L — SIGNIFICANT CHANGE UP (ref 40–120)
ALT FLD-CCNC: 15 U/L — SIGNIFICANT CHANGE UP (ref 4–33)
ANION GAP SERPL CALC-SCNC: 12 MMOL/L — SIGNIFICANT CHANGE UP (ref 7–14)
APPEARANCE UR: CLEAR — SIGNIFICANT CHANGE UP
AST SERPL-CCNC: 21 U/L — SIGNIFICANT CHANGE UP (ref 4–32)
BASOPHILS # BLD AUTO: 0.01 K/UL — SIGNIFICANT CHANGE UP (ref 0–0.2)
BASOPHILS NFR BLD AUTO: 0.1 % — SIGNIFICANT CHANGE UP (ref 0–2)
BILIRUB SERPL-MCNC: <0.2 MG/DL — SIGNIFICANT CHANGE UP (ref 0.2–1.2)
BILIRUB UR-MCNC: NEGATIVE — SIGNIFICANT CHANGE UP
BLOOD GAS VENOUS COMPREHENSIVE RESULT: SIGNIFICANT CHANGE UP
BUN SERPL-MCNC: 12 MG/DL — SIGNIFICANT CHANGE UP (ref 7–23)
CALCIUM SERPL-MCNC: 9.3 MG/DL — SIGNIFICANT CHANGE UP (ref 8.4–10.5)
CHLORIDE SERPL-SCNC: 103 MMOL/L — SIGNIFICANT CHANGE UP (ref 98–107)
CO2 SERPL-SCNC: 20 MMOL/L — LOW (ref 22–31)
COLOR SPEC: SIGNIFICANT CHANGE UP
COMMENT - URINE: SIGNIFICANT CHANGE UP
CREAT SERPL-MCNC: 0.98 MG/DL — SIGNIFICANT CHANGE UP (ref 0.5–1.3)
DIFF PNL FLD: NEGATIVE — SIGNIFICANT CHANGE UP
EGFR: 68 ML/MIN/1.73M2 — SIGNIFICANT CHANGE UP
EOSINOPHIL # BLD AUTO: 0 K/UL — SIGNIFICANT CHANGE UP (ref 0–0.5)
EOSINOPHIL NFR BLD AUTO: 0 % — SIGNIFICANT CHANGE UP (ref 0–6)
EPI CELLS # UR: 6 /HPF — HIGH (ref 0–5)
FLUAV AG NPH QL: SIGNIFICANT CHANGE UP
FLUBV AG NPH QL: SIGNIFICANT CHANGE UP
GLUCOSE SERPL-MCNC: 114 MG/DL — HIGH (ref 70–99)
GLUCOSE UR QL: NEGATIVE — SIGNIFICANT CHANGE UP
HCT VFR BLD CALC: 40.6 % — SIGNIFICANT CHANGE UP (ref 34.5–45)
HGB BLD-MCNC: 12.1 G/DL — SIGNIFICANT CHANGE UP (ref 11.5–15.5)
IANC: 7.88 K/UL — HIGH (ref 1.8–7.4)
IMM GRANULOCYTES NFR BLD AUTO: 0.4 % — SIGNIFICANT CHANGE UP (ref 0–1.5)
KETONES UR-MCNC: NEGATIVE — SIGNIFICANT CHANGE UP
LEUKOCYTE ESTERASE UR-ACNC: NEGATIVE — SIGNIFICANT CHANGE UP
LYMPHOCYTES # BLD AUTO: 1.16 K/UL — SIGNIFICANT CHANGE UP (ref 1–3.3)
LYMPHOCYTES # BLD AUTO: 12.2 % — LOW (ref 13–44)
MAGNESIUM SERPL-MCNC: 2.4 MG/DL — SIGNIFICANT CHANGE UP (ref 1.6–2.6)
MCHC RBC-ENTMCNC: 25.5 PG — LOW (ref 27–34)
MCHC RBC-ENTMCNC: 29.8 GM/DL — LOW (ref 32–36)
MCV RBC AUTO: 85.5 FL — SIGNIFICANT CHANGE UP (ref 80–100)
MONOCYTES # BLD AUTO: 0.41 K/UL — SIGNIFICANT CHANGE UP (ref 0–0.9)
MONOCYTES NFR BLD AUTO: 4.3 % — SIGNIFICANT CHANGE UP (ref 2–14)
NEUTROPHILS # BLD AUTO: 7.88 K/UL — HIGH (ref 1.8–7.4)
NEUTROPHILS NFR BLD AUTO: 83 % — HIGH (ref 43–77)
NITRITE UR-MCNC: NEGATIVE — SIGNIFICANT CHANGE UP
NRBC # BLD: 0 /100 WBCS — SIGNIFICANT CHANGE UP
NRBC # FLD: 0 K/UL — SIGNIFICANT CHANGE UP
PH UR: 6.5 — SIGNIFICANT CHANGE UP (ref 5–8)
PHOSPHATE SERPL-MCNC: 2.7 MG/DL — SIGNIFICANT CHANGE UP (ref 2.5–4.5)
PLATELET # BLD AUTO: 236 K/UL — SIGNIFICANT CHANGE UP (ref 150–400)
POTASSIUM SERPL-MCNC: 4.6 MMOL/L — SIGNIFICANT CHANGE UP (ref 3.5–5.3)
POTASSIUM SERPL-SCNC: 4.6 MMOL/L — SIGNIFICANT CHANGE UP (ref 3.5–5.3)
PROT SERPL-MCNC: 7.7 G/DL — SIGNIFICANT CHANGE UP (ref 6–8.3)
PROT UR-MCNC: NEGATIVE — SIGNIFICANT CHANGE UP
RBC # BLD: 4.75 M/UL — SIGNIFICANT CHANGE UP (ref 3.8–5.2)
RBC # FLD: 15.3 % — HIGH (ref 10.3–14.5)
RBC CASTS # UR COMP ASSIST: 1 /HPF — SIGNIFICANT CHANGE UP (ref 0–4)
RSV RNA NPH QL NAA+NON-PROBE: SIGNIFICANT CHANGE UP
SARS-COV-2 RNA SPEC QL NAA+PROBE: SIGNIFICANT CHANGE UP
SODIUM SERPL-SCNC: 135 MMOL/L — SIGNIFICANT CHANGE UP (ref 135–145)
SP GR SPEC: 1.02 — SIGNIFICANT CHANGE UP (ref 1–1.05)
TROPONIN T, HIGH SENSITIVITY RESULT: <6 NG/L — SIGNIFICANT CHANGE UP
TSH SERPL-MCNC: 1.37 UIU/ML — SIGNIFICANT CHANGE UP (ref 0.27–4.2)
UROBILINOGEN FLD QL: SIGNIFICANT CHANGE UP
WBC # BLD: 9.5 K/UL — SIGNIFICANT CHANGE UP (ref 3.8–10.5)
WBC # FLD AUTO: 9.5 K/UL — SIGNIFICANT CHANGE UP (ref 3.8–10.5)
WBC UR QL: 3 /HPF — SIGNIFICANT CHANGE UP (ref 0–5)

## 2022-06-12 PROCEDURE — 99255 IP/OBS CONSLTJ NEW/EST HI 80: CPT

## 2022-06-12 PROCEDURE — 99285 EMERGENCY DEPT VISIT HI MDM: CPT

## 2022-06-12 PROCEDURE — 71045 X-RAY EXAM CHEST 1 VIEW: CPT | Mod: 26

## 2022-06-12 PROCEDURE — 70450 CT HEAD/BRAIN W/O DYE: CPT | Mod: 26,MA

## 2022-06-12 RX ORDER — METOCLOPRAMIDE HCL 10 MG
10 TABLET ORAL ONCE
Refills: 0 | Status: COMPLETED | OUTPATIENT
Start: 2022-06-12 | End: 2022-06-12

## 2022-06-12 RX ORDER — HEPARIN SODIUM 5000 [USP'U]/ML
5000 INJECTION INTRAVENOUS; SUBCUTANEOUS EVERY 8 HOURS
Refills: 0 | Status: DISCONTINUED | OUTPATIENT
Start: 2022-06-12 | End: 2022-06-15

## 2022-06-12 RX ORDER — ACETAMINOPHEN 500 MG
650 TABLET ORAL ONCE
Refills: 0 | Status: COMPLETED | OUTPATIENT
Start: 2022-06-12 | End: 2022-06-13

## 2022-06-12 RX ORDER — ACETAMINOPHEN 500 MG
650 TABLET ORAL ONCE
Refills: 0 | Status: COMPLETED | OUTPATIENT
Start: 2022-06-12 | End: 2022-06-12

## 2022-06-12 RX ORDER — TOPIRAMATE 25 MG
25 TABLET ORAL
Refills: 0 | Status: DISCONTINUED | OUTPATIENT
Start: 2022-06-12 | End: 2022-06-15

## 2022-06-12 RX ORDER — SODIUM CHLORIDE 9 MG/ML
1000 INJECTION INTRAMUSCULAR; INTRAVENOUS; SUBCUTANEOUS ONCE
Refills: 0 | Status: COMPLETED | OUTPATIENT
Start: 2022-06-12 | End: 2022-06-12

## 2022-06-12 RX ORDER — INFLUENZA VIRUS VACCINE 15; 15; 15; 15 UG/.5ML; UG/.5ML; UG/.5ML; UG/.5ML
0.5 SUSPENSION INTRAMUSCULAR ONCE
Refills: 0 | Status: DISCONTINUED | OUTPATIENT
Start: 2022-06-12 | End: 2022-06-12

## 2022-06-12 RX ORDER — NIFEDIPINE 30 MG
30 TABLET, EXTENDED RELEASE 24 HR ORAL DAILY
Refills: 0 | Status: DISCONTINUED | OUTPATIENT
Start: 2022-06-12 | End: 2022-06-15

## 2022-06-12 RX ADMIN — HEPARIN SODIUM 5000 UNIT(S): 5000 INJECTION INTRAVENOUS; SUBCUTANEOUS at 21:25

## 2022-06-12 RX ADMIN — Medication 25 MILLIGRAM(S): at 17:52

## 2022-06-12 RX ADMIN — Medication 10 MILLIGRAM(S): at 05:27

## 2022-06-12 RX ADMIN — SODIUM CHLORIDE 1000 MILLILITER(S): 9 INJECTION INTRAMUSCULAR; INTRAVENOUS; SUBCUTANEOUS at 05:27

## 2022-06-12 RX ADMIN — Medication 650 MILLIGRAM(S): at 17:04

## 2022-06-12 RX ADMIN — Medication 650 MILLIGRAM(S): at 18:05

## 2022-06-12 NOTE — H&P ADULT - NSHPREVIEWOFSYSTEMS_GEN_ALL_CORE
REVIEW OF SYSTEMS:    CONSTITUTIONAL: + weakness   EYES/ENT: No visual changes;  No vertigo or throat pain   NECK: No pain or stiffness  RESPIRATORY: No cough, wheezing, hemoptysis; No shortness of breath  CARDIOVASCULAR: No chest pain or palpitations  GASTROINTESTINAL: No abdominal or epigastric pain. No nausea, vomiting, or hematemesis; No diarrhea or constipation. No melena or hematochezia.  GENITOURINARY: No dysuria, frequency or hematuria  NEUROLOGICAL: No numbness or weakness  SKIN: No itching, burning, rashes, or lesions   All other review of systems is negative unless indicated above.

## 2022-06-12 NOTE — H&P ADULT - ASSESSMENT
Patient is a 58 Y/O Female with PMHx of CVA (Oct, 2021), HTN presenting with syncope. Pt reports after going to bed, finding herself on the floor with unilateral weakness and unrination and subsequently in bed though does not recall how. Pt is a poor historian, denies weakness, HA, lightheadedness. Pt self dc'd prescribed medications: Nifedipine 30mg, Lisinopril-HCTZ 20/25mg; Topiramate 25mg BID  most of her symptoms have resolved in the hospital       # Unwitnessed syncope and LOC   unknonw etiology   R/O Seizure, vs TIA Vs occult arrythmia   Patient self D/C all her home meds  including BP and seizure meds  Neurology eval appreciated  EEG.  resume topamax for now, may change since patient states that it causes weakness   MRI brain w/wo contrast  check Echo  Loop eval , EKG , Tele   monitor on tele   check orthostatics   monitor electrolytes  fall precautions  PT eval     # HTN   resume nifedipine and adjust as tolerated  Check Echo   card follow up     DVT and GI PPX

## 2022-06-12 NOTE — ED ADULT NURSE NOTE - OBJECTIVE STATEMENT
58yo female received in room 10. pt A&Ox4, ambulatory, hx of seizure, CVA (brain bleed) with no residual, HTN, c/o episode of syncope, headache, dizziness, and nausea starting tonight. As per patient, she states she found herself on the floor around 2am, urinated on herself, unsure if struck head and how long she had been on the floor,  and later woke up again in her bed with headache and dizziness. Pt stopped taking her topiramate around April, was suppose to have sleep study done for seizure but hasn't done it yet. Patient upper and lower extremities strength equal. No facial droopiness and slurred speech noted. Sensation intact, denies tingling or numbness. Respiration even and non-labored. in NAD. NSR on monitor MD english in progress. Side rails up, bed at lowest position, call bell within reach, patient oriented to the unit, safety maintained. Seizure precaution initiated. 20G IV placed in RAC, lab drawn and sent.

## 2022-06-12 NOTE — ED ADULT NURSE NOTE - NSIMPLEMENTINTERV_GEN_ALL_ED
Labs ordered 06/26/20. Some have been done since at the hospital. Can we cancel orders?    Implemented All Fall Risk Interventions:  [place] to call system. Call bell, personal items and telephone within reach. Instruct patient to call for assistance. Room bathroom lighting operational. Non-slip footwear when patient is off stretcher. Physically safe environment: no spills, clutter or unnecessary equipment. Stretcher in lowest position, wheels locked, appropriate side rails in place. Provide visual cue, wrist band, yellow gown, etc. Monitor gait and stability. Monitor for mental status changes and reorient to person, place, and time. Review medications for side effects contributing to fall risk. Reinforce activity limits and safety measures with patient and family.

## 2022-06-12 NOTE — ED ADULT NURSE NOTE - CHIEF COMPLAINT QUOTE
Pt st" Sometime before midnight not really sure I passed out....I woke up around 2 am on floor...I urinated on self....I remember I had headache yesterday still have headache dizzy..... I had a brain bleed in oct....I had memory loss after that." " I was suppose to do sleep study because not sure if I am having siezures."

## 2022-06-12 NOTE — CONSULT NOTE ADULT - SUBJECTIVE AND OBJECTIVE BOX
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 in reverse.    - Cranial Nerves:  II: Visual fields are full to confrontation; pupils are equal, round, and reactive to light   III, IV, VI: Extraocular movements are intact without nystagmus  V: Facial sensation is intact in the V1-V3 distribution bilaterally  VII: Face is symmetric with normal eye closure and smile  VIII: Hearing is intact to finger rub  IX, X: Uvula is midline and soft palate rises symmetrically  XI: Shoulder shrug intact  XII: Tongue protrudes in the midline    - Motor/Strength Testing:                                 Right           Left  Deltoid                     5                 5  Biceps                      5                 5  Triceps                     5                 5  Hand                  5                 5    Hip Flex                   5                  5  Knee Ext	      5                  5  Dorsiflex                  5                  5  Plantarflex               5                  5    - There is no pronator drift.   - Normal muscle bulk and tone throughout.    - Reflexes:   Bicep (C5/C6):                  R 2+ --- L 2+   Brachioradialis (C5/C6) :   R 2+ --- L 2+   Patella (L3/L4) :                 R 3+ --- L 3+   Ankle (S1) :                       R 2+ --- L 2+     - Plant responses: withdraws bilaterally    - Sensory: Intact throughout to light touch. Proprioception intact at the most distal LE.  - Coordination: Finger-nose-finger intact without ataxia or dysmetria.    - Gait: Slow, cautious, short steps. Otherwise grossly normal gait examination.    Labs:                        12.1   9.50  )-----------( 236      ( 12 Jun 2022 05:30 )             40.6     06-12    135  |  103  |  12  ----------------------------<  114<H>  4.6   |  20<L>  |  0.98    Ca    9.3      12 Jun 2022 05:30  Phos  2.7     06-12  Mg     2.40     06-12    TPro  7.7  /  Alb  3.9  /  TBili  <0.2  /  DBili  x   /  AST  21  /  ALT  15  /  AlkPhos  81  06-12    CAPILLARY BLOOD GLUCOSE      POCT Blood Glucose.: 119 mg/dL (12 Jun 2022 04:16)    LIVER FUNCTIONS - ( 12 Jun 2022 05:30 )  Alb: 3.9 g/dL / Pro: 7.7 g/dL / ALK PHOS: 81 U/L / ALT: 15 U/L / AST: 21 U/L / GGT: x              Radiology:  CT Head No Cont:  (12 Jun 2022 06:06)  FINDINGS:    No acute intracranial hemorrhage, mass effect, or cerebral edema. Mild   chronic white matter microvascular changes. The ventricles and cortical   sulci are within normal limits for age.    No abnormal extra-axial collection. No hydrocephalus.    The calvarium is intact. Visualized portions of the paranasal sinuses are   clear.    IMPRESSION:  No acute intracranial hemorrhage, mass effect, or cerebral edema.    If there is high clinical suspicion, MRI may be more sensitive for   detection of a discrete seizure nidus.      IR Neuro (10.18.21 @ 08:34)  IMPRESSION:  1. No angio architectural weakness. No proximal or distal aneurysms. No pial or dural vascular malformations.  2. Bilateral cervical carotid fibromuscular dysplasia.  3. No intracranial or extracranial atherosclerotic stenosis.  4. The superficial and deep pial veins and dural venous sinuses are widely patent and drain in normal sequence. No venous occlusion. No developmental venous anomaly.    Materials employed: 5 [employer] citizen microaccess set, 4 [employer] citizen sheath, 0.035 inch Bentson wire, 0.038 inch Terumo guidewire, 0.035 inch shapeable Terumo guidewire, 4 [employer] citizen vertebral diagnostic catheter.    Vessels studied:  Left vertebral artery x2  Right vertebral artery x1  Right internal carotid artery x3  Right external carotid artery x1  Left internal carotid artery x3  Left external carotid artery x1       MR Head w/wo IV Cont (10.14.21 @ 16:00)  IMPRESSION:  Left parietal parasagittal acute hematoma with surrounding edema in the distribution of the left PCA territory. No abnormal enhancement in association. No vascular hypertrophy

## 2022-06-12 NOTE — CONSULT NOTE ADULT - ATTENDING COMMENTS
Ms. Madera is a 58 yo woman with H/O primary intracerebral hemorrhage in 10/2021 with LOC and transient paresthesias in the left UE.   DDx: seizure, stroke.   I agree with work up and management as above.     Thank you Ms. Madera is a 56 yo woman with H/O primary intracerebral hemorrhage in 10/2021 with LOC and transient paresthesias in the left UE.   DDx: seizure, stroke.   I agree with work up and management as above.     Thank you    This is Dr. Gan's patient and our neurology resident will let him know that she is here.

## 2022-06-12 NOTE — PHYSICAL THERAPY INITIAL EVALUATION ADULT - DISCHARGE DISPOSITION, PT EVAL
anticipated discharge to home with home PT services to address current functional limitations to optimize safety within the home environment with the use of a rolling walker.

## 2022-06-12 NOTE — ED PROVIDER NOTE - PHYSICAL EXAMINATION
Attending/Radhames: NAD, head-atraumatic; PERRL/EOMI, non-icterus, supple, no cspine, no JVD, RRR, CTAB; Abd-soft, NT/ND, no HSM; no LE edema, A&Ox3, nonfocal; Skin-warm/dry Attending/Radhames: NAD, head-atraumatic; PERRL/EOMI, non-icterus, supple, no cspine, no JVD, RRR, CTAB; Abd-soft, NT/ND, no HSM; no LE edema, A&Ox3, CN II-XII intact, nonfocal; Skin-warm/dry

## 2022-06-12 NOTE — H&P ADULT - HISTORY OF PRESENT ILLNESS
Patient is a 56 Y/O Female with PMHx of CVA (Oct, 2021), HTN presenting with syncope. Pt reports after going to bed, finding herself on the floor with unilateral weakness and subsequently in bed though does not recall how. Pt is a poor historian, denies weakness, HA, lightheadedness.   	Pt self dc'd prescribed medications: Nifedipine 30mg, Lisinopril-HCTZ 20/25mg; Topiramate 25mg BID Patient is a 56 Y/O Female with PMHx of CVA (Oct, 2021), HTN presenting with syncope. Pt reports after going to bed, finding herself on the floor with unilateral weakness and unrination and subsequently in bed though does not recall how. Pt is a poor historian, denies weakness, HA, lightheadedness. Pt self dc'd prescribed medications: Nifedipine 30mg, Lisinopril-HCTZ 20/25mg; Topiramate 25mg BID  most of her symptoms have resolved in the hospital

## 2022-06-12 NOTE — ED PROVIDER NOTE - OBJECTIVE STATEMENT
Attending/Radhames: 58 yo F w/ h/o CVA (Oct, 2021) p/w syncope. Pt reports after going to bed, finding herself on the floor with unilateral weakness and subsequently in bed though does not recall how. Pt is a poor historian, denies weakness, HA, lightheadedness.   Pt self dc'd prescribed medications:  Nifedipine 30mg, Lisinopril-HCTZ 20/25mg; Topiramate 25mg BID

## 2022-06-12 NOTE — PHYSICAL THERAPY INITIAL EVALUATION ADULT - PERTINENT HX OF CURRENT PROBLEM, REHAB EVAL
Pt is a 57 year old female who presented to the hospital s/p syncope. PMH: CVA (2021), further PMH below

## 2022-06-12 NOTE — H&P ADULT - NSHPPHYSICALEXAM_GEN_ALL_CORE
Vital Signs Last 24 Hrs  T(C): 36.6 (12 Jun 2022 11:25), Max: 36.7 (12 Jun 2022 04:05)  T(F): 97.8 (12 Jun 2022 11:25), Max: 98.1 (12 Jun 2022 07:28)  HR: 56 (12 Jun 2022 11:25) (56 - 62)  BP: 140/88 (12 Jun 2022 11:25) (112/77 - 140/88)  BP(mean): --  RR: 18 (12 Jun 2022 11:25) (16 - 18)  SpO2: 100% (12 Jun 2022 11:25) (100% - 100%)    Appearance: Normal	  HEENT:   Normal oral mucosa, PERRL, EOMI	  Lymphatic: No lymphadenopathy , no edema  Cardiovascular: Normal S1 S2, No JVD, No murmurs , Peripheral pulses palpable 2+ bilaterally  Respiratory: Lungs clear to auscultation, normal effort 	  Gastrointestinal:  Soft, Non-tender, + BS	  Skin: No rashes, No ecchymoses, No cyanosis, warm to touch  Musculoskeletal: Normal range of motion, normal strength  Psychiatry:  Mood & affect appropriate  Ext: No edema

## 2022-06-12 NOTE — PHYSICAL THERAPY INITIAL EVALUATION ADULT - LEVEL OF INDEPENDENCE: STAIR NEGOTIATION, REHAB EVAL
deferred due to pt presenting with hip discomfort and occasional complaints of lightheadedness/unable to perform

## 2022-06-12 NOTE — PHYSICAL THERAPY INITIAL EVALUATION ADULT - PATIENT PROFILE REVIEW, REHAB EVAL
yes PT orders received, chart reviewed. ACTIVITY: no formal orders Diamond BLOUNT, endorsed pt to PT initial evaluation. Pt willing and able to participate in therapy session./yes

## 2022-06-12 NOTE — CONSULT NOTE ADULT - SUBJECTIVE AND OBJECTIVE BOX
DATE OF SERVICE: 06-12-22     CHIEF COMPLAINT:Patient is a 57y old  Female who presents with a chief complaint of     HISTORY OF PRESENT ILLNESS:HPI:  Patient is a 56 Y/O Female with PMHx of CVA (Oct, 2021), HTN presenting with syncope. Pt reports after going to bed, finding herself on the floor with unilateral weakness and unrination and subsequently in bed though does not recall how. Pt is a poor historian, denies weakness, HA, lightheadedness. Pt self dc'd prescribed medications: Nifedipine 30mg, Lisinopril-HCTZ 20/25mg; Topiramate 25mg BID  most of her symptoms have resolved in the hospital  (12 Jun 2022 11:39)      PAST MEDICAL & SURGICAL HISTORY:  CVA (cerebrovascular accident)      No significant past surgical history          MEDICATIONS:  heparin   Injectable 5000 Unit(s) SubCutaneous every 8 hours  NIFEdipine XL 30 milliGRAM(s) Oral daily    topiramate 25 milliGRAM(s) Oral two times a day            FAMILY HISTORY:  FH: hypertension (Father)        Non-contributory    SOCIAL HISTORY:    [ ] not a smoker    Allergies    Motrin (Unknown)    Intolerances    	    REVIEW OF SYSTEMS:  CONSTITUTIONAL: No fever  EYES: No eye pain, visual disturbances, or discharge  ENMT:  No difficulty hearing, tinnitus  NECK: No pain or stiffness  RESPIRATORY: No cough, wheezing,  CARDIOVASCULAR: No chest pain, palpitations, dizziness, or leg swelling, +syncope  GASTROINTESTINAL:  No nausea, vomiting, diarrhea or constipation. No melena.  GENITOURINARY: No dysuria, hematuria  NEUROLOGICAL: No stroke like symptoms  SKIN: No burning or lesions   ENDOCRINE: No heat or cold intolerance  MUSCULOSKELETAL: No joint pain or swelling  PSYCHIATRIC: No  anxiety, mood swings  HEME/LYMPH: No bleeding gums  ALLERGY AND IMMUNOLOGIC: No hives or eczema	    All other ROS negative    PHYSICAL EXAM:  T(C): 36.6 (06-12-22 @ 11:25), Max: 36.7 (06-12-22 @ 04:05)  HR: 56 (06-12-22 @ 11:25) (56 - 62)  BP: 140/88 (06-12-22 @ 11:25) (112/77 - 140/88)  RR: 18 (06-12-22 @ 11:25) (16 - 18)  SpO2: 100% (06-12-22 @ 11:25) (100% - 100%)  Wt(kg): --  I&O's Summary      Appearance: Normal	  HEENT:   Normal oral mucosa, EOMI	  Cardiovascular:  S1 S2, No JVD,    Respiratory: Lungs clear to auscultation	  Psychiatry: Alert  Gastrointestinal:  Soft, Non-tender, + BS	  Skin: No rashes   Neurologic: Non-focal  Extremities:  No edema  Vascular: Peripheral pulses palpable    	    	  	  CARDIAC MARKERS:  Labs personally reviewed by me                                  12.1   9.50  )-----------( 236      ( 12 Jun 2022 05:30 )             40.6     06-12    135  |  103  |  12  ----------------------------<  114<H>  4.6   |  20<L>  |  0.98    Ca    9.3      12 Jun 2022 05:30  Phos  2.7     06-12  Mg     2.40     06-12    TPro  7.7  /  Alb  3.9  /  TBili  <0.2  /  DBili  x   /  AST  21  /  ALT  15  /  AlkPhos  81  06-12          EKG: Personally reviewed by me - NSR  Radiology: Personally reviewed by me - CXR clear lungs      Assessment:  · Assessment	  Patient is a 56 Y/O Female with PMHx of CVA (Oct, 2021), HTN presenting with syncope. Pt reports after going to bed, finding herself on the floor with unilateral weakness and urination and subsequently in bed though does not recall how. Pt is a poor historian, denies weakness, HA, lightheadedness. Pt self dc'd prescribed medications: Nifedipine 30mg, Lisinopril-HCTZ 20/25mg; Topiramate 25mg BID  - most of her symptoms have resolved in the hospital       1. Unwitnessed syncope   - check orthostatics   - likely precipitated by intermittent fasting, she reports starting intermittent fasting 11 days ago.   - she fasts for up to 17 hours at a time  - less likely occult arrythmia, monitor on tele  - TTE   - neuro recs appreciated,  R/O Seizure, vs TIA,   - Patient self D/C all her home meds  - Resumed Topamax for now, may change since patient states that it causes weakness   - MRI brain w/wo contrast  - monitor electrolytes  - fall precautions  - PT eval     2. HTN   resume nifedipine and adjust as tolerated  Check Echo       3. DVT and GI PPX           Differential diagnosis and plan of care discussed with patient after the evaluation. Counseling on diet, nutritional counseling, weight management, exercise and medication compliance was done.   Advanced care planning/advanced directives discussed with patient/family. DNR status including forceful chest compressions to attempt to restart the heart, ventilator support/artificial breathing, electric shock, artificial nutrition, health care proxy, Molst form all discussed with pt. Pt wishes to consider. More than fifteen minutes spent on discussing advanced directives. OMT on six regions for acute somatic dysfunctions done at the bedside.  One hundred ten minutes spent on encounter, of which more than fifty percent of the encounter was spent counseling and/or coordinating care by the attending physician        Oliverio Nelson DO [facility]  Cardiovascular Medicine  [address] [place] [address], [address]  Office [phone]  Cell [phone]

## 2022-06-12 NOTE — PHYSICAL THERAPY INITIAL EVALUATION ADULT - ADDITIONAL COMMENTS
Pt lives alone in an apartment with 6 stairs to enter, +elevator access to apartment. prior to admission pt was independent with all mobility and ambulated without an assistive device. Pt owns a rolling walker. Pt reports 1 fall in the last year. Pt stated she mostly resides in her home and only leaves for doctor's appointments.     Pt. left comfortable in bed with all tubes/lines intact, call bell in reach and in NAD.

## 2022-06-12 NOTE — PHYSICAL THERAPY INITIAL EVALUATION ADULT - PHYSICAL ASSIST/NONPHYSICAL ASSIST: SUPINE/SIT, REHAB EVAL
upon sitting edge of bed, pt endorsed mild dizziness, attempted to take BP, however BP did not read, symptoms resolved with seated rest and bilateral LE exercises

## 2022-06-12 NOTE — PATIENT PROFILE ADULT - FALL HARM RISK - HARM RISK INTERVENTIONS
Assistance with ambulation/Assistance OOB with selected safe patient handling equipment/Communicate Risk of Fall with Harm to all staff/Discuss with provider need for PT consult/Monitor gait and stability/Reinforce activity limits and safety measures with patient and family/Sit up slowly, dangle for a short time, stand at bedside before walking/Tailored Fall Risk Interventions/Visual Cue: Yellow wristband and red socks/Bed in lowest position, wheels locked, appropriate side rails in place/Call bell, personal items and telephone in reach/Instruct patient to call for assistance before getting out of bed or chair/Non-slip footwear when patient is out of bed/[place] to call system/Physically safe environment - no spills, clutter or unnecessary equipment/Purposeful Proactive Rounding/Room/bathroom lighting operational, light cord in reach

## 2022-06-12 NOTE — ED ADULT TRIAGE NOTE - CHIEF COMPLAINT QUOTE
Pt st" Sometime before midnight not really sure I passed out....I woke up around 2 am on floor...I urinated on self....I remember I had headache yesterday still have headache. I had a brain bleed in oct....I had memory loss after that." Pt st" Sometime before midnight not really sure I passed out....I woke up around 2 am on floor...I urinated on self....I remember I had headache yesterday still have headache dizzy..... I had a brain bleed in oct....I had memory loss after that." " I was suppose to do sleep study because not sure if I am having siezures."

## 2022-06-13 LAB
A1C WITH ESTIMATED AVERAGE GLUCOSE RESULT: 5.6 % — SIGNIFICANT CHANGE UP (ref 4–5.6)
ALBUMIN SERPL ELPH-MCNC: 3.8 G/DL — SIGNIFICANT CHANGE UP (ref 3.3–5)
ALP SERPL-CCNC: 80 U/L — SIGNIFICANT CHANGE UP (ref 40–120)
ALT FLD-CCNC: 13 U/L — SIGNIFICANT CHANGE UP (ref 4–33)
ANION GAP SERPL CALC-SCNC: 11 MMOL/L — SIGNIFICANT CHANGE UP (ref 7–14)
AST SERPL-CCNC: 16 U/L — SIGNIFICANT CHANGE UP (ref 4–32)
BASOPHILS # BLD AUTO: 0.01 K/UL — SIGNIFICANT CHANGE UP (ref 0–0.2)
BASOPHILS NFR BLD AUTO: 0.2 % — SIGNIFICANT CHANGE UP (ref 0–2)
BILIRUB SERPL-MCNC: <0.2 MG/DL — SIGNIFICANT CHANGE UP (ref 0.2–1.2)
BUN SERPL-MCNC: 16 MG/DL — SIGNIFICANT CHANGE UP (ref 7–23)
CALCIUM SERPL-MCNC: 8.7 MG/DL — SIGNIFICANT CHANGE UP (ref 8.4–10.5)
CHLORIDE SERPL-SCNC: 108 MMOL/L — HIGH (ref 98–107)
CHOLEST SERPL-MCNC: 124 MG/DL — SIGNIFICANT CHANGE UP
CO2 SERPL-SCNC: 21 MMOL/L — LOW (ref 22–31)
CREAT SERPL-MCNC: 1.03 MG/DL — SIGNIFICANT CHANGE UP (ref 0.5–1.3)
CULTURE RESULTS: SIGNIFICANT CHANGE UP
EGFR: 64 ML/MIN/1.73M2 — SIGNIFICANT CHANGE UP
EOSINOPHIL # BLD AUTO: 0.05 K/UL — SIGNIFICANT CHANGE UP (ref 0–0.5)
EOSINOPHIL NFR BLD AUTO: 0.9 % — SIGNIFICANT CHANGE UP (ref 0–6)
ESTIMATED AVERAGE GLUCOSE: 114 — SIGNIFICANT CHANGE UP
GLUCOSE SERPL-MCNC: 104 MG/DL — HIGH (ref 70–99)
HCT VFR BLD CALC: 38.2 % — SIGNIFICANT CHANGE UP (ref 34.5–45)
HDLC SERPL-MCNC: 44 MG/DL — LOW
HGB BLD-MCNC: 11.4 G/DL — LOW (ref 11.5–15.5)
IANC: 2.51 K/UL — SIGNIFICANT CHANGE UP (ref 1.8–7.4)
IMM GRANULOCYTES NFR BLD AUTO: 0.2 % — SIGNIFICANT CHANGE UP (ref 0–1.5)
LIPID PNL WITH DIRECT LDL SERPL: 59 MG/DL — SIGNIFICANT CHANGE UP
LYMPHOCYTES # BLD AUTO: 2.64 K/UL — SIGNIFICANT CHANGE UP (ref 1–3.3)
LYMPHOCYTES # BLD AUTO: 47.2 % — HIGH (ref 13–44)
MAGNESIUM SERPL-MCNC: 2.1 MG/DL — SIGNIFICANT CHANGE UP (ref 1.6–2.6)
MCHC RBC-ENTMCNC: 25.6 PG — LOW (ref 27–34)
MCHC RBC-ENTMCNC: 29.8 GM/DL — LOW (ref 32–36)
MCV RBC AUTO: 85.8 FL — SIGNIFICANT CHANGE UP (ref 80–100)
MONOCYTES # BLD AUTO: 0.37 K/UL — SIGNIFICANT CHANGE UP (ref 0–0.9)
MONOCYTES NFR BLD AUTO: 6.6 % — SIGNIFICANT CHANGE UP (ref 2–14)
NEUTROPHILS # BLD AUTO: 2.51 K/UL — SIGNIFICANT CHANGE UP (ref 1.8–7.4)
NEUTROPHILS NFR BLD AUTO: 44.9 % — SIGNIFICANT CHANGE UP (ref 43–77)
NON HDL CHOLESTEROL: 80 MG/DL — SIGNIFICANT CHANGE UP
NRBC # BLD: 0 /100 WBCS — SIGNIFICANT CHANGE UP
NRBC # FLD: 0 K/UL — SIGNIFICANT CHANGE UP
PHOSPHATE SERPL-MCNC: 3.3 MG/DL — SIGNIFICANT CHANGE UP (ref 2.5–4.5)
PLATELET # BLD AUTO: 223 K/UL — SIGNIFICANT CHANGE UP (ref 150–400)
POTASSIUM SERPL-MCNC: 4 MMOL/L — SIGNIFICANT CHANGE UP (ref 3.5–5.3)
POTASSIUM SERPL-SCNC: 4 MMOL/L — SIGNIFICANT CHANGE UP (ref 3.5–5.3)
PROT SERPL-MCNC: 7.2 G/DL — SIGNIFICANT CHANGE UP (ref 6–8.3)
RBC # BLD: 4.45 M/UL — SIGNIFICANT CHANGE UP (ref 3.8–5.2)
RBC # FLD: 15.6 % — HIGH (ref 10.3–14.5)
SODIUM SERPL-SCNC: 140 MMOL/L — SIGNIFICANT CHANGE UP (ref 135–145)
SPECIMEN SOURCE: SIGNIFICANT CHANGE UP
TRIGL SERPL-MCNC: 104 MG/DL — SIGNIFICANT CHANGE UP
TSH SERPL-MCNC: 1.55 UIU/ML — SIGNIFICANT CHANGE UP (ref 0.27–4.2)
WBC # BLD: 5.59 K/UL — SIGNIFICANT CHANGE UP (ref 3.8–10.5)
WBC # FLD AUTO: 5.59 K/UL — SIGNIFICANT CHANGE UP (ref 3.8–10.5)

## 2022-06-13 PROCEDURE — 70553 MRI BRAIN STEM W/O & W/DYE: CPT | Mod: 26

## 2022-06-13 PROCEDURE — 95720 EEG PHY/QHP EA INCR W/VEEG: CPT

## 2022-06-13 PROCEDURE — 99233 SBSQ HOSP IP/OBS HIGH 50: CPT

## 2022-06-13 RX ORDER — ACETAMINOPHEN 500 MG
650 TABLET ORAL ONCE
Refills: 0 | Status: COMPLETED | OUTPATIENT
Start: 2022-06-13 | End: 2022-06-13

## 2022-06-13 RX ADMIN — HEPARIN SODIUM 5000 UNIT(S): 5000 INJECTION INTRAVENOUS; SUBCUTANEOUS at 05:55

## 2022-06-13 RX ADMIN — Medication 25 MILLIGRAM(S): at 05:55

## 2022-06-13 RX ADMIN — HEPARIN SODIUM 5000 UNIT(S): 5000 INJECTION INTRAVENOUS; SUBCUTANEOUS at 13:39

## 2022-06-13 RX ADMIN — Medication 650 MILLIGRAM(S): at 01:16

## 2022-06-13 RX ADMIN — Medication 30 MILLIGRAM(S): at 05:55

## 2022-06-13 RX ADMIN — Medication 650 MILLIGRAM(S): at 00:16

## 2022-06-13 RX ADMIN — Medication 25 MILLIGRAM(S): at 18:08

## 2022-06-13 RX ADMIN — HEPARIN SODIUM 5000 UNIT(S): 5000 INJECTION INTRAVENOUS; SUBCUTANEOUS at 21:36

## 2022-06-13 RX ADMIN — Medication 650 MILLIGRAM(S): at 19:30

## 2022-06-13 NOTE — CHART NOTE - NSCHARTNOTEFT_GEN_A_CORE
PRELIMINARY EEG REVIEW    EEG reviewed to 16:04  Date: 06-13-22    - No epileptiform pattern or seizure seen.    This Preliminary report is based on fellow review. Final report pending Completion of study tomorrow morning and following attending review.    Reading Room: [phone]  On Call Service After Hours: [phone]    David Chance MD PGY-5  Epilepsy Fellow

## 2022-06-13 NOTE — CONSULT NOTE ADULT - SUBJECTIVE AND OBJECTIVE BOX
Source: patient and Chart    HPI:  This is a 57 year old woman with a pmhx of intracerebral hemorrhage (10/2021) and HTN who presented to the ED with after an unwitnessed syncopal event.    According to the patient, she was in bed, stood up and walked to her bathroom at home. While ambulating to the bathroom, she felt weak and  loss of consciousness for unclear duration. She remembers waking up on the floor and noticing arms numbness and that her whole body was shaking. It is less clear, but somehow patient was subsequently in bed, but does not recall how she made it to bed. Patient denied fever, chills, diaphoresis, HA, lightheadedness, dizziness, changes in visions, cold like symptoms  (sore throat cough, conjunctivitis runny nose), CP, palpitation, SOB, abdominal pain, n/v/d, hematuria, melena, and hematochezia. Per notes, patient slef d/c home medication such as Nifedipine 30mg, Lisinopril-HCTZ 20/25mg; Topiramate 25mg BID.    ED course was significant for T= 98F, HR 60BPM, /70mmHg, RR18 /min spo2 100 % on RA. Labs were significant for Na 135, K 4.6, BUN 12, sCr 0.98, Mg 2.4, LFT WLN, WBC 9.50, Hgb 12.1, HCT 40.6,  and covid-19 negative.  CT head showed no acute intracranial hemorrhage, mass effect, or cerebral edema. CXR with clear lungs. Neurology was consulted and MRI which showed no hydrocephalus, mass effect, acute intracranial hemorrhage, vasogenic  edema, or acute territorial infarct; and 24 hours vEEg which the patient is currently on. Cardiology was consulted for syncope and recommended orthostatic vital, TTE and resuming patient's topamax. EP was consult for ILR given hx of CVA and syncope. Patient denied having a hx of atrial arrythmia such as Afib or bradycardia She stated that she does not have a cardiologist. There is no telemetry evidence of atrial arrhythmias; nor is there a clear pacing indication at this time. Patient will benefit from prolonged monitoring for detection of bradycardiac and tachycardiac arrhythmias such as AF/PAF as cause of potential cardioembolic source in the past and syncope on this admission. The risks and benefits were explained in detail which included but not limited to bleeding, infection, stroke, syncope 2/2 vasovagal, intubation, and death. Patient expressed understanding and all questions were answered. Patient is currently refusing ILR but may consider it in the future. Patient was given Dr. Arben arriaga.       PAST MEDICAL & SURGICAL HISTORY:  CVA (cerebrovascular accident)    No significant past surgical history  MEDICATIONS  (STANDING):  heparin   Injectable 5000 Unit(s) SubCutaneous every 8 hours  NIFEdipine XL 30 milliGRAM(s) Oral daily  topiramate 25 milliGRAM(s) Oral two times a day    MEDICATIONS  (PRN):    FAMILY HISTORY:  Father hx of HTN     SOCIAL HISTORY:  LIVING SITUATION: Lives alone  CIGARETTES: Denied  ALCOHOL: denied   ILLICIT DRUG USES: denied    REVIEW OF SYSTEMS:  CONSTITUTIONAL: No fever, weight loss, chills, or shakes; admits to weakness   RESPIRATORY: per HPI  CARDIOVASCULAR: per HPI  GASTROINTESTINAL: No abdominal  or epigastric pain, nausea, vomiting, hematemesis, diarrhea, constipation, melena or bright red blood.  GENITOURINARY: No dysuria, nocturia, hematuria, or urinary incontinence  NEUROLOGICAL: per HPI  MUSCULOSKELETAL: No joint pain or swelling, muscle, back, or extremity pain    Vital Signs Last 24 Hrs  T(C): 36.7 (2022 05:50), Max: 36.7 (2022 22:00)  T(F): 98 (2022 05:50), Max: 98 (2022 22:00)  HR: 67 (2022 05:50) (67 - 71)  BP: 122/69 (2022 05:50) (122/69 - 130/67)  BP(mean): --  RR: 17 (2022 05:50) (17 - 17)  SpO2: 100% (2022 05:50) (98% - 100%)    PHYSICAL EXAM:  GENERAL: Well appearing, speaking in full sentence, in NAD  HEART: S1S2 RRR  PULMONARY:CTABL, normal respiratory effort.   ABDOMEN: Round contour, Bowel sounds present, soft  EXTREMITIES:  Warm, well -perfused, no pedal edema, distal pulses present  NEUROLOGICAL:AOx3    INTERPRETATION OF TELEMETRY: Sr HR 60-70s     ECG: Sr HR 57 normal axis  on 2022    I&O's Detail      LABS:                        11.4   5.59  )-----------( 223      ( 2022 05:57 )             38.2     06-    140  |  108<H>  |  16  ----------------------------<  104<H>  4.0   |  21<L>  |  1.03    Ca    8.7      2022 05:57  Phos  3.3       Mg     2.10         TPro  7.2  /  Alb  3.8  /  TBili  <0.2  /  DBili  x   /  AST  16  /  ALT  13  /  AlkPhos  80  -          Urinalysis Basic - ( 2022 05:35 )    Color: Light Yellow / Appearance: Clear / S.021 / pH: x  Gluc: x / Ketone: Negative  / Bili: Negative / Urobili: <2 mg/dL   Blood: x / Protein: Negative / Nitrite: Negative   Leuk Esterase: Negative / RBC: 1 /HPF / WBC 3 /HPF   Sq Epi: x / Non Sq Epi: 6 /HPF / Bacteria: x      BNP  I&O's Detail    Daily     Daily     RADIOLOGY & ADDITIONAL STUDIES:  < from: CT Head No Cont (22 @ 06:06) >    IMPRESSION:  No acute intracranial hemorrhage, mass effect, or cerebral edema.    If there is high clinical suspicion, MRI may be more sensitive for   detection of a discrete seizure nidus.    --- End of Report ---    < end of copied text >    < from: MR Head w/wo IV Cont (22 @ 10:07) >  IMPRESSION:    No hydrocephalus, mass effect, acute intracranial hemorrhage, vasogenic   edema, or acute territorial infarct.    Susceptibility artifact and minimal gliosis in the left posteromedial   parietal lobe, consistent with chronic hemorrhage.    No abnormal parenchymal or leptomeningeal enhancement.    < end of copied text >

## 2022-06-13 NOTE — CONSULT NOTE ADULT - ASSESSMENT
This is a 57 year old woman with a pmhx of intracerebral hemorrhage (10/2021) and HTN who presented to the ED for an unwitnessed syncopal event. CT head showed no acute intracranial hemorrhage, mass effect, or cerebral edema and MRI which showed no hydrocephalus, mass effect, acute intracranial hemorrhage, vasogenic  edema, or acute territorial infarct. EP was consult for ILR given hx of CVA and syncope. Patient will benefit from prolonged monitoring for detection of bradycardiac and tachycardiac arrhythmias such as AF/PAF as cause of potential cardioembolic source in the past and syncope on this admission. The risks and benefits were explained in detail which included but not limited to bleeding, infection, stroke, syncope 2/2 vasovagal, intubation, and death. Patient expressed understanding and all questions were answered. Patient is currently refusing ILR but may consider it in the future. Patient was given Dr. Arben arriaga.     Plan:  - Continuous telemetric monitoring  - Monitor electrolytes and replete K to 4 and Mg to 2  - F/u TTE  - F/U vEEG report  - Appreciated cardiology and neurology recs   - Continue care per primary team    Jse Beal PA-C  Patient to be staffed with attending. Please await attending addendum
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 Pt reports after going to bed, finding herself on the floor with unilateral weakness and unrination and subsequently in bed though does not recall how. Pt is a poor historian, denies weakness, HA, lightheadedness. Pt self dc'd prescribed medications: Nifedipine 30mg, Lisinopril-HCTZ 20/25mg; Topiramate 25mg BID.     Impression: 1) prior L parasagiatal IPH liekly hypertensive (angio neg) 2) syncope vs seizure  now back to baseline  - MRI brain w/ and w/o  - vEEG  - if any abnormalities on EEG would start AED as this is convincing for seizure like event, albeit 1st event.   - NO [place]. [place] law given [place]  - r/o infection  - tele/TTE  - Hemoglobin A1c and lipid panel  - PT/OT, OOBC  - BP normotensive   - check FS, glucose control <180  - GI/DVT ppx  - Counseling on diet, exercise, and medication adherence was done  - Counseling on smoking cessation and alcohol consumption offered when appropriate.  - Pain assessed and judicious use of narcotics when appropriate was discussed.    - Stroke education given when appropriate.  - Importance of fall prevention discussed.   - Differential diagnosis and plan of care discussed with patient and/or family and primary team  - Thank you for allowing me to participate in the care of this patient. Call with questions.   Alex Gan MD  Vascular Neurology  Office: [phone] 
56 yo RH-F w/ PMHx HTN and hemorrhagic stroke (L parietal IPH up to 3.7 cm in 10/2021, negative cerebral angiogram 10/18/21, residual memory deficits) who presents to [facility] ED after LOC at home. Patient ambulating to bathroom, felt light-headed, f/b LOC, awakening on the floor after unclear duration w/ LUE numbness and whole body shaking. Patient then woke up in bed w/o recollection of how she made it from the floor to her bed. Unclear h/o seizures -- patient reporting transient whole body stiffening in the past. She reports taking topiramate 25mg BID for seizures, which seems unlikely given this dose. In any event, patient unilaterally discontinued her topiramate in 4/2022. Initial VS in ED: /70, HR 60, afebrile (temporal). Benign neurological examination -- marginally impaired fund of knowledge, hyperreflexic at patellars bilaterally. CTH 6/12/22: no acute pathology, mild chronic white mater microvascular changes.    Impression: Episode of LOC followed by LUE numbness and whole body shaking with retained awareness. Unclear etiology. Transient LUE numbness/weakness possibly d/t nerve compression in LUE (resolved) while lying on floor for unclear duration. Whole body shaking w/ retained awareness is unusual and is inconsistent with generalized seizure. Overall, suspect convulsive syncope vs epileptic seizure.    Plan:   [] Video EEG for 24 hours  [] MRI brain w/wo contrast  [] Orthostatics   [x] UA: negative  [x] Lactate: 1.8  [] No indication for antiepileptic drugs at this time  [] IV lorazepam 2mg IV prn for seizure activity lasting >3-5mins, >2x/hr, >3x/day, or if GTC , repeat after 1 minute (max dose 0.1 mg/kg)     [] Neurological checks Q4H  [] Seizure, fall and aspiration precautions. Avoid sleep deprivation.      Neurology Spectra [id number].    * Plan discussed with Neurology Attending Dr. Bardales *

## 2022-06-13 NOTE — CONSULT NOTE ADULT - SUBJECTIVE AND OBJECTIVE BOX
Neurology Consult    Reason for Consult: Patient is a 57y old  Female who presents with a chief complaint of Syncope (2022 15:03)      HPI:  Patient is a 58 Y/O Female with PMHx of CVA (Oct, 2021), HTN presenting with syncope. Pt reports after going to bed, finding herself on the floor with unilateral weakness and unrination and subsequently in bed though does not recall how. Pt is a poor historian, denies weakness, HA, lightheadedness. Pt self dc'd prescribed medications: Nifedipine 30mg, Lisinopril-HCTZ 20/25mg; Topiramate 25mg BID  most of her symptoms have resolved in the hospital  (2022 11:39)       PAST MEDICAL & SURGICAL HISTORY:  CVA (cerebrovascular accident)      No significant past surgical history          Allergies: Allergies    Motrin (Unknown)    Intolerances        Social History: Denies toxic habits including tobacco, ETOH or illicit drugs.    Family History: FAMILY HISTORY:  FH: hypertension (Father)    . No family history of strokes    Medications: MEDICATIONS  (STANDING):  heparin   Injectable 5000 Unit(s) SubCutaneous every 8 hours  NIFEdipine XL 30 milliGRAM(s) Oral daily  topiramate 25 milliGRAM(s) Oral two times a day    MEDICATIONS  (PRN):      Review of Systems:  CONSTITUTIONAL:  No weight loss, fever, chills, weakness or fatigue.  HEENT:  Eyes:  No visual loss, blurred vision, double vision or yellow sclera. Ears, Nose, Throat:  No hearing loss, sneezing, congestion, runny nose or sore throat.  SKIN:  No rash or itching.  CARDIOVASCULAR:  No chest pain, chest pressure or chest discomfort. No palpitations or edema.  RESPIRATORY:  No shortness of breath, cough or sputum.  GASTROINTESTINAL:  No anorexia, nausea, vomiting or diarrhea. No abdominal pain or blood.  GENITOURINARY:  No burning on urination or incontinence   NEUROLOGICAL:  No headache, dizziness, +syncope, + paralysis, no ataxia, numbness or tingling in the extremities. No change in bowel or bladder control. no limb weakness. no vision changes.   MUSCULOSKELETAL:  No muscle, back pain, joint pain or stiffness.  HEMATOLOGIC:  No anemia, bleeding or bruising.  LYMPHATICS:  No enlarged nodes. No history of splenectomy.  PSYCHIATRIC:  No history of depression or anxiety.  ENDOCRINOLOGIC:  No reports of sweating, cold or heat intolerance. No polyuria or polydipsia.      Vitals:  Vital Signs Last 24 Hrs  T(C): 36.7 (2022 05:50), Max: 36.7 (2022 22:00)  T(F): 98 (2022 05:50), Max: 98 (2022 22:00)  HR: 67 (2022 05:50) (56 - 71)  BP: 122/69 (2022 05:50) (112/77 - 140/88)  BP(mean): --  RR: 17 (2022 05:50) (16 - 18)  SpO2: 100% (2022 05:50) (98% - 100%)    General Exam:   General Appearance: Appropriately dressed and in no acute distress       Head: Normocephalic, atraumatic and no dysmorphic features  Ear, Nose, and Throat: Moist mucous membranes  CVS: S1S2+  Resp: No SOB, no wheeze or rhonchi  GI: soft NT/ND  Extremities: No edema or cyanosis  Skin: No bruises or rashes     Neurological Exam:  Mental Status: Awake, alert and oriented x 3.  Able to follow simple and complex verbal commands. Able to name and repeat. fluent speech. No obvious aphasia or dysarthria noted.   Cranial Nerves: PERRL, EOMI, VFFC, sensation V1-V3 intact,  no obvious facial asymmetry, equal elevation of palate, scm/trap 5/5, tongue is midline on protrusion. no obvious papilledema on fundoscopic exam. hearing is grossly intact.   Motor: Normal bulk, tone and strength throughout. Fine finger movements were intact and symmetric. no tremors or drift noted.    Sensation: Intact to light touch and pinprick throughout. no right/left confusion. no extinction to tactile on DSS. Romberg was negative.   Reflexes: 1+ throughout at biceps, brachioradialis, triceps, patellars and ankles bilaterally and equal. No clonus. R toe and L toe were both downgoing.  Coordination: No dysmetria on FNF or HKS  Gait: deferred     Data/Labs/Imaging which I personally reviewed.     Labs:     CBC Full  -  ( 2022 05:57 )  WBC Count : 5.59 K/uL  RBC Count : 4.45 M/uL  Hemoglobin : 11.4 g/dL  Hematocrit : 38.2 %  Platelet Count - Automated : 223 K/uL  Mean Cell Volume : 85.8 fL  Mean Cell Hemoglobin : 25.6 pg  Mean Cell Hemoglobin Concentration : 29.8 gm/dL  Auto Neutrophil # : 2.51 K/uL  Auto Lymphocyte # : 2.64 K/uL  Auto Monocyte # : 0.37 K/uL  Auto Eosinophil # : 0.05 K/uL  Auto Basophil # : 0.01 K/uL  Auto Neutrophil % : 44.9 %  Auto Lymphocyte % : 47.2 %  Auto Monocyte % : 6.6 %  Auto Eosinophil % : 0.9 %  Auto Basophil % : 0.2 %    -    140  |  108<H>  |  16  ----------------------------<  104<H>  4.0   |  21<L>  |  1.03    Ca    8.7      2022 05:57  Phos  2.7     06-  Mg     2.10     -    TPro  7.2  /  Alb  3.8  /  TBili  <0.2  /  DBili  x   /  AST  16  /  ALT  13  /  AlkPhos  80  06-13    LIVER FUNCTIONS - ( 2022 05:57 )  Alb: 3.8 g/dL / Pro: 7.2 g/dL / ALK PHOS: 80 U/L / ALT: 13 U/L / AST: 16 U/L / GGT: x             Urinalysis Basic - ( 2022 05:35 )    Color: Light Yellow / Appearance: Clear / S.021 / pH: x  Gluc: x / Ketone: Negative  / Bili: Negative / Urobili: <2 mg/dL   Blood: x / Protein: Negative / Nitrite: Negative   Leuk Esterase: Negative / RBC: 1 /HPF / WBC 3 /HPF   Sq Epi: x / Non Sq Epi: 6 /HPF / Bacteria: x      Prior Workup         IR Neuro (10.18. @ 08:34)  IMPRESSION:  1. No angio architectural weakness. No proximal or distal aneurysms. No pial or dural vascular malformations.  2. Bilateral cervical carotid fibromuscular dysplasia.  3. No intracranial or extracranial atherosclerotic stenosis.  4. The superficial and deep pial veins and dural venous sinuses are widely patent and drain in normal sequence. No venous occlusion. No developmental venous anomaly.    Materials employed: 5 Ethiopian microaccess set, 4 Ethiopian sheath, 0.035 inch Bentson wire, 0.038 inch Terumo guidewire, 0.035 inch shapeable Terumo guidewire, 4 Ethiopian vertebral diagnostic catheter.    Vessels studied:  Left vertebral artery x2  Right vertebral artery x1  Right internal carotid artery x3  Right external carotid artery x1  Left internal carotid artery x3  Left external carotid artery x1      CTH No cont (10/16/21):  Compared to previous studies, stable intraparenchymal hemorrhage in the left parietal lobule with mild increase in ashley-hemorrhagic edema, consistent with expected evolution of hemorrhage.No new hemorrhage or extension of the hemorrhage in the ventricles.    CT CHEST IC, CT ABDOMEN AND PELVIS OC IC (10/16/21):  No evidence of suspicious mass or lymphadenopathy in the chest, abdomen, or pelvis.         (10/13/21) @[facility]):   CT brain: There is an intraparenchymal hemorrhage in the left parietal lobule with perihemorrhagic edema as described. There is no extension of the hemorrhage into the ventricles, midline shift or herniation. There is no acute lobar infarction.  CTA brain: There is no large vessel occlusion or aneurysm involving major proximal intracranial arteries. There is no dominant arteriovenous malformation.  CTA NECK: There is no stenosis involving major neck arteries.  CT venogram: There is no high-grade stenosis or occlusion within the dural venous sinuses.    MR Head w/wo IV Cont (10.14.21 @ [facility]):  Left parietal parasagittal acute hematoma with surrounding edema in the distribution of the left PCA territory. No abnormal enhancement in association. No vascular hypertrophy        ACC: [id number] EXAM:  CT BRAIN                          PROCEDURE DATE:  2022          INTERPRETATION:  INDICATIONS:  Seizure.    TECHNIQUE:  Serial axial images were obtained from the skull base to the   vertex without intravenous contrast. Sagittal and coronal reformats were   performed.    COMPARISON EXAMINATION: Head CT dated 10/16/2021. MRI dated 10/14/2021.    FINDINGS:    No acute intracranial hemorrhage, mass effect, or cerebral edema. Mild   chronic white matter microvascular changes. The ventricles and cortical   sulci are within normal limits for age.    No abnormal extra-axial collection. No hydrocephalus.    The calvarium is intact. Visualized portions of the paranasal sinuses are   clear.    IMPRESSION:  No acute intracranial hemorrhage, mass effect, or cerebral edema.    If there is high clinical suspicion, MRI may be more sensitive for   detection of a discrete seizure nidus.    --- End of Report ---          ELADIA BURT MD; Resident Radiologist  This document has been electronically signed.  NADER PIERRE MD; Attending Radiologist  This document has been electronically signed. 2022  6:21AM    < end of copied text >

## 2022-06-13 NOTE — CONSULT NOTE ADULT - NS ATTEND AMEND GEN_ALL_CORE FT
57 year old woman with a pmhx of intracerebral hemorrhage (10/2021) and HTN who presented to the ED for an unwitnessed syncopal event. EP consulted for ILR placement. I discussed with her today the risks vs benefits. She would like to think about it. Will re-address tomorrow.

## 2022-06-14 PROBLEM — I63.9 CEREBRAL INFARCTION, UNSPECIFIED: Chronic | Status: ACTIVE | Noted: 2022-06-12

## 2022-06-14 LAB
ALBUMIN SERPL ELPH-MCNC: 3.6 G/DL — SIGNIFICANT CHANGE UP (ref 3.3–5)
ALP SERPL-CCNC: 84 U/L — SIGNIFICANT CHANGE UP (ref 40–120)
ALT FLD-CCNC: 14 U/L — SIGNIFICANT CHANGE UP (ref 4–33)
ANION GAP SERPL CALC-SCNC: 12 MMOL/L — SIGNIFICANT CHANGE UP (ref 7–14)
AST SERPL-CCNC: 18 U/L — SIGNIFICANT CHANGE UP (ref 4–32)
BILIRUB SERPL-MCNC: <0.2 MG/DL — SIGNIFICANT CHANGE UP (ref 0.2–1.2)
BUN SERPL-MCNC: 16 MG/DL — SIGNIFICANT CHANGE UP (ref 7–23)
CALCIUM SERPL-MCNC: 9 MG/DL — SIGNIFICANT CHANGE UP (ref 8.4–10.5)
CHLORIDE SERPL-SCNC: 108 MMOL/L — HIGH (ref 98–107)
CO2 SERPL-SCNC: 21 MMOL/L — LOW (ref 22–31)
CREAT SERPL-MCNC: 0.97 MG/DL — SIGNIFICANT CHANGE UP (ref 0.5–1.3)
EGFR: 69 ML/MIN/1.73M2 — SIGNIFICANT CHANGE UP
GLUCOSE SERPL-MCNC: 106 MG/DL — HIGH (ref 70–99)
HCT VFR BLD CALC: 39.6 % — SIGNIFICANT CHANGE UP (ref 34.5–45)
HGB BLD-MCNC: 11.9 G/DL — SIGNIFICANT CHANGE UP (ref 11.5–15.5)
MAGNESIUM SERPL-MCNC: 2.1 MG/DL — SIGNIFICANT CHANGE UP (ref 1.6–2.6)
MCHC RBC-ENTMCNC: 25.6 PG — LOW (ref 27–34)
MCHC RBC-ENTMCNC: 30.1 GM/DL — LOW (ref 32–36)
MCV RBC AUTO: 85.3 FL — SIGNIFICANT CHANGE UP (ref 80–100)
NRBC # BLD: 0 /100 WBCS — SIGNIFICANT CHANGE UP
NRBC # FLD: 0 K/UL — SIGNIFICANT CHANGE UP
PHOSPHATE SERPL-MCNC: 3.1 MG/DL — SIGNIFICANT CHANGE UP (ref 2.5–4.5)
PLATELET # BLD AUTO: 238 K/UL — SIGNIFICANT CHANGE UP (ref 150–400)
POTASSIUM SERPL-MCNC: 3.9 MMOL/L — SIGNIFICANT CHANGE UP (ref 3.5–5.3)
POTASSIUM SERPL-SCNC: 3.9 MMOL/L — SIGNIFICANT CHANGE UP (ref 3.5–5.3)
PROT SERPL-MCNC: 7.4 G/DL — SIGNIFICANT CHANGE UP (ref 6–8.3)
RBC # BLD: 4.64 M/UL — SIGNIFICANT CHANGE UP (ref 3.8–5.2)
RBC # FLD: 15.5 % — HIGH (ref 10.3–14.5)
SODIUM SERPL-SCNC: 141 MMOL/L — SIGNIFICANT CHANGE UP (ref 135–145)
WBC # BLD: 5.68 K/UL — SIGNIFICANT CHANGE UP (ref 3.8–10.5)
WBC # FLD AUTO: 5.68 K/UL — SIGNIFICANT CHANGE UP (ref 3.8–10.5)

## 2022-06-14 PROCEDURE — 99232 SBSQ HOSP IP/OBS MODERATE 35: CPT | Mod: 25

## 2022-06-14 PROCEDURE — 93306 TTE W/DOPPLER COMPLETE: CPT | Mod: 26

## 2022-06-14 RX ADMIN — Medication 25 MILLIGRAM(S): at 05:50

## 2022-06-14 RX ADMIN — Medication 30 MILLIGRAM(S): at 05:49

## 2022-06-14 RX ADMIN — Medication 25 MILLIGRAM(S): at 17:52

## 2022-06-14 NOTE — PROVIDER CONTACT NOTE (OTHER) - ACTION/TREATMENT ORDERED:
As per PA Lalo, will continue to educate patient on DVT prophylaxis and the risks and reassess readiness

## 2022-06-14 NOTE — PROVIDER CONTACT NOTE (OTHER) - ASSESSMENT
Patient is A&O x4, denies chest pain or SOB and states that a family member passed away as a result of heparin

## 2022-06-14 NOTE — EEG REPORT - NS EEG TEXT BOX
[facility]  Comprehensive Epilepsy Center  Report of Continuous Video EEG    [facility]: [address], [place] [address], Phone [phone]  [place] Office: [address], [address], [place] [address] Phone [phone]    [facility]: [address] [place] [address], Phone [phone]  [place] Office: [address] [place] [address], Phone [phone]    Patient Name: HARRIETT SHANNON  Age: 57 year, : 1965  Patient ID: -, MRN #: -, Tony: [id number]  Referring Physician: -  EEG #: [id number]    Study Time/Date: 12:26:46 PM on 2022  	  End Time/Date: 0800 on 2022			   Duration: 20H    Study Information:    EEG Recording Technique:  The patient underwent continuous Video-EEG monitoring, using Telemetry System hardware on the XLTek Digital System. EEG and video data were stored on a computer hard drive with important events saved in digital archive files. The material was reviewed by a physician (electroencephalographer / epileptologist) on a daily basis. David and seizure detection algorithms were utilized and reviewed. An EEG Technician attended to the patient, and was available throughout daytime work hours.  The epilepsy center neurologist was available in person or on call 24-hours per day.    EEG Placement and Labeling of Electrodes:  The EEG was performed utilizing 20 channel referential EEG connections (coronal over temporal over parasagittal montage) using all standard 10-20 electrode placements, with additional electrodes placed in the inferior temporal region using the modified 10-10 montage electrode placements for elective admissions, or if deemed necessary. Recording was at a sampling rate of 256 samples per second per channel. Time synchronized digital video recording was done simultaneously with EEG recording. A low light infrared camera was used for low light recording.     History:   VEEG AT [id number]  57 YEAR OLD FEMALE  COR: AWAKE / DROWSY  P/W:SYNCOPE&COLLAPSE  PMH:CVA  HV:NOT COMPLETED DUE TO COVI9D PROTOCOL  PHOTIC:COMPLETED  A&OX  CONCERN FOR SEIZURE        Pertinent Medication  Topamax (Topiramate)  Procardia XL    Interpretation:    Daily EEG Visual Analysis  Findings: The background was continuous and reactive. During wakefulness, the posterior dominant rhythm consisted of symmetric, well-modulated 9 Hz activity, with amplitude to 30 uV, that attenuated to eye opening.     Background Slowing:  No generalized background slowing was present.    Focal Slowing:   None were present.    Sleep Background:  Drowsiness was characterized by fragmentation, attenuation, and slowing of the background activity.    Sleep was characterized by the presence of vertex waves, symmetric sleep spindles and K-complexes.    Other Non-Epileptiform Findings:  None were present.    Interictal Epileptiform Activity:   None were present.    Events:  Clinical events: None recorded.  Seizures: None recorded.    Activation Procedures:   Hyperventilation was not performed.    Photic stimulation was not performed.     Artifacts:  Intermittent myogenic and movement artifacts were noted.    EEG Summary / Classification:  Normal EEG in the awake, drowsy and asleep states.    EEG Impression / Clinical Correlate:  Normal EEG study.  No epileptiform pattern or seizure seen.    **In absence of additional clinical concerns, recommend consideration for discontinuation of current EEG study with reconnection in future if warranted.    Best Mcdonough MD  Attending Physician, [facility] [place]   [facility]  Comprehensive Epilepsy Center  Report of Continuous Video EEG    [facility]: [address], [place] [address], Phone [phone]  [place] Office: [address], [address], [place] [address] Phone [phone]    [facility]: [address] [place] [address], Phone [phone]  [place] Office: [address] [place] [address], Phone [phone]    Patient Name: HARRIETT SHANNON  Age: 57 year, : 1965  Patient ID: -, MRN #: -, Tony: [id number]  Referring Physician: -  EEG #: [id number]    Study Time/Date: 12:26:46 PM on 2022  	  End Time/Date: 1300 on 2022			   Duration: 24H    Study Information:    EEG Recording Technique:  The patient underwent continuous Video-EEG monitoring, using Telemetry System hardware on the XLTek Digital System. EEG and video data were stored on a computer hard drive with important events saved in digital archive files. The material was reviewed by a physician (electroencephalographer / epileptologist) on a daily basis. David and seizure detection algorithms were utilized and reviewed. An EEG Technician attended to the patient, and was available throughout daytime work hours.  The epilepsy center neurologist was available in person or on call 24-hours per day.    EEG Placement and Labeling of Electrodes:  The EEG was performed utilizing 20 channel referential EEG connections (coronal over temporal over parasagittal montage) using all standard 10-20 electrode placements, with additional electrodes placed in the inferior temporal region using the modified 10-10 montage electrode placements for elective admissions, or if deemed necessary. Recording was at a sampling rate of 256 samples per second per channel. Time synchronized digital video recording was done simultaneously with EEG recording. A low light infrared camera was used for low light recording.     History:   VEEG AT [id number]  57 YEAR OLD FEMALE  COR: AWAKE / DROWSY  P/W:SYNCOPE&COLLAPSE  PMH:CVA  HV:NOT COMPLETED DUE TO COVI9D PROTOCOL  PHOTIC:COMPLETED  A&OX  CONCERN FOR SEIZURE        Pertinent Medication  Topamax (Topiramate)  Procardia XL    Interpretation:    Daily EEG Visual Analysis  Findings: The background was continuous and reactive. During wakefulness, the posterior dominant rhythm consisted of symmetric, well-modulated 9 Hz activity, with amplitude to 30 uV, that attenuated to eye opening.     Background Slowing:  No generalized background slowing was present.    Focal Slowing:   None were present.    Sleep Background:  Drowsiness was characterized by fragmentation, attenuation, and slowing of the background activity.    Sleep was characterized by the presence of vertex waves, symmetric sleep spindles and K-complexes.    Other Non-Epileptiform Findings:  None were present.    Interictal Epileptiform Activity:   None were present.    Events:  Clinical events: None recorded.  Seizures: None recorded.    Activation Procedures:   Hyperventilation was not performed.    Photic stimulation was not performed.     Artifacts:  Intermittent myogenic and movement artifacts were noted.    EEG Summary / Classification:  Normal EEG in the awake, drowsy and asleep states.    EEG Impression / Clinical Correlate:  Normal EEG study.  No epileptiform pattern or seizure seen.    **In absence of additional clinical concerns, recommend consideration for discontinuation of current EEG study with reconnection in future if warranted.    Best Mcdonough MD  Attending Physician, [facility]

## 2022-06-14 NOTE — PROGRESS NOTE ADULT - NS ATTEND AMEND GEN_ALL_CORE FT
57 year old woman with a pmhx of intracerebral hemorrhage (10/2021) and HTN who presented to the ED for an unwitnessed syncopal event. EP consulted for ILR placement. I discussed with her today the risks vs benefits. She has declined proceeding with an ILR. EP to sign off.

## 2022-06-15 ENCOUNTER — TRANSCRIPTION ENCOUNTER (OUTPATIENT)
Age: 57
End: 2022-06-15

## 2022-06-15 VITALS
RESPIRATION RATE: 17 BRPM | OXYGEN SATURATION: 100 % | HEART RATE: 76 BPM | DIASTOLIC BLOOD PRESSURE: 81 MMHG | TEMPERATURE: 98 F | SYSTOLIC BLOOD PRESSURE: 123 MMHG

## 2022-06-15 LAB
ANION GAP SERPL CALC-SCNC: 12 MMOL/L — SIGNIFICANT CHANGE UP (ref 7–14)
BUN SERPL-MCNC: 20 MG/DL — SIGNIFICANT CHANGE UP (ref 7–23)
CALCIUM SERPL-MCNC: 8.7 MG/DL — SIGNIFICANT CHANGE UP (ref 8.4–10.5)
CHLORIDE SERPL-SCNC: 108 MMOL/L — HIGH (ref 98–107)
CO2 SERPL-SCNC: 20 MMOL/L — LOW (ref 22–31)
CREAT SERPL-MCNC: 1.1 MG/DL — SIGNIFICANT CHANGE UP (ref 0.5–1.3)
EGFR: 59 ML/MIN/1.73M2 — LOW
GLUCOSE SERPL-MCNC: 89 MG/DL — SIGNIFICANT CHANGE UP (ref 70–99)
HCT VFR BLD CALC: 39.6 % — SIGNIFICANT CHANGE UP (ref 34.5–45)
HGB BLD-MCNC: 12 G/DL — SIGNIFICANT CHANGE UP (ref 11.5–15.5)
MAGNESIUM SERPL-MCNC: 2.1 MG/DL — SIGNIFICANT CHANGE UP (ref 1.6–2.6)
MCHC RBC-ENTMCNC: 25.8 PG — LOW (ref 27–34)
MCHC RBC-ENTMCNC: 30.3 GM/DL — LOW (ref 32–36)
MCV RBC AUTO: 85 FL — SIGNIFICANT CHANGE UP (ref 80–100)
NRBC # BLD: 0 /100 WBCS — SIGNIFICANT CHANGE UP
NRBC # FLD: 0 K/UL — SIGNIFICANT CHANGE UP
PHOSPHATE SERPL-MCNC: 3.1 MG/DL — SIGNIFICANT CHANGE UP (ref 2.5–4.5)
PLATELET # BLD AUTO: 249 K/UL — SIGNIFICANT CHANGE UP (ref 150–400)
POTASSIUM SERPL-MCNC: 3.7 MMOL/L — SIGNIFICANT CHANGE UP (ref 3.5–5.3)
POTASSIUM SERPL-SCNC: 3.7 MMOL/L — SIGNIFICANT CHANGE UP (ref 3.5–5.3)
RBC # BLD: 4.66 M/UL — SIGNIFICANT CHANGE UP (ref 3.8–5.2)
RBC # FLD: 15.5 % — HIGH (ref 10.3–14.5)
SODIUM SERPL-SCNC: 140 MMOL/L — SIGNIFICANT CHANGE UP (ref 135–145)
TOPIRAMATE SERPL-MCNC: <1 MCG/ML — SIGNIFICANT CHANGE UP
WBC # BLD: 6.8 K/UL — SIGNIFICANT CHANGE UP (ref 3.8–10.5)
WBC # FLD AUTO: 6.8 K/UL — SIGNIFICANT CHANGE UP (ref 3.8–10.5)

## 2022-06-15 RX ORDER — NIFEDIPINE 30 MG
1 TABLET, EXTENDED RELEASE 24 HR ORAL
Qty: 30 | Refills: 0
Start: 2022-06-15 | End: 2022-07-14

## 2022-06-15 RX ORDER — TOPIRAMATE 25 MG
1 TABLET ORAL
Qty: 60 | Refills: 0
Start: 2022-06-15 | End: 2022-07-14

## 2022-06-15 RX ADMIN — Medication 25 MILLIGRAM(S): at 05:14

## 2022-06-15 RX ADMIN — Medication 30 MILLIGRAM(S): at 05:14

## 2022-06-15 RX ADMIN — Medication 25 MILLIGRAM(S): at 17:11

## 2022-06-15 NOTE — DISCHARGE NOTE PROVIDER - HOSPITAL COURSE
Patient is a 56 Y/O Female with PMHx of CVA (Oct, 2021), HTN presenting with syncope. Pt reports after going to bed, finding herself on the floor with unilateral weakness and unrination and subsequently in bed though does not recall how. Pt is a poor historian, denies weakness, HA, lightheadedness. Pt self dc'd prescribed medications: Nifedipine 30mg, Lisinopril-HCTZ 20/25mg; Topiramate 25mg BID  most of her symptoms have resolved in the hospital       # Unwitnessed syncope and LOC   unknonw etiology   R/O Seizure, vs TIA Vs occult arrythmia   Patient self D/C all her home meds  including BP and seizure meds  Neurology eval appreciated  EEG.  resume topamax for now, may change since patient states that it causes weakness   MRI brain w/wo contrast noted, no acute apthology   check Echo  Loop eval , EKG , Tele   monitor on tele   check orthostatics, stable   monitor electrolytes  fall precautions  PT eval   EP eval for IRL placement     # HTN   resume nifedipine and adjust as tolerated  Check Echo   card follow up    Patient is a 58 Y/O Female with PMHx of CVA (Oct, 2021), HTN presenting with syncope. Pt reports after going to bed, finding herself on the floor with unilateral weakness and unrination and subsequently in bed though does not recall how. Pt is a poor historian, denies weakness, HA, lightheadedness. Pt self dc'd prescribed medications: Nifedipine 30mg, Lisinopril-HCTZ 20/25mg; Topiramate 25mg BID. Syncope of unknown etiology, possible related to stopping prescribed medications including seizure medicine. R/O Seizure, vs TIA Vs occult arrythmia. EEG neg for seizure acitivity, TTE unremarkable, CT/MRI brain negative for acute stroke, has hx prior L parasagiatal IPH likely hypertensive. As per neurology, will restart topamax 25mg BID for now as she self d/tomi. [place] driving. [place]. orthostatics negative. No events noted on tele thus far. EP consulted for ILR however pt refusing at this time. PT recommending home PT.    Discussed with Dr. Shane, pt is cleared for discharge home today 6/15, medications sent to pharmacy of pt's choice.

## 2022-06-15 NOTE — PROGRESS NOTE ADULT - PROVIDER SPECIALTY LIST ADULT
Internal Medicine
Cardiology
Electrophysiology
Internal Medicine
Internal Medicine
Neurology

## 2022-06-15 NOTE — DISCHARGE NOTE NURSING/CASE MANAGEMENT/SOCIAL WORK - PATIENT PORTAL LINK FT
You can access the FollowMyHealth Patient Portal offered by [facility] by registering at the following website: [email or web address]. By joining [employer]’s FollowMyHealth portal, you will also be able to view your health information using other applications (apps) compatible with our system.

## 2022-06-15 NOTE — DISCHARGE NOTE PROVIDER - NSDCFUSCHEDAPPT_GEN_ALL_CORE_FT
[facility] [phone]  Scheduled Appointment: 06/29/2022

## 2022-06-15 NOTE — PROGRESS NOTE ADULT - SUBJECTIVE AND OBJECTIVE BOX
DATE OF SERVICE: 06-13-22      Patient is a 57y old  Female who presents with a chief complaint of Syncope (12 Jun 2022 15:03)      INTERVAL HISTORY: feels ok    REVIEW OF SYSTEMS:  CONSTITUTIONAL: No weakness  EYES/ENT: No visual changes;  No throat pain   NECK: No pain or stiffness  RESPIRATORY: No cough, wheezing; No shortness of breath  CARDIOVASCULAR: No chest pain or palpitations  GASTROINTESTINAL: No abdominal  pain. No nausea, vomiting, or hematemesis  GENITOURINARY: No dysuria, frequency or hematuria  NEUROLOGICAL: No stroke like symptoms  SKIN: No rashes      TELEMETRY Personally reviewed: no events  	  MEDICATIONS:  NIFEdipine XL 30 milliGRAM(s) Oral daily        PHYSICAL EXAM:  T(C): 36.7 (06-13-22 @ 21:36), Max: 36.9 (06-13-22 @ 13:30)  HR: 70 (06-13-22 @ 21:36) (67 - 74)  BP: 107/80 (06-13-22 @ 21:36) (107/80 - 122/69)  RR: 18 (06-13-22 @ 21:36) (16 - 18)  SpO2: 100% (06-13-22 @ 21:36) (100% - 100%)  Wt(kg): --  I&O's Summary        Appearance: In no distress	  HEENT:    PERRL, EOMI	  Cardiovascular:  S1 S2, No JVD  Respiratory: Lungs clear to auscultation	  Gastrointestinal:  Soft, Non-tender, + BS	  Vascularature:  No edema of LE  Psychiatric: Appropriate affect   Neuro: no acute focal deficits                               11.4   5.59  )-----------( 223      ( 13 Jun 2022 05:57 )             38.2     06-13    140  |  108<H>  |  16  ----------------------------<  104<H>  4.0   |  21<L>  |  1.03    Ca    8.7      13 Jun 2022 05:57  Phos  3.3     06-13  Mg     2.10     06-13    TPro  7.2  /  Alb  3.8  /  TBili  <0.2  /  DBili  x   /  AST  16  /  ALT  13  /  AlkPhos  80  06-13        Labs personally reviewed      EKG: Personally reviewed by me - NSR  Radiology: Personally reviewed by me - CXR clear lungs      Assessment:  · Assessment	  Patient is a 56 Y/O Female with PMHx of CVA (Oct, 2021), HTN presenting with syncope. Pt reports after going to bed, finding herself on the floor with unilateral weakness and urination and subsequently in bed though does not recall how. Pt is a poor historian, denies weakness, HA, lightheadedness. Pt self dc'd prescribed medications: Nifedipine 30mg, Lisinopril-HCTZ 20/25mg; Topiramate 25mg BID  - most of her symptoms have resolved in the hospital       1. Unwitnessed syncope   - negative orthostatics   - likely precipitated by intermittent fasting, she reports starting intermittent fasting 11 days ago.   - she fasts for up to 17 hours at a time  - less likely occult arrythmia, monitor on tele  - TTE   - neuro recs appreciated,  R/O Seizure,   - Patient self D/C all her home meds  - Resumed Topamax for now, may change since patient states that it causes weakness   - MRI brain w/wo contrast  - monitor electrolytes  - fall precautions  - PT eval     2. HTN   resumed nifedipine and adjust as tolerated  Check Echo       3. DVT and GI PPX         Thirty five minutes spent on encounter, of which more than fifty percent of the encounter was spent on counseling and/or coordinating care by the attending physician.      Oliverio Nelson DO [facility]  Cardiovascular Medicine  [address], [address]  Office: [phone]  Cell: [phone]
DATE OF SERVICE: 06-14-22 @ 21:23    Patient is a 57y old  Female who presents with a chief complaint of Syncope (12 Jun 2022 15:03)      INTERVAL HISTORY: feels well    REVIEW OF SYSTEMS:  CONSTITUTIONAL: No weakness  EYES/ENT: No visual changes;  No throat pain   NECK: No pain or stiffness  RESPIRATORY: No cough, wheezing; No shortness of breath  CARDIOVASCULAR: No chest pain or palpitations  GASTROINTESTINAL: No abdominal  pain. No nausea, vomiting, or hematemesis  GENITOURINARY: No dysuria, frequency or hematuria  NEUROLOGICAL: No stroke like symptoms  SKIN: No rashes      TELEMETRY Personally reviewed: no events, NSR  	  MEDICATIONS:  NIFEdipine XL 30 milliGRAM(s) Oral daily        PHYSICAL EXAM:  T(C): 36.2 (06-14-22 @ 13:48), Max: 36.7 (06-13-22 @ 21:36)  HR: 75 (06-14-22 @ 13:48) (63 - 75)  BP: 119/66 (06-14-22 @ 13:48) (107/80 - 119/66)  RR: 16 (06-14-22 @ 13:48) (16 - 18)  SpO2: 100% (06-14-22 @ 13:48) (100% - 100%)  Wt(kg): --  I&O's Summary        Appearance: In no distress	  HEENT:    PERRL, EOMI	  Cardiovascular:  S1 S2, No JVD  Respiratory: Lungs clear to auscultation	  Gastrointestinal:  Soft, Non-tender, + BS	  Vascularature:  No edema of LE  Psychiatric: Appropriate affect   Neuro: no acute focal deficits                               11.9   5.68  )-----------( 238      ( 14 Jun 2022 05:30 )             39.6     06-14    141  |  108<H>  |  16  ----------------------------<  106<H>  3.9   |  21<L>  |  0.97    Ca    9.0      14 Jun 2022 05:30  Phos  3.1     06-14  Mg     2.10     06-14    TPro  7.4  /  Alb  3.6  /  TBili  <0.2  /  DBili  x   /  AST  18  /  ALT  14  /  AlkPhos  84  06-14        Labs personally reviewed      EKG: Personally reviewed by me - NSR  Radiology: Personally reviewed by me - CXR clear lungs      < from: MR Head w/wo IV Cont (06.13.22 @ 10:07) >  IMPRESSION:    No hydrocephalus, mass effect, acute intracranial hemorrhage, vasogenic   edema, or acute territorial infarct.    Susceptibility artifact and minimal gliosis in the left posteromedial   parietal lobe, consistent with chronic hemorrhage.    No abnormal parenchymal or leptomeningeal enhancement.    < end of copied text >      Assessment:  · Assessment	  Patient is a 56 Y/O Female with PMHx of CVA (Oct, 2021), HTN presenting with syncope. Pt reports after going to bed, finding herself on the floor with unilateral weakness and urination and subsequently in bed though does not recall how. Pt is a poor historian, denies weakness, HA, lightheadedness. Pt self dc'd prescribed medications: Nifedipine 30mg, Lisinopril-HCTZ 20/25mg; Topiramate 25mg BID  - most of her symptoms have resolved in the hospital       1. Unwitnessed syncope   - negative orthostatics   - likely precipitated by intermittent fasting, she reports starting intermittent fasting 11 days ago.   - she fasts for up to 17 hours at a time  - less likely occult arrythmia, monitor on tele - no events  - EP eval appreciated for ILR  - TTE normal  - neuro recs appreciated,  R/O Seizure,   - Patient self D/C all her home meds  - Resumed Topamax as per neuro  - MRI brain w/wo contrast minimal gliosis in the left posteromedial parietal lobe, consistent with chronic hemorrhage.    - monitor electrolytes  - fall precautions  - PT eval     2. HTN   resumed nifedipine and adjust as tolerated  Echo normal     3. DVT and GI PPX         I had a prolonged conversation with the patient regarding hospital course, differential diagnosis and results of diagnostic tests.  Plan of care discussed with patient after the evaluation. Patient expresses clear understanding and satisfaction with the plan of care.  Sixty five minutes spent on encounter, of which more than fifty percent of the encounter was spent on counseling and/or coordinating care by the attending physician.              Oliverio Nelson DO [facility]  Cardiovascular Medicine  [address] [place] [address], [address]  Office: [phone]  Cell: [phone]
DATE OF SERVICE: 06-15-22 @ 23:29    Patient is a 57y old  Female who presents with a chief complaint of Syncope (12 Jun 2022 15:03)      INTERVAL HISTORY: feels ok    REVIEW OF SYSTEMS:  CONSTITUTIONAL: No weakness  EYES/ENT: No visual changes;  No throat pain   NECK: No pain or stiffness  RESPIRATORY: No cough, wheezing; No shortness of breath  CARDIOVASCULAR: No chest pain or palpitations  GASTROINTESTINAL: No abdominal  pain. No nausea, vomiting, or hematemesis  GENITOURINARY: No dysuria, frequency or hematuria  NEUROLOGICAL: No stroke like symptoms  SKIN: No rashes      TELEMETRY Personally reviewed: no events  	  MEDICATIONS:  NIFEdipine XL 30 milliGRAM(s) Oral daily        PHYSICAL EXAM:  T(C): 36.8 (06-15-22 @ 12:01), Max: 37 (06-15-22 @ 05:00)  HR: 76 (06-15-22 @ 12:01) (65 - 76)  BP: 123/81 (06-15-22 @ 12:01) (121/70 - 123/81)  RR: 17 (06-15-22 @ 12:01) (17 - 17)  SpO2: 100% (06-15-22 @ 12:01) (100% - 100%)  Wt(kg): --  I&O's Summary        Appearance: In no distress	  HEENT:    PERRL, EOMI	  Cardiovascular:  S1 S2, No JVD  Respiratory: Lungs clear to auscultation	  Gastrointestinal:  Soft, Non-tender, + BS	  Vascularature:  No edema of LE  Psychiatric: Appropriate affect   Neuro: no acute focal deficits                               12.0   6.80  )-----------( 249      ( 15 Bola 2022 04:48 )             39.6     06-15    140  |  108<H>  |  20  ----------------------------<  89  3.7   |  20<L>  |  1.10    Ca    8.7      15 Bola 2022 04:48  Phos  3.1     06-15  Mg     2.10     06-15    TPro  7.4  /  Alb  3.6  /  TBili  <0.2  /  DBili  x   /  AST  18  /  ALT  14  /  AlkPhos  84  06-14        Labs personally reviewed      EKG: Personally reviewed by me - NSR  Radiology: Personally reviewed by me - CXR clear lungs      < from: MR Head w/wo IV Cont (06.13.22 @ 10:07) >  IMPRESSION:    No hydrocephalus, mass effect, acute intracranial hemorrhage, vasogenic   edema, or acute territorial infarct.    Susceptibility artifact and minimal gliosis in the left posteromedial   parietal lobe, consistent with chronic hemorrhage.    No abnormal parenchymal or leptomeningeal enhancement.    < end of copied text >      Assessment:  · Assessment	  Patient is a 58 Y/O Female with PMHx of CVA (Oct, 2021), HTN presenting with syncope. Pt reports after going to bed, finding herself on the floor with unilateral weakness and urination and subsequently in bed though does not recall how. Pt is a poor historian, denies weakness, HA, lightheadedness. Pt self dc'd prescribed medications: Nifedipine 30mg, Lisinopril-HCTZ 20/25mg; Topiramate 25mg BID  - most of her symptoms have resolved in the hospital       1. Unwitnessed syncope   - negative orthostatics   - likely precipitated by intermittent fasting, she reports starting intermittent fasting 11 days ago.   - she fasts for up to 17 hours at a time  - less likely occult arrythmia, monitor on tele - no events  - EP eval appreciated for ILR  - TTE normal  - neuro recs appreciated,  R/O Seizure,   - Patient self D/C all her home meds  - Resumed Topamax as per neuro  - MRI brain w/wo contrast minimal gliosis in the left posteromedial parietal lobe, consistent with chronic hemorrhage.    - monitor electrolytes  - fall precautions  - PT eval     2. HTN   resumed nifedipine and adjust as tolerated  Echo normal     3. DVT and GI PPX               Oliverio Nelson DO [facility]  Cardiovascular Medicine  [address] [place] [address], [address]  Office: [phone]  Cell: [phone]
Name of Patient : HARRIETT SHANNON  MRN: [id number]  Date of visit: 06-15-22       Subjective: Patient seen and examined. No new events except as noted.   Doing okay     REVIEW OF SYSTEMS:    CONSTITUTIONAL: No weakness, fevers or chills  EYES/ENT: No visual changes;  No vertigo or throat pain   NECK: No pain or stiffness  RESPIRATORY: No cough, wheezing, hemoptysis; No shortness of breath  CARDIOVASCULAR: No chest pain or palpitations  GASTROINTESTINAL: No abdominal or epigastric pain. No nausea, vomiting, or hematemesis; No diarrhea or constipation. No melena or hematochezia.  GENITOURINARY: No dysuria, frequency or hematuria  NEUROLOGICAL: No numbness or weakness  SKIN: No itching, burning, rashes, or lesions   All other review of systems is negative unless indicated above.    MEDICATIONS:  MEDICATIONS  (STANDING):  heparin   Injectable 5000 Unit(s) SubCutaneous every 8 hours  NIFEdipine XL 30 milliGRAM(s) Oral daily  topiramate 25 milliGRAM(s) Oral two times a day      PHYSICAL EXAM:  T(C): 36.8 (06-15-22 @ 12:01), Max: 37 (06-15-22 @ 05:00)  HR: 76 (06-15-22 @ 12:01) (65 - 76)  BP: 123/81 (06-15-22 @ 12:01) (121/70 - 123/81)  RR: 17 (06-15-22 @ 12:01) (17 - 17)  SpO2: 100% (06-15-22 @ 12:01) (100% - 100%)  Wt(kg): --  I&O's Summary        Appearance: Normal	  HEENT:  PERRLA   Lymphatic: No lymphadenopathy   Cardiovascular: Normal S1 S2, no JVD  Respiratory: normal effort , clear  Gastrointestinal:  Soft, Non-tender  Skin: No rashes,  warm to touch  Psychiatry:  Mood & affect appropriate  Musculuskeletal: No edema      All labs, Imaging and EKGs personally reviewed                           12.0   6.80  )-----------( 249      ( 15 Bola 2022 04:48 )             39.6               06-15    140  |  108<H>  |  20  ----------------------------<  89  3.7   |  20<L>  |  1.10    Ca    8.7      15 Bola 2022 04:48  Phos  3.1     06-15  Mg     2.10     06-15    TPro  7.4  /  Alb  3.6  /  TBili  <0.2  /  DBili  x   /  AST  18  /  ALT  14  /  AlkPhos  84  06-14                                           
Name of Patient : HARRIETT SHANNON  MRN: [id number]  Date of visit: 06-14-22       Subjective: Patient seen and examined. No new events except as noted.   arabella hardin  BP controlled  no LOC  on Vid EEG     REVIEW OF SYSTEMS:    CONSTITUTIONAL: No weakness, fevers or chills  EYES/ENT: No visual changes;  No vertigo or throat pain   NECK: No pain or stiffness  RESPIRATORY: No cough, wheezing, hemoptysis; No shortness of breath  CARDIOVASCULAR: No chest pain or palpitations  GASTROINTESTINAL: No abdominal or epigastric pain. No nausea, vomiting, or hematemesis; No diarrhea or constipation. No melena or hematochezia.  GENITOURINARY: No dysuria, frequency or hematuria  NEUROLOGICAL: No numbness or weakness  SKIN: No itching, burning, rashes, or lesions   All other review of systems is negative unless indicated above.    MEDICATIONS:  MEDICATIONS  (STANDING):  heparin   Injectable 5000 Unit(s) SubCutaneous every 8 hours  NIFEdipine XL 30 milliGRAM(s) Oral daily  topiramate 25 milliGRAM(s) Oral two times a day      PHYSICAL EXAM:  T(C): 36.2 (06-14-22 @ 13:48), Max: 36.6 (06-14-22 @ 05:49)  HR: 75 (06-14-22 @ 13:48) (63 - 75)  BP: 119/66 (06-14-22 @ 13:48) (116/73 - 119/66)  RR: 16 (06-14-22 @ 13:48) (16 - 18)  SpO2: 100% (06-14-22 @ 13:48) (100% - 100%)  Wt(kg): --  I&O's Summary        Appearance: Normal	  HEENT:  PERRLA   Lymphatic: No lymphadenopathy   Cardiovascular: Normal S1 S2, no JVD  Respiratory: normal effort , clear  Gastrointestinal:  Soft, Non-tender  Skin: No rashes,  warm to touch  Psychiatry:  Mood & affect appropriate  Musculuskeletal: No edema      All labs, Imaging and EKGs personally reviewed                           11.9 5.68  )-----------( 238      ( 14 Jun 2022 05:30 )             39.6               06-14    141  |  108<H>  |  16  ----------------------------<  106<H>  3.9   |  21<L>  |  0.97    Ca    9.0      14 Jun 2022 05:30  Phos  3.1     06-14  Mg     2.10     06-14    TPro  7.4  /  Alb  3.6  /  TBili  <0.2  /  DBili  x   /  AST  18  /  ALT  14  /  AlkPhos  84  06-14                                           
Neurology Progress Note    S: Patient seen and examined. No new events overnight. patient denied CP, SOB, HA or pain. MRI neg for new changes     Medication:  heparin   Injectable 5000 Unit(s) SubCutaneous every 8 hours  NIFEdipine XL 30 milliGRAM(s) Oral daily  topiramate 25 milliGRAM(s) Oral two times a day      Vitals:  Vital Signs Last 24 Hrs  T(C): 36.6 (14 Jun 2022 05:49), Max: 36.9 (13 Jun 2022 13:30)  T(F): 97.9 (14 Jun 2022 05:49), Max: 98.4 (13 Jun 2022 13:30)  HR: 63 (14 Jun 2022 05:49) (63 - 74)  BP: 116/73 (14 Jun 2022 05:49) (107/80 - 117/65)  BP(mean): --  RR: 18 (14 Jun 2022 05:49) (16 - 18)  SpO2: 100% (14 Jun 2022 05:49) (100% - 100%)    General Exam:   General Appearance: Appropriately dressed and in no acute distress       Head: Normocephalic, atraumatic and no dysmorphic features  Ear, Nose, and Throat: Moist mucous membranes  CVS: S1S2+  Resp: No SOB, no wheeze or rhonchi  GI: soft NT/ND  Extremities: No edema or cyanosis  Skin: No bruises or rashes     Neurological Exam:  Mental Status: Awake, alert and oriented x 3.  Able to follow simple and complex verbal commands. Able to name and repeat. fluent speech. No obvious aphasia or dysarthria noted.   Cranial Nerves: PERRL, EOMI, VFFC, sensation V1-V3 intact,  no obvious facial asymmetry, equal elevation of palate, scm/trap 5/5, tongue is midline on protrusion. no obvious papilledema on fundoscopic exam. hearing is grossly intact.   Motor: Normal bulk, tone and strength throughout. Fine finger movements were intact and symmetric. no tremors or drift noted.    Sensation: Intact to light touch and pinprick throughout. no right/left confusion. no extinction to tactile on DSS. Romberg was negative.   Reflexes: 1+ throughout at biceps, brachioradialis, triceps, patellars and ankles bilaterally and equal. No clonus. R toe and L toe were both downgoing.  Coordination: No dysmetria on FNF or HKS  Gait: deferred     I personally reviewed the below data/images/labs:      CBC Full  -  ( 14 Jun 2022 05:30 )  WBC Count : 5.68 K/uL  RBC Count : 4.64 M/uL  Hemoglobin : 11.9 g/dL  Hematocrit : 39.6 %  Platelet Count - Automated : 238 K/uL  Mean Cell Volume : 85.3 fL  Mean Cell Hemoglobin : 25.6 pg  Mean Cell Hemoglobin Concentration : 30.1 gm/dL  Auto Neutrophil # : x  Auto Lymphocyte # : x  Auto Monocyte # : x  Auto Eosinophil # : x  Auto Basophil # : x  Auto Neutrophil % : x  Auto Lymphocyte % : x  Auto Monocyte % : x  Auto Eosinophil % : x  Auto Basophil % : x    06-14    141  |  108<H>  |  16  ----------------------------<  106<H>  3.9   |  21<L>  |  0.97    Ca    9.0      14 Jun 2022 05:30  Phos  3.1     06-14  Mg     2.10     06-14    TPro  7.4  /  Alb  3.6  /  TBili  <0.2  /  DBili  x   /  AST  18  /  ALT  14  /  AlkPhos  84  06-14    LIVER FUNCTIONS - ( 14 Jun 2022 05:30 )  Alb: 3.6 g/dL / Pro: 7.4 g/dL / ALK PHOS: 84 U/L / ALT: 14 U/L / AST: 18 U/L / GGT: x             Prior Workup         IR Neuro (10.18.21 @ 08:34)  IMPRESSION:  1. No angio architectural weakness. No proximal or distal aneurysms. No pial or dural vascular malformations.  2. Bilateral cervical carotid fibromuscular dysplasia.  3. No intracranial or extracranial atherosclerotic stenosis.  4. The superficial and deep pial veins and dural venous sinuses are widely patent and drain in normal sequence. No venous occlusion. No developmental venous anomaly.    Materials employed: 5 Kenyan microaccess set, 4 Kenyan sheath, 0.035 inch Bentson wire, 0.038 inch Terumo guidewire, 0.035 inch shapeable Terumo guidewire, 4 Kenyan vertebral diagnostic catheter.    Vessels studied:  Left vertebral artery x2  Right vertebral artery x1  Right internal carotid artery x3  Right external carotid artery x1  Left internal carotid artery x3  Left external carotid artery x1      CTH No cont (10/16/21):  Compared to previous studies, stable intraparenchymal hemorrhage in the left parietal lobule with mild increase in ashley-hemorrhagic edema, consistent with expected evolution of hemorrhage.No new hemorrhage or extension of the hemorrhage in the ventricles.    CT CHEST IC, CT ABDOMEN AND PELVIS OC IC (10/16/21):  No evidence of suspicious mass or lymphadenopathy in the chest, abdomen, or pelvis.         (10/13/21) @[facility]):   CT brain: There is an intraparenchymal hemorrhage in the left parietal lobule with perihemorrhagic edema as described. There is no extension of the hemorrhage into the ventricles, midline shift or herniation. There is no acute lobar infarction.  CTA brain: There is no large vessel occlusion or aneurysm involving major proximal intracranial arteries. There is no dominant arteriovenous malformation.  CTA NECK: There is no stenosis involving major neck arteries.  CT venogram: There is no high-grade stenosis or occlusion within the dural venous sinuses.    MR Head w/wo IV Cont (10.14.21 @ [facility]):  Left parietal parasagittal acute hematoma with surrounding edema in the distribution of the left PCA territory. No abnormal enhancement in association. No vascular hypertrophy        ACC: [id number] EXAM:  CT BRAIN                          PROCEDURE DATE:  06/12/2022          INTERPRETATION:  INDICATIONS:  Seizure.    TECHNIQUE:  Serial axial images were obtained from the skull base to the   vertex without intravenous contrast. Sagittal and coronal reformats were   performed.    COMPARISON EXAMINATION: Head CT dated 10/16/2021. MRI dated 10/14/2021.    FINDINGS:    No acute intracranial hemorrhage, mass effect, or cerebral edema. Mild   chronic white matter microvascular changes. The ventricles and cortical   sulci are within normal limits for age.    No abnormal extra-axial collection. No hydrocephalus.    The calvarium is intact. Visualized portions of the paranasal sinuses are   clear.    IMPRESSION:  No acute intracranial hemorrhage, mass effect, or cerebral edema.    If there is high clinical suspicion, MRI may be more sensitive for   detection of a discrete seizure nidus.    --- End of Report ---          ELADIA BURT MD; Resident Radiologist  This document has been electronically signed.  NADER PIERRE MD; Attending Radiologist  This document has been electronically signed. Jun 12 2022  6:21AM    < end of copied text >    < from: MR Head w/wo IV Cont (06.13.22 @ 10:07) >    ACC: [id number] EXAM:  MR BRAIN WAW IC                          PROCEDURE DATE:  06/13/2022          INTERPRETATION:  Contrast-enhanced MRI of the brain.    CLINICAL INDICATION: Syncope, altered mental status    TECHNIQUE:  Multiplanar, multisequenceMR images of the brain were   obtained before and after the intravenous administration of 10 cc of   Gadavist. 0 cc were discarded.    COMPARISON: MRI brain 10/14/2021. CT brain 6/12/2022.    FINDINGS:    No hydrocephalus, midline shift, mass effect,vasogenic edema, or acute   intracranial hemorrhage.  Diffusion weighted images demonstrate no acute   territorial infarct.    Susceptibility artifact and minimal gliosis in the left posteromedial   parietal lobe, consistent with chronic hemorrhage.    No abnormal parenchymal or leptomeningeal enhancement.    Flow voids are seen within the major intracranial vessels consistent with   their patency.    Visualized paranasal sinuses and mastoid air cells are clear.    Orbits, sellar and suprasellar structures, and craniocervical junction   are unremarkable.    IMPRESSION:    No hydrocephalus, mass effect, acute intracranial hemorrhage, vasogenic   edema, or acute territorial infarct.    Susceptibility artifact and minimal gliosis in the left posteromedial   parietal lobe, consistent with chronic hemorrhage.    No abnormal parenchymal or leptomeningeal enhancement.    --- End of Report ---            NAKUL LAWRENCE MD; Attending Radiologist  This document has been electronically signed. Jun 13 2022 10:15AM    < end of copied text >        
Subjective: Denies HA, lightheadedness, dizziness, CP, palpitation, SOB, abdominal pain, and Numbness during her admission    Interval events:  - Presented after an unwitnessed syncopal event. According to the patient, she was in bed, stood up and walked to her bathroom at home. While ambulating to the bathroom, she felt weak and  loss of consciousness for unclear duration. She remembers waking up on the floor and noticing arms numbness and that her whole body was shaking. It is less clear, but somehow patient was subsequently in bed, but does not recall how she made it to bed.  -  CT head showed no acute intracranial hemorrhage, mass effect, or cerebral edema.  - Neurology was consulted and MRI which showed no hydrocephalus, mass effect, acute intracranial hemorrhage, vasogenic  edema, or acute territorial infarct; and 24 hours vEEg which showed epileptiform pattern or seizure seen.  - Cardiology was consulted for syncope and recommended orthostatic vital, TTE and resuming patient's topamax.  -  EP was consult for ILR given hx of CVA and syncope. Patient denied having a hx of atrial arrythmia.  Patient will benefit from prolonged monitoring for detection of bradycardiac and tachycardiac arrhythmias such as AF/PAF as cause of potential cardioembolic source in the past and syncope on this admission. Patient refusing ILR     MEDICATIONS  (STANDING):  heparin   Injectable 5000 Unit(s) SubCutaneous every 8 hours  NIFEdipine XL 30 milliGRAM(s) Oral daily  topiramate 25 milliGRAM(s) Oral two times a day    MEDICATIONS  (PRN):      Vital Signs Last 24 Hrs  T(C): 36.6 (14 Jun 2022 05:49), Max: 36.9 (13 Jun 2022 13:30)  T(F): 97.9 (14 Jun 2022 05:49), Max: 98.4 (13 Jun 2022 13:30)  HR: 63 (14 Jun 2022 05:49) (63 - 74)  BP: 116/73 (14 Jun 2022 05:49) (107/80 - 117/65)  BP(mean): --  RR: 18 (14 Jun 2022 05:49) (16 - 18)  SpO2: 100% (14 Jun 2022 05:49) (100% - 100%)  I&O's Detail      Physical Exam:  GENERAL: Siitng in a chair at bedside drinking hot chocolate, well appearing, speaking in full sentence, in NAD  HEART: S1S2 RRR  PULMONARY: CTABL, normal respiratory effort.   ABDOMEN: + Bowel sounds present, soft  EXTREMITIES:  Warm, well -perfused, no pedal edema, distal pulses present  NEUROLOGICAL:AOx3     TELEMETERIC: SR 60-70s bpm                            11.9   5.68  )-----------( 238      ( 14 Jun 2022 05:30 )             39.6       06-14    141  |  108<H>  |  16  ----------------------------<  106<H>  3.9   |  21<L>  |  0.97    Ca    9.0      14 Jun 2022 05:30  Phos  3.1     06-14  Mg     2.10     06-14    TPro  7.4  /  Alb  3.6  /  TBili  <0.2  /  DBili  x   /  AST  18  /  ALT  14  /  AlkPhos  84  06-14          
Name of Patient : HARRIETT SHANNON  MRN: [id number]  Date of visit: 22       Subjective: Patient seen and examined. No new events except as noted.   Doing okay   no LOC  ambulation with no symptoms       REVIEW OF SYSTEMS:    CONSTITUTIONAL: No weakness, fevers or chills  EYES/ENT: No visual changes;  No vertigo or throat pain   NECK: No pain or stiffness  RESPIRATORY: No cough, wheezing, hemoptysis; No shortness of breath  CARDIOVASCULAR: No chest pain or palpitations  GASTROINTESTINAL: No abdominal or epigastric pain. No nausea, vomiting, or hematemesis; No diarrhea or constipation. No melena or hematochezia.  GENITOURINARY: No dysuria, frequency or hematuria  NEUROLOGICAL: No numbness or weakness  SKIN: No itching, burning, rashes, or lesions   All other review of systems is negative unless indicated above.    MEDICATIONS:  MEDICATIONS  (STANDING):  heparin   Injectable 5000 Unit(s) SubCutaneous every 8 hours  NIFEdipine XL 30 milliGRAM(s) Oral daily  topiramate 25 milliGRAM(s) Oral two times a day      PHYSICAL EXAM:  T(C): 36.9 (22 @ 13:30), Max: 36.9 (22 @ 13:30)  HR: 74 (22 @ 13:30) (67 - 74)  BP: 117/65 (22 @ 13:30) (117/65 - 130/67)  RR: 16 (22 @ 13:30) (16 - 17)  SpO2: 100% (22 @ 13:30) (98% - 100%)  Wt(kg): --  I&O's Summary        Appearance: Normal	  HEENT:  PERRLA   Lymphatic: No lymphadenopathy   Cardiovascular: Normal S1 S2, no JVD  Respiratory: normal effort , clear  Gastrointestinal:  Soft, Non-tender  Skin: No rashes,  warm to touch  Psychiatry:  Mood & affect appropriate  Musculuskeletal: No edema      All labs, Imaging and EKGs personally reviewed                           .   559  )-----------( 223      ( 2022 05:57 )             38.2               -    140  |  108<H>  |  16  ----------------------------<  104<H>  4.0   |  21<L>  |  1.03    Ca    8.7      2022 05:57  Phos  3.3     06-  Mg     2.10     06-13    TPro  7.2  /  Alb  3.8  /  TBili  <0.2  /  DBili  x   /  AST  16  /  ALT  13  /  AlkPhos  80  06-                       Urinalysis Basic - ( 2022 05:35 )    Color: Light Yellow / Appearance: Clear / S.021 / pH: x  Gluc: x / Ketone: Negative  / Bili: Negative / Urobili: <2 mg/dL   Blood: x / Protein: Negative / Nitrite: Negative   Leuk Esterase: Negative / RBC: 1 /HPF / WBC 3 /HPF   Sq Epi: x / Non Sq Epi: 6 /HPF / Bacteria: x      < from: MR Head w/wo IV Cont (22 @ 10:07) >    IMPRESSION:    No hydrocephalus, mass effect, acute intracranial hemorrhage, vasogenic   edema, or acute territorial infarct.    Susceptibility artifact and minimal gliosis in the left posteromedial   parietal lobe, consistent with chronic hemorrhage.    No abnormal parenchymal or leptomeningeal enhancement.

## 2022-06-15 NOTE — DISCHARGE NOTE NURSING/CASE MANAGEMENT/SOCIAL WORK - NSDCPEFALRISK_GEN_ALL_CORE
For information on Fall & Injury Prevention, visit: [email or web address] OR  [email or web address] OR  https://www.cdc.gov/steadi/patient.html

## 2022-06-15 NOTE — PROGRESS NOTE ADULT - ASSESSMENT
This is a 57 year old woman with a pmhx of intracerebral hemorrhage (10/2021) and HTN who presented to the ED for an unwitnessed syncopal event. CT head showed no acute intracranial hemorrhage, mass effect, or cerebral edema and MRI which showed no hydrocephalus, mass effect, acute intracranial hemorrhage, vasogenic  edema, or acute territorial infarct. EP was consult for ILR given hx of CVA and syncope. Patient will benefit from prolonged monitoring for detection of bradycardiac and tachycardiac arrhythmias such as AF/PAF as cause of potential cardioembolic source in the past and syncope on this admission. Patient currently refusing ILR. EP will sign off    Plan:  - Continuous telemetric monitoring  - Monitor electrolytes and replete K to 4 and Mg to 2  - vEEG showed epileptiform pattern or seizure seen.  - Appreciated cardiology and neurology recs   - Continue care per primary team    Jes Beal PA-C  Patient to be staffed with attending. Please await attending addendum
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 Pt reports after going to bed, finding herself on the floor with unilateral weakness and unrination and subsequently in bed though does not recall how. Pt is a poor historian, denies weakness, HA, lightheadedness. Pt self dc'd prescribed medications: Nifedipine 30mg, Lisinopril-HCTZ 20/25mg; Topiramate 25mg BID.     MRI brain w/ and w/o 6/13 no new findings. old L Parietal chronic IPH   A1c 5.6; LDL 69   Impression: 1) prior L parasagiatal IPH liekly hypertensive (angio neg) 2) syncope vs seizure  now back to baseline  - vEEG, prelim negative . would restart topamax 25mg BID for now as she self d/tomi.    - NO driving. [place] law given LOC  - r/o infection  - tele/TTE, tte done 10/2021   - PT/OT, OOBC  - BP normotensive   - check FS, glucose control <180  - GI/DVT ppx\  - Thank you for allowing me to participate in the care of this patient. Call with questions.   - no neuro objection to d/c planning with outapatient f/u  Alex Gan MD  Vascular Neurology  Office: [phone] 
Patient is a 56 Y/O Female with PMHx of CVA (Oct, 2021), HTN presenting with syncope. Pt reports after going to bed, finding herself on the floor with unilateral weakness and unrination and subsequently in bed though does not recall how. Pt is a poor historian, denies weakness, HA, lightheadedness. Pt self dc'd prescribed medications: Nifedipine 30mg, Lisinopril-HCTZ 20/25mg; Topiramate 25mg BID  most of her symptoms have resolved in the hospital       # Unwitnessed syncope and LOC   unknonw etiology   R/O Seizure, vs TIA Vs occult arrythmia   Patient self D/C all her home meds  including BP and seizure meds  Neurology eval appreciated  EEG.  resume topamax for now, may change since patient states that it causes weakness   MRI brain w/wo contrast noted, no acute apthology   check Echo  Loop eval , EKG , Tele   monitor on tele   check orthostatics   monitor electrolytes  fall precautions  PT eval   EP eval for IRL placement     # HTN   resume nifedipine and adjust as tolerated  Check Echo   card follow up     DVT and GI PPX  
Patient is a 56 Y/O Female with PMHx of CVA (Oct, 2021), HTN presenting with syncope. Pt reports after going to bed, finding herself on the floor with unilateral weakness and unrination and subsequently in bed though does not recall how. Pt is a poor historian, denies weakness, HA, lightheadedness. Pt self dc'd prescribed medications: Nifedipine 30mg, Lisinopril-HCTZ 20/25mg; Topiramate 25mg BID  most of her symptoms have resolved in the hospital       # Unwitnessed syncope and LOC   unknonw etiology   R/O Seizure, vs TIA Vs occult arrythmia   Patient self D/C all her home meds  including BP and seizure meds  Neurology eval appreciated  EEG.  resume topamax for now, may change since patient states that it causes weakness   MRI brain w/wo contrast noted, no acute apthology   check Echo noted   Loop eval , EKG , Tele   monitor on tele   check orthostatics, stable   monitor electrolytes  fall precautions  PT eval   EP eval for IRL placement , patient refusing     # HTN   resume nifedipine and adjust as tolerated  cont nifedipine only for now for bP control   Check Echo , noted   card follow up     DVT and GI PPX  
Patient is a 58 Y/O Female with PMHx of CVA (Oct, 2021), HTN presenting with syncope. Pt reports after going to bed, finding herself on the floor with unilateral weakness and unrination and subsequently in bed though does not recall how. Pt is a poor historian, denies weakness, HA, lightheadedness. Pt self dc'd prescribed medications: Nifedipine 30mg, Lisinopril-HCTZ 20/25mg; Topiramate 25mg BID  most of her symptoms have resolved in the hospital       # Unwitnessed syncope and LOC   unknonw etiology   R/O Seizure, vs TIA Vs occult arrythmia   Patient self D/C all her home meds  including BP and seizure meds  Neurology eval appreciated  EEG.  resume topamax for now, may change since patient states that it causes weakness   MRI brain w/wo contrast noted, no acute apthology   check Echo  Loop eval , EKG , Tele   monitor on tele   check orthostatics, stable   monitor electrolytes  fall precautions  PT eval   EP eval for IRL placement     # HTN   resume nifedipine and adjust as tolerated  Check Echo   card follow up     DVT and GI PPX

## 2022-06-15 NOTE — DISCHARGE NOTE PROVIDER - NSDCFUADDAPPT_GEN_ALL_CORE_FT
Follow up with your primary care physician for further monitoring in 1-2 weeks. Please call to arrange appointment.

## 2022-06-15 NOTE — DISCHARGE NOTE PROVIDER - CARE PROVIDER_API CALL
Alex Gan)  Neurology; Vascular Neurology  [address] [facility]  [place] [address]  Phone: [phone]  Fax: [phone]  Follow Up Time:     Mely Theodore)  Cardiovascular Disease; Internal Medicine  [address] [place] [address]  Phone: [phone]  Fax: [phone]  Follow Up Time:

## 2022-06-15 NOTE — DISCHARGE NOTE PROVIDER - NSDCMRMEDTOKEN_GEN_ALL_CORE_FT
Rolling Walker: Dx: Stroke  traMADol 50 mg oral tablet: 0.5 tab(s) orally 3 times a day MDD:75 mg   NIFEdipine 30 mg oral tablet, extended release: 1 tab(s) orally once a day  topiramate 25 mg oral tablet: 1 tab(s) orally 2 times a day

## 2022-06-15 NOTE — DISCHARGE NOTE PROVIDER - NSDCCPCAREPLAN_GEN_ALL_CORE_FT
PRINCIPAL DISCHARGE DIAGNOSIS  Diagnosis: Syncope  Assessment and Plan of Treatment: Syncope of unclear cause at this time. Echocardiogram of heart was unremarkable. CT/MRI brain did not show sign of new stroke/bleeding. EEG did not show seizure activity while in the hospital although it is possible you may have had seizure at home which precipitated this event.  Resume Topamax and do not stop unless directed by your physician. Do not drive a vehicle as per [facility] law, f/u with neurology for close monitoring.  No sign of cardiac arrhythmia in the hospital, you refused loop recorder placement at this time. If you change your mind please follow up with Dr. Theodroe.      SECONDARY DISCHARGE DIAGNOSES  Diagnosis: Mild HTN  Assessment and Plan of Treatment: COntinue Nifedipine for blood pressure control, Follow up with your primary care physician for further monitoring in 1-2 weeks. Please call to arrange appointment.

## 2022-06-15 NOTE — PROGRESS NOTE ADULT - NSPROGADDITIONALINFOA_GEN_ALL_CORE
Differential diagnosis and plan of care discussed with patient after the evaluation.   Advanced care planning was discussed with patient and family for total of sixty mins.  Advanced care planning forms were reviewed and discussed.  OMT on six regions for acute somatic dysfunctions done at the bedside   Pain assessed and judicious use of narcotics when appropriate was discussed.  Importance of Fall prevention discussed. I had a prolonged conversation with patient. More than 50% of the visit was spent counseling and/or coordinating care by the attending physician.  total of sixty eight mins spent on total encounter. Counseling on Smoking and Alcohol cessation was offered when appropriate. Counseling on Diet, exercise, and medication compliance was done.
Differential diagnosis and plan of care discussed with patient after the evaluation.   Advanced care planning/advanced directives discussed with patient/family. DNR status including forceful chest compressions to attempt to restart the heart, ventilator support/artificial breathing, electric shock, artificial nutrition, health care proxy, Molst form all discussed with pt. Pt wishes to consider.   OMT on six regions for acute somatic dysfunctions done at the bedside   Importance of Fall prevention discussed. I had a prolonged conversation with patient. More than 50% of the visit was spent counseling and/or coordinating care by the attending physician.
- Counseling on diet, exercise, and medication adherence was done  - Counseling on smoking cessation and alcohol consumption offered when appropriate.  - Pain assessed and judicious use of narcotics when appropriate was discussed.    - Stroke education given when appropriate.  - Importance of fall prevention discussed.   - Differential diagnosis and plan of care discussed with patient and/or family and primary team
Differential diagnosis and plan of care discussed with patient after the evaluation.   Advanced care planning was discussed with patient and family for total of sixty mins.  Advanced care planning forms were reviewed and discussed.  OMT on six regions for acute somatic dysfunctions done at the bedside   Pain assessed and judicious use of narcotics when appropriate was discussed.  Importance of Fall prevention discussed. I had a prolonged conversation with patient. More than 50% of the visit was spent counseling and/or coordinating care by the attending physician.  total of sixty eight mins spent on total encounter. Counseling on Smoking and Alcohol cessation was offered when appropriate. Counseling on Diet, exercise, and medication compliance was done.

## 2022-06-29 ENCOUNTER — APPOINTMENT (OUTPATIENT)
Dept: ELECTROPHYSIOLOGY | Facility: CLINIC | Age: 57
End: 2022-06-29

## 2022-07-25 ENCOUNTER — APPOINTMENT (OUTPATIENT)
Dept: ELECTROPHYSIOLOGY | Facility: CLINIC | Age: 57
End: 2022-07-25

## 2022-09-19 NOTE — PHYSICAL THERAPY INITIAL EVALUATION ADULT - GROSSLY INTACT, SENSORY
If it doesn't come back down, she needs to be seen somewhere  Pt endorsed having left UE numbness yesterday/Grossly Intact

## 2022-11-02 NOTE — DISCHARGE NOTE NURSING/CASE MANAGEMENT/SOCIAL WORK - NSSCTYPOFSERV_GEN_ALL_CORE
Pt's V/S stable on 2L oxygen while in PACU. Denies pain, discomfort s/p EGD. Ice chips tolerated well. Spouse updated. Report given to FRED Joya on [facility]. Will transfer to room 317 shortly, safety precautions in place.   
RN visit to assess for skilled homecare needs.

## 2023-01-19 NOTE — DISCHARGE NOTE PROVIDER - NSRESEARCHGRANT_PROPHYLAXISRECOMFT_GEN_A_CORE
IMPROVE-DD Application Not Available Cimzia Pregnancy And Lactation Text: This medication crosses the placenta but can be considered safe in certain situations. Cimzia may be excreted in breast milk.

## 2023-04-25 NOTE — ED ADULT TRIAGE NOTE - GLASGOW COMA SCALE: SCORE, MLM
Recognizes 2 fingers or can read (II)/Smiles, shows teeth, opens mouth, sticks out tongue (V, VII, XI)/Normal speech (IX, X, XII)/Extraocular Eye Movement Intact  (III, IV, VI)/Shoulder shrug (XI)/Hearing intact (VIII) 15

## 2023-07-31 ENCOUNTER — INPATIENT (INPATIENT)
Facility: HOSPITAL | Age: 58
LOS: 2 days | Discharge: HOME | End: 2023-08-03
Attending: INTERNAL MEDICINE | Admitting: INTERNAL MEDICINE
Payer: COMMERCIAL

## 2023-07-31 VITALS
OXYGEN SATURATION: 99 % | RESPIRATION RATE: 18 BRPM | HEART RATE: 100 BPM | TEMPERATURE: 98 F | SYSTOLIC BLOOD PRESSURE: 160 MMHG | DIASTOLIC BLOOD PRESSURE: 90 MMHG

## 2023-07-31 DIAGNOSIS — R47.89 OTHER SPEECH DISTURBANCES: ICD-10-CM

## 2023-07-31 DIAGNOSIS — R41.82 ALTERED MENTAL STATUS, UNSPECIFIED: ICD-10-CM

## 2023-07-31 DIAGNOSIS — R56.9 UNSPECIFIED CONVULSIONS: ICD-10-CM

## 2023-07-31 DIAGNOSIS — Z29.9 ENCOUNTER FOR PROPHYLACTIC MEASURES, UNSPECIFIED: ICD-10-CM

## 2023-07-31 DIAGNOSIS — I10 ESSENTIAL (PRIMARY) HYPERTENSION: ICD-10-CM

## 2023-07-31 DIAGNOSIS — Z86.73 PERSONAL HISTORY OF TRANSIENT ISCHEMIC ATTACK (TIA), AND CEREBRAL INFARCTION WITHOUT RESIDUAL DEFICITS: ICD-10-CM

## 2023-07-31 LAB
ALBUMIN SERPL ELPH-MCNC: 4.3 G/DL — SIGNIFICANT CHANGE UP (ref 3.3–5)
ALP SERPL-CCNC: 95 U/L — SIGNIFICANT CHANGE UP (ref 40–120)
ALT FLD-CCNC: 19 U/L — SIGNIFICANT CHANGE UP (ref 4–33)
AMMONIA BLD-MCNC: 24 UMOL/L — SIGNIFICANT CHANGE UP (ref 11–55)
ANION GAP SERPL CALC-SCNC: 12 MMOL/L — SIGNIFICANT CHANGE UP (ref 7–14)
APTT BLD: 35.6 SEC — SIGNIFICANT CHANGE UP (ref 24.5–35.6)
AST SERPL-CCNC: 27 U/L — SIGNIFICANT CHANGE UP (ref 4–32)
BASE EXCESS BLDV CALC-SCNC: -2.2 MMOL/L — LOW (ref -2–3)
BASOPHILS # BLD AUTO: 0.03 K/UL — SIGNIFICANT CHANGE UP (ref 0–0.2)
BASOPHILS NFR BLD AUTO: 0.5 % — SIGNIFICANT CHANGE UP (ref 0–2)
BILIRUB SERPL-MCNC: 0.2 MG/DL — SIGNIFICANT CHANGE UP (ref 0.2–1.2)
BLOOD GAS VENOUS COMPREHENSIVE RESULT: SIGNIFICANT CHANGE UP
BUN SERPL-MCNC: 9 MG/DL — SIGNIFICANT CHANGE UP (ref 7–23)
CALCIUM SERPL-MCNC: 9 MG/DL — SIGNIFICANT CHANGE UP (ref 8.4–10.5)
CHLORIDE BLDV-SCNC: 104 MMOL/L — SIGNIFICANT CHANGE UP (ref 96–108)
CHLORIDE SERPL-SCNC: 104 MMOL/L — SIGNIFICANT CHANGE UP (ref 98–107)
CK SERPL-CCNC: 90 U/L — SIGNIFICANT CHANGE UP (ref 25–170)
CO2 BLDV-SCNC: 26.6 MMOL/L — HIGH (ref 22–26)
CO2 SERPL-SCNC: 22 MMOL/L — SIGNIFICANT CHANGE UP (ref 22–31)
CREAT SERPL-MCNC: 0.89 MG/DL — SIGNIFICANT CHANGE UP (ref 0.5–1.3)
EGFR: 76 ML/MIN/1.73M2 — SIGNIFICANT CHANGE UP
EOSINOPHIL # BLD AUTO: 0.02 K/UL — SIGNIFICANT CHANGE UP (ref 0–0.5)
EOSINOPHIL NFR BLD AUTO: 0.3 % — SIGNIFICANT CHANGE UP (ref 0–6)
ETHANOL SERPL-MCNC: <10 MG/DL — SIGNIFICANT CHANGE UP
GAS PNL BLDV: 139 MMOL/L — SIGNIFICANT CHANGE UP (ref 136–145)
GLUCOSE BLDV-MCNC: 108 MG/DL — HIGH (ref 70–99)
GLUCOSE SERPL-MCNC: 106 MG/DL — HIGH (ref 70–99)
HCO3 BLDV-SCNC: 25 MMOL/L — SIGNIFICANT CHANGE UP (ref 22–29)
HCT VFR BLD CALC: 44.3 % — SIGNIFICANT CHANGE UP (ref 34.5–45)
HCT VFR BLDA CALC: 40 % — SIGNIFICANT CHANGE UP (ref 34.5–46.5)
HGB BLD CALC-MCNC: 13.4 G/DL — SIGNIFICANT CHANGE UP (ref 11.7–16.1)
HGB BLD-MCNC: 13.8 G/DL — SIGNIFICANT CHANGE UP (ref 11.5–15.5)
IANC: 4.04 K/UL — SIGNIFICANT CHANGE UP (ref 1.8–7.4)
IMM GRANULOCYTES NFR BLD AUTO: 0.5 % — SIGNIFICANT CHANGE UP (ref 0–0.9)
INR BLD: <0.9 RATIO — SIGNIFICANT CHANGE UP (ref 0.85–1.18)
LACTATE BLDV-MCNC: 4.9 MMOL/L — CRITICAL HIGH (ref 0.5–2)
LACTATE SERPL-SCNC: 1.5 MMOL/L — SIGNIFICANT CHANGE UP (ref 0.5–2)
LYMPHOCYTES # BLD AUTO: 1.97 K/UL — SIGNIFICANT CHANGE UP (ref 1–3.3)
LYMPHOCYTES # BLD AUTO: 30.3 % — SIGNIFICANT CHANGE UP (ref 13–44)
MCHC RBC-ENTMCNC: 25.7 PG — LOW (ref 27–34)
MCHC RBC-ENTMCNC: 31.2 GM/DL — LOW (ref 32–36)
MCV RBC AUTO: 82.6 FL — SIGNIFICANT CHANGE UP (ref 80–100)
MONOCYTES # BLD AUTO: 0.41 K/UL — SIGNIFICANT CHANGE UP (ref 0–0.9)
MONOCYTES NFR BLD AUTO: 6.3 % — SIGNIFICANT CHANGE UP (ref 2–14)
NEUTROPHILS # BLD AUTO: 4.04 K/UL — SIGNIFICANT CHANGE UP (ref 1.8–7.4)
NEUTROPHILS NFR BLD AUTO: 62.1 % — SIGNIFICANT CHANGE UP (ref 43–77)
NRBC # BLD: 0 /100 WBCS — SIGNIFICANT CHANGE UP (ref 0–0)
NRBC # FLD: 0 K/UL — SIGNIFICANT CHANGE UP (ref 0–0)
PCO2 BLDV: 52 MMHG — SIGNIFICANT CHANGE UP (ref 39–52)
PH BLDV: 7.29 — LOW (ref 7.32–7.43)
PLATELET # BLD AUTO: 284 K/UL — SIGNIFICANT CHANGE UP (ref 150–400)
PO2 BLDV: 48 MMHG — HIGH (ref 25–45)
POTASSIUM BLDV-SCNC: 3.7 MMOL/L — SIGNIFICANT CHANGE UP (ref 3.5–5.1)
POTASSIUM SERPL-MCNC: 4.4 MMOL/L — SIGNIFICANT CHANGE UP (ref 3.5–5.3)
POTASSIUM SERPL-SCNC: 4.4 MMOL/L — SIGNIFICANT CHANGE UP (ref 3.5–5.3)
PROT SERPL-MCNC: 8.8 G/DL — HIGH (ref 6–8.3)
PROTHROM AB SERPL-ACNC: 9.9 SEC — SIGNIFICANT CHANGE UP (ref 9.5–13)
RBC # BLD: 5.36 M/UL — HIGH (ref 3.8–5.2)
RBC # FLD: 15.1 % — HIGH (ref 10.3–14.5)
SAO2 % BLDV: 77.8 % — SIGNIFICANT CHANGE UP (ref 67–88)
SODIUM SERPL-SCNC: 138 MMOL/L — SIGNIFICANT CHANGE UP (ref 135–145)
TROPONIN T, HIGH SENSITIVITY RESULT: 7 NG/L — SIGNIFICANT CHANGE UP
WBC # BLD: 6.5 K/UL — SIGNIFICANT CHANGE UP (ref 3.8–10.5)
WBC # FLD AUTO: 6.5 K/UL — SIGNIFICANT CHANGE UP (ref 3.8–10.5)

## 2023-07-31 PROCEDURE — 99285 EMERGENCY DEPT VISIT HI MDM: CPT

## 2023-07-31 PROCEDURE — 95720 EEG PHY/QHP EA INCR W/VEEG: CPT

## 2023-07-31 PROCEDURE — 99222 1ST HOSP IP/OBS MODERATE 55: CPT

## 2023-07-31 PROCEDURE — 99252 IP/OBS CONSLTJ NEW/EST SF 35: CPT | Mod: GC

## 2023-07-31 PROCEDURE — 70450 CT HEAD/BRAIN W/O DYE: CPT | Mod: 26,MA

## 2023-07-31 RX ORDER — ENOXAPARIN SODIUM 100 MG/ML
40 INJECTION SUBCUTANEOUS EVERY 12 HOURS
Refills: 0 | Status: DISCONTINUED | OUTPATIENT
Start: 2023-07-31 | End: 2023-08-03

## 2023-07-31 RX ORDER — NIFEDIPINE 30 MG
1 TABLET, EXTENDED RELEASE 24 HR ORAL
Refills: 0 | DISCHARGE

## 2023-07-31 RX ORDER — LEVETIRACETAM 250 MG/1
750 TABLET, FILM COATED ORAL EVERY 12 HOURS
Refills: 0 | Status: DISCONTINUED | OUTPATIENT
Start: 2023-07-31 | End: 2023-08-03

## 2023-07-31 RX ORDER — LEVETIRACETAM 250 MG/1
1000 TABLET, FILM COATED ORAL ONCE
Refills: 0 | Status: DISCONTINUED | OUTPATIENT
Start: 2023-07-31 | End: 2023-07-31

## 2023-07-31 RX ORDER — LEVETIRACETAM 250 MG/1
1000 TABLET, FILM COATED ORAL ONCE
Refills: 0 | Status: COMPLETED | OUTPATIENT
Start: 2023-07-31 | End: 2023-07-31

## 2023-07-31 RX ORDER — ATOGEPANT 10 MG/1
1 TABLET ORAL
Refills: 0 | DISCHARGE

## 2023-07-31 RX ORDER — SODIUM CHLORIDE 9 MG/ML
1000 INJECTION, SOLUTION INTRAVENOUS ONCE
Refills: 0 | Status: COMPLETED | OUTPATIENT
Start: 2023-07-31 | End: 2023-07-31

## 2023-07-31 RX ADMIN — LEVETIRACETAM 400 MILLIGRAM(S): 250 TABLET, FILM COATED ORAL at 14:56

## 2023-07-31 RX ADMIN — Medication 0.5 MILLIGRAM(S): at 14:02

## 2023-07-31 RX ADMIN — LEVETIRACETAM 400 MILLIGRAM(S): 250 TABLET, FILM COATED ORAL at 18:13

## 2023-07-31 RX ADMIN — SODIUM CHLORIDE 1333.33 MILLILITER(S): 9 INJECTION, SOLUTION INTRAVENOUS at 17:20

## 2023-07-31 RX ADMIN — Medication 2 MILLIGRAM(S): at 14:15

## 2023-07-31 RX ADMIN — ENOXAPARIN SODIUM 40 MILLIGRAM(S): 100 INJECTION SUBCUTANEOUS at 19:59

## 2023-07-31 NOTE — ED ADULT NURSE REASSESSMENT NOTE - NS ED NURSE REASSESS COMMENT FT1
Pt noted to have tonic-clonic seizure lasting for about 1 minute. Patient placed on her side and a non-rebreather at 15 L was placed on patient. Patient broke out of seizure, now responding to verbal/painful stimuli. Respirations are even and unlabored, sating at 100% on RA, VSS. Ativan given.

## 2023-07-31 NOTE — H&P ADULT - PROBLEM SELECTOR PLAN 3
L parasaggital IPH (10/2021) without residual deficits.   -Presenting with word finding difficulty and L sided weakness/numbness   -Plan as above

## 2023-07-31 NOTE — ED ADULT NURSE REASSESSMENT NOTE - NS ED NURSE REASSESS COMMENT FT1
[place] RN: pt awake and alert with seizure-like-activity, as per staff patient was had a period of unresponsiveness for a few seconds then when brought back to trauma room patient was able to answer questions correctly. Pt noted to have uncontrollable movements, Dr. Gu at bedside, no meds ordered at this time. Patient afebrile rectally, sinus tachy on cardiac monitor, VSS.

## 2023-07-31 NOTE — CONSULT NOTE ADULT - ATTENDING COMMENTS
58-year-old female with past medical history of seizures on Keppra and topiramate, CVA without residual deficits, hypertension evaluated for concern for seizures.     Patient initially code for code stroke, s/p 1 episode of GTC s/p break with ativan. MICU consulted for 1 time episode of sz. Per primary team questionable complicate with her home sz meds.    # Seizures  # Lactic acidosis  - hx of sz, 1 epidose of sz in the ED, no postal inctal,   - Currently mentating following commands and protecting her airway  - Not a micu canidate at this time. Please call back micu with questions or change in clinical status.   - F/U EEG, neuro recs.

## 2023-07-31 NOTE — H&P ADULT - NSHPPHYSICALEXAM_GEN_ALL_CORE
Vital Signs Last 24 Hrs  T(C): 37.7 (31 Jul 2023 13:52), Max: 37.7 (31 Jul 2023 13:52)  T(F): 99.9 (31 Jul 2023 13:52), Max: 99.9 (31 Jul 2023 13:52)  HR: 118 (31 Jul 2023 14:03) (88 - 118)  BP: 138/90 (31 Jul 2023 14:03) (116/42 - 160/90)  BP(mean): 69 (31 Jul 2023 13:52) (69 - 69)  RR: 25 (31 Jul 2023 14:03) (18 - 26)  SpO2: 100% (31 Jul 2023 14:03) (99% - 100%)    Parameters below as of 31 Jul 2023 14:03  Patient On (Oxygen Delivery Method): mask, nonrebreather    Physical Exam:     General: obese female laying in stretcher, obtunded, diaphoretic     Eyes: R gaze deviation     ENT: MMM    Pulm: No increased WOB. CTAB. No wheezing.     CV: RRR. S1&S2+. No M/R/G appreciated.     Abdomen: +BS. Soft, NTND. No organomegaly.     MSK: generalized shaking     Extremities: No peripheral edema or cyanosis.     Neuro: A&Ox0, generalized shaking. Unable to perform full neurological assessment      Skin: Warm and dry. No visible rash.

## 2023-07-31 NOTE — STROKE CODE NOTE - MRS SCORE
(1) No significant disability. Able to carry out all usual activities, despite some symptoms. (0) No symptoms

## 2023-07-31 NOTE — CONSULT NOTE ADULT - TIME BILLING
Reviewing the EMR, vitals, imaging, medication list, recent labs, prior records and coordinating care with medical providers. This time excludes procedures and teaching.

## 2023-07-31 NOTE — ED ADULT NURSE NOTE - NSFALLHARMRISKINTERV_ED_ALL_ED
Assistance OOB with selected safe patient handling equipment if applicable/Assistance with ambulation/Communicate risk of Fall with Harm to all staff, patient, and family/Provide visual cue: red socks, yellow wristband, yellow gown, etc/Reinforce activity limits and safety measures with patient and family/Use of alarms - bed, stretcher, chair and/or video monitoring/Bed in lowest position, wheels locked, appropriate side rails in place/Call bell, personal items and telephone in reach/Instruct patient to call for assistance before getting out of bed/chair/stretcher/Non-slip footwear applied when patient is off stretcher/[place] to call system/Physically safe environment - no spills, clutter or unnecessary equipment/Purposeful Proactive Rounding/Room/bathroom lighting operational, light cord in reach

## 2023-07-31 NOTE — H&P ADULT - HISTORY OF PRESENT ILLNESS
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 Patient unable to provide history at this time. Per EMS, patient was on the phone with a friend and experienced acute onset word finding difficulty and involuntary movements (spinning in her chair and unable to stop). Upon ambulation, EMS reports R leg was shaking making it difficult for her to walk. Brother at bedside states that patient lives alone and has reportedly been in good health, he last spoke with her yesterday.     In the ED, vital T 99.9 (rectal), , /90, RR 25 (satting 100% on NRB). Presented as code stroke, CT Stroke unremarkable. Neuro consulted, recommended additional workup including MRI and EEG. However, patient developed generalized shaking and unresponsiveness in the ED lasting >2 minutes, ED gave IV ativan 0.5mg x 1 without improvement. Keppra loaded with 2g per Neuro team. Intermittent improvement of shaking and consciousness. Able to verbalize once after painful stimulation, telling provider at bedside he was hurting her.

## 2023-07-31 NOTE — ED PROVIDER NOTE - ATTENDING CONTRIBUTION TO CARE
Attending Statement: I have personally seen and examined this patient. I have fully participated in the care of this patient. I have reviewed all pertinent clinical information, including history physical exam, plan and the Resident's note and agree except as noted  58-year-old female history of hypertension, high cholesterol, prior stroke in the past, questionable seizure in the past brought in by EMS chief complaint of word finding difficulty that started approximately at 10 AM when was last known normal.  Patient states having hard time speaking and finding her words similar to when she had a prior CVA.  On arrival also complaining of left-sided weakness.  Falls.  No chest pain no shortness of breath no nausea no vomiting.  No change in vision no slurred speech.  Code stroke was called in triage, neurology at bedside.  Vital signs noted blood pressure 160/90 heart rate 100 respiratory 18 pulse ox 99% on room air.  Patient ANO x3 able to answer questions at times has difficulty with completing a sentence.  No slurred speech appreciable.  No work of breathing.  Benign abdomen.  Decreased strength to the right leg compared to left.  Normal sensation.  While interviewing patient started to have this shaking of the right arm, neurology at bedside.  Shaking, tremulous self resolved.  Patient moved to CAT scan.  Plan labs, CT stroke protocol, neurology recommendations  12:35 PM evaluated by neurology fellow at bedside.  No thrombolytics.  Continue current Keppra and Topamax from home admitted to medicine for further work-up.  Patient has had no further shaking at this time

## 2023-07-31 NOTE — CHART NOTE - NSCHARTNOTEFT_GEN_A_CORE
EEG preliminary read (not final) on the initial recording hour(s) = x 1     No seizures recorded.  slowing noted, nonspecific.  Final report to follow tomorrow morning after completion of study.    [facility] EEG Reading Room Ph#: [phone]  Epilepsy Answering Service after 5PM and before 8:30AM: Ph#: [phone]

## 2023-07-31 NOTE — CHART NOTE - NSCHARTNOTEFT_GEN_A_CORE
Called by primary team regarding patient having a full body, generalized shaking episode. At bedside, patient with arhythmic shaking of the RUE/RLE with R gaze deviation. No verbal output initially, but does grimace & grunt to noxious stimuli. Patient received ativan 0.5 mg prior to evaluation, recommending giving additional 2 mg now. Following ativan, shaking activity lessened and patient able to follow simple command such as show me two fingers. Upon noxious stimuli, patient reports "you are hurting me." Discussed with family at bedside possible implications and management moving forward.     Recommendations     [] rEEG STAT - called tech to come down and set up   [x] Ativan 2 mg IVP STAT   [/] If seizure not broken, would give Keppra load 2G f/b 750 mg BID maintenance dosing   [] If patient not protecting her airway or requiring further sedating abortive treatments, should consider intubation  [] Continue to follow recommendations in consultation note    Patient case discussed with stroke fellow, Dr. Chaudhary, under supervision of stroke attending, Dr. Nixon De Paz.     Please call with questions: [phone]

## 2023-07-31 NOTE — ED ADULT NURSE NOTE - OBJECTIVE STATEMENT
Stroke Nurse: 57yo F w\ PMX CVA, ICH,  seizure disorder, HTN presents with word finding difficulty and R side weakness. [place] onset 10:00a today while Pt working from home. Denies pain, dizziness, nausea.

## 2023-07-31 NOTE — CONSULT NOTE ADULT - SUBJECTIVE AND OBJECTIVE BOX
HPI: Patient is a 59 yo R-H F w/ pmhx of L parasaggital IPH, possible syncope vs seizure episode (on Keppra & Topamax but noncompliant with medications as per neurologist, Dr. Gan), HTN, presents to [facility] ED as code stroke for word finding difficulty, L sided weakness/numbness. LKW approximately 10 am on 7/31. Patient history limited, so EMS providing most of initial history. Around time of LKW, patient was on the phone with a friend and experienced acute onset word finding difficulty and involuntary movements (as per EMS, patient was spinning in her chair and unable to stop). Upon ambulation, EMS reports R leg was shaking making it difficult for her to walk. Patient endorses that this feels similar to her seizure-like event in the past. Patient also currently endorsing SOB.     (Stroke only)  NIHSS: 5  MRS: 0     REVIEW OF SYSTEMS  A 10-system ROS was performed and is negative except for those items noted above and/or in the HPI.    PAST MEDICAL & SURGICAL HISTORY:  CVA (cerebrovascular accident)      No significant past surgical history        FAMILY HISTORY:  FH: hypertension (Father)      SOCIAL HISTORY:   T/E/D:   Occupation:   Lives with:     MEDICATIONS (HOME):  Home Medications:    MEDICATIONS  (STANDING):    MEDICATIONS  (PRN):    ALLERGIES/INTOLERANCES:  Allergies  Motrin (Unknown)    Intolerances    VITALS & EXAMINATION:  Vital Signs Last 24 Hrs  T(C): 36.8 (31 Jul 2023 11:56), Max: 36.8 (31 Jul 2023 11:56)  T(F): 98.3 (31 Jul 2023 11:56), Max: 98.3 (31 Jul 2023 11:56)  HR: 100 (31 Jul 2023 11:56) (100 - 100)  BP: 160/90 (31 Jul 2023 11:56) (160/90 - 160/90)  BP(mean): --  RR: 18 (31 Jul 2023 11:56) (18 - 18)  SpO2: 99% (31 Jul 2023 11:56) (99% - 99%)        General:  Constitutional: Female, appears stated age, in no apparent distress including pain  Head: Normocephalic & Atraumatic.  Respiratory: Breathing comfortably.    Neurological:  MS: Eyes open, awake, alert, oriented to person, place, situation, time. Follows all 1 step midline commands.    Language: Speech is clear, fluent with good repetition & comprehension.    CNs: PERRL (R = 3mm, L = 3mm). VFF. EOMI no nystagmus. V1-3 intact to LT b/l. No facial asymmetry b/l, full eye closure strength b/l. Hearing grossly normal (rubbing fingers) b/l. Tongue midline, normal movements, no atrophy.     Motor: Normal muscle bulk & tone. No noticeable tremor. No drift of LUE/LLE. No RUE drift, but has intermittent yet distractible episodes of non-rhythmic low frequency shaking. No effort against gravity of the RLE, but appears very effort dependent as patient has full strength on R hip extension.      Sensation: Reports anesthesia to temperature on the R side (UE/LE), however, jumps in pain to noxious stimuli in same areas.      Cortical: Extinction on DSS (neglect): none    Reflexes:              Biceps(C5)       BR(C6)     Triceps(C7)               Patellar(L4)    Achilles(S1)    Plantar Resp  R	              2	          2	             2		   2		    2		      Withdrawal   L	              2	          2	             2		   2		    2		      Withdrawal    Coordination: No dysmetria to FTN b/l.     Gait: Deferred.         LABORATORY:  CBC                       13.8   6.50  )-----------( 284      ( 31 Jul 2023 12:05 )             44.3     Chem 07-31    138  |  104  |  9   ----------------------------<  106<H>  4.4   |  22  |  0.89    Ca    9.0      31 Jul 2023 12:05    TPro  8.8<H>  /  Alb  4.3  /  TBili  0.2  /  DBili  x   /  AST  27  /  ALT  19  /  AlkPhos  95  07-31    LFTs LIVER FUNCTIONS - ( 31 Jul 2023 12:05 )  Alb: 4.3 g/dL / Pro: 8.8 g/dL / ALK PHOS: 95 U/L / ALT: 19 U/L / AST: 27 U/L / GGT: x           Coagulopathy PT/INR - ( 31 Jul 2023 12:05 )   PT: 9.9 sec;   INR: <0.90 ratio         PTT - ( 31 Jul 2023 12:05 )  PTT:35.6 sec  Lipid Panel   A1c   Cardiac enzymes     U/A Urinalysis Basic - ( 31 Jul 2023 12:05 )    Color: x / Appearance: x / SG: x / pH: x  Gluc: 106 mg/dL / Ketone: x  / Bili: x / Urobili: x   Blood: x / Protein: x / Nitrite: x   Leuk Esterase: x / RBC: x / WBC x   Sq Epi: x / Non Sq Epi: x / Bacteria: x      CSF  Immunological  Other    STUDIES & IMAGING:  Studies (EKG, EEG, EMG, etc):     Radiology (XR, CT, MR, U/S, TTE/KIANNA):    No acute intracranial hemorrhage, mass effect, or cerebral edema. Mild   chronic white matter microvascular changes. The ventricles and cortical   sulci are within normal limits for age.    No abnormal extra-axial collection. No hydrocephalus.    The calvarium is intact. Visualized portions of the paranasal sinuses are   clear.    IMPRESSION:  No acute intracranial hemorrhage, mass effect, or cerebral edema.    If there is high clinical suspicion, MRI may be more sensitive for   detection of a discrete seizure nidus.    --- End of Report ---  
CHIEF COMPLAINT: weakness    HPI:  This is a 58-year-old female with past medical history of seizures on Keppra and topiramate, CVA without residual deficits, hypertension evaluated for concern for seizures.  At around 10 AM this morning patient was having word finding difficulties and left-sided weakness and numbness.  Was brought in by EMS and found to have involuntary movements.  EMS also noted right lower extremity shaking impeding ambulation.  Patient presented to the ED and was noted to have rectal temp 99.9, heart rate 100, /90, respiratory rate 55 satting 100% on room air.  Code stroke was called, stroke work-up was unremarkable.  Patient was noted to have generalized shaking lasting greater than 2 minutes in the ED.  Neuro consulted concern for possible status epilepticus.  Patient was given 0.5 of Ativan without improvement and given additional 2 Ativan.  Was Keppra loaded.  MICU team consulted for concern for status and airway protection.      PAST MEDICAL & SURGICAL HISTORY:  CVA (cerebrovascular accident)      No significant past surgical history          FAMILY HISTORY:  FH: hypertension (Father)        SOCIAL HISTORY:  Smoking: unable to answer  EtOH Use: unable to answer  Recent Travel: denies    Allergies    Motrin (Unknown)    Intolerances        HOME MEDICATIONS:    REVIEW OF SYSTEMS:    CONSTITUTIONAL:  weakness  EYES/ENT:  No visual changes;  No vertigo or throat pain   NECK:  No pain or stiffness  RESPIRATORY:  No cough, wheezing, hemoptysis; No shortness of breath  CARDIOVASCULAR:  No chest pain or palpitations  GASTROINTESTINAL:  No abdominal or epigastric pain. No nausea, vomiting, or hematemesis; No diarrhea or constipation. No melena or hematochezia.  GENITOURINARY:  No dysuria, frequency or hematuria  NEUROLOGICAL:  +L numbness or weakness, +generalized shaking  SKIN:  No itching, rashes    OBJECTIVE:  ICU Vital Signs Last 24 Hrs  T(C): 37.7 (31 Jul 2023 13:52), Max: 37.7 (31 Jul 2023 13:52)  T(F): 99.9 (31 Jul 2023 13:52), Max: 99.9 (31 Jul 2023 13:52)  HR: 80 (31 Jul 2023 15:23) (80 - 118)  BP: 121/85 (31 Jul 2023 15:23) (116/42 - 160/90)  BP(mean): 69 (31 Jul 2023 13:52) (69 - 69)  ABP: --  ABP(mean): --  RR: 20 (31 Jul 2023 15:23) (18 - 26)  SpO2: 98% (31 Jul 2023 15:23) (98% - 100%)    O2 Parameters below as of 31 Jul 2023 15:23  Patient On (Oxygen Delivery Method): room air              CAPILLARY BLOOD GLUCOSE      POCT Blood Glucose.: 105 mg/dL (31 Jul 2023 11:58)      Vital Signs Last 24 Hrs  T(C): 37.7 (31 Jul 2023 13:52), Max: 37.7 (31 Jul 2023 13:52)  T(F): 99.9 (31 Jul 2023 13:52), Max: 99.9 (31 Jul 2023 13:52)  HR: 80 (31 Jul 2023 15:23) (80 - 118)  BP: 121/85 (31 Jul 2023 15:23) (116/42 - 160/90)  BP(mean): 69 (31 Jul 2023 13:52) (69 - 69)  RR: 20 (31 Jul 2023 15:23) (18 - 26)  SpO2: 98% (31 Jul 2023 15:23) (98% - 100%)    Parameters below as of 31 Jul 2023 15:23  Patient On (Oxygen Delivery Method): room air        General: WN/WD NAD, appears lethargic   Neurology: A&Ox3, nonfocal, intact finger squeeze  Eyes: PERRLA/ EOMI, Gross vision intact  ENT/Neck: Neck supple, trachea midline, No JVD  Respiratory: CTA B/L, No wheezing, rales, rhonchi  CV: RRR, +S1/S2, -S3/S4, no murmurs, rubs or gallops  Abdominal: Soft, NT, ND +BS, No HSM  MSK: 5/5 strength UE/LE bilaterally  Extremities: No edema, 2+ peripheral pulses  Skin: No Rashes, Hematoma, Ecchymosis    HOSPITAL MEDICATIONS:  MEDICATIONS  (STANDING):  enoxaparin Injectable 40 milliGRAM(s) SubCutaneous every 24 hours  levETIRAcetam  IVPB 750 milliGRAM(s) IV Intermittent every 12 hours    MEDICATIONS  (PRN):      LABS:                        13.8   6.50  )-----------( 284      ( 31 Jul 2023 12:05 )             44.3     07-31    138  |  104  |  9   ----------------------------<  106<H>  4.4   |  22  |  0.89    Ca    9.0      31 Jul 2023 12:05    TPro  8.8<H>  /  Alb  4.3  /  TBili  0.2  /  DBili  x   /  AST  27  /  ALT  19  /  AlkPhos  95  07-31    PT/INR - ( 31 Jul 2023 12:05 )   PT: 9.9 sec;   INR: <0.90 ratio         PTT - ( 31 Jul 2023 12:05 )  PTT:35.6 sec  Urinalysis Basic - ( 31 Jul 2023 12:05 )    Color: x / Appearance: x / SG: x / pH: x  Gluc: 106 mg/dL / Ketone: x  / Bili: x / Urobili: x   Blood: x / Protein: x / Nitrite: x   Leuk Esterase: x / RBC: x / WBC x   Sq Epi: x / Non Sq Epi: x / Bacteria: x        Venous Blood Gas:  07-31 @ 12:40  7.29/52/48/25/77.8  VBG Lactate: 4.9      MICROBIOLOGY:     RADIOLOGY:  [ ] Reviewed and interpreted by me    EKG:

## 2023-07-31 NOTE — CONSULT NOTE ADULT - ASSESSMENT
59 yo R-H F w/ pmhx of L parasaggital IPH, possible syncope vs seizure episode (on Keppra & Topamax but noncompliant with medications as per neurologist, Dr. Gan), HTN, presents to [facility] ED as code stroke for word finding difficulty, L sided weakness/numbness. LKW approximately 10 am on 7/31. Patient history limited, so EMS providing most of initial history. Around time of LKW, patient was on the phone with a friend and experienced acute onset word finding difficulty and involuntary movements (as per EMS, patient was spinning in her chair and unable to stop). Upon ambulation, EMS reports R leg was shaking making it difficult for her to walk. Patient endorses that this feels similar to her seizure-like event in the past. Patient also currently endorsing SOB. Exam as above. CTH negative for acute findings.     NIHSS: 5   mRS: 0    Patient is not a thrombolytic candidate because history of IPH and questionable exam findings.   Patient is not a mechanical thrombectomy candidate because no evidence of LVO.    Impression: Word finding difficulty & subjective R sided weakness/shaking/numbness of unclear etiology, presumably due to L brain dysfunction of unknown etiology. No evidence of IPH. Rule out focal seizure with retained awareness vs acute ischemic stroke, although the exam is questionable. If seizure, likely due to medication noncompliance vs rule out underling toxic metabolic or infectious etiologies.     Recommendations     [] vEEG   [] MRI brain w/o contrast  [] MRA H w/o contrast, MRA N w/ contrast  [] C/w home ?Keppra, Topamax; patient requires education on medication compliance  [] Neurochecks: q4hr   [] BP goals: Normotension, no need for permissive HTN at this time.   [] PT/OT evaluation   [] NPO until dysphagia screening, otherwise swallow evaluation  [] Upon discharge, patient should follow up with Dr. Gan:  [phone] [address] [place] [address]     Patient case discussed with stroke fellow, Dr. Chaudhary, under supervision of stroke attending, Dr. De Paz. Patient to be seen on morning rounds.    59 yo R-H F w/ pmhx of L parasaggital IPH, possible syncope vs seizure episode (on Keppra & Topamax but noncompliant with medications as per neurologist, Dr. Gan), HTN, presents to [facility] ED as code stroke for word finding difficulty, L sided weakness/numbness. LKW approximately 10 am on 7/31. Patient history limited, so EMS providing most of initial history. Around time of LKW, patient was on the phone with a friend and experienced acute onset word finding difficulty and involuntary movements (as per EMS, patient was spinning in her chair and unable to stop). Upon ambulation, EMS reports R leg was shaking making it difficult for her to walk. Patient endorses that this feels similar to her seizure-like event in the past. Patient also currently endorsing SOB. Exam as above. CTH negative for acute findings.     NIHSS: 5   mRS: 0    Patient is not a thrombolytic candidate because history of IPH and questionable exam findings.   Patient is not a mechanical thrombectomy candidate because no evidence of LVO.    Impression: Word finding difficulty & subjective R sided weakness/shaking/numbness of unclear etiology, presumably due to L brain dysfunction of unknown etiology. No evidence of IPH. Rule out focal seizure with retained awareness vs acute ischemic stroke, although the exam is questionable. If seizure, likely due to medication noncompliance vs rule out underling toxic metabolic or infectious etiologies.     Recommendations     [] vEEG   [] MRI brain w/o contrast  [] MRA H w/o contrast, MRA N w/ contrast  [] C/w home ?Keppra, Topamax; patient requires education on medication compliance  [] obtain levels  [] CBC, CMP, Mg, P, CK, UA, Utox, ammonia  [] DVT PPx with Lovenox 40mg subq daily  [] if patient has a convulsion, please document accurately the length of the episode, and specifically what the patient was doing paying attention to eye opening vs closure, gaze deviation, shaking of extremities, tongue bite, urinary incontinence, any derangement of vital signs  [] If patient has a generalized tonic clonic episode for >3 minutes or significant derangement of vital signs may administer Ativan 2mg IV  [] Neurochecks: q4hr   [] BP goals: Normotension, no need for permissive HTN at this time.   [] PT/OT evaluation   [] NPO until dysphagia screening, otherwise swallow evaluation  [] advise patient to not drive, operate heavy machinery, avoid heights, pools, bathtubs, locked doors.  [] Upon discharge, patient should follow up with Dr. Gan:  [phone] [address] [place] [address]     Patient case discussed with stroke fellow, Dr. Chaudhary, under supervision of stroke attending, Dr. De Paz. Patient to be seen on morning rounds.    57 yo R-H F w/ pmhx of L parasaggital IPH, possible syncope vs seizure episode (on Keppra & Topamax but noncompliant with medications as per neurologist, Dr. Gan), HTN, presents to [facility] ED as code stroke for word finding difficulty, L sided weakness/numbness. LKW approximately 10 am on 7/31. Patient history limited, so EMS providing most of initial history. Around time of LKW, patient was on the phone with a friend and experienced acute onset word finding difficulty and involuntary movements (as per EMS, patient was spinning in her chair and unable to stop). Upon ambulation, EMS reports R leg was shaking making it difficult for her to walk. Patient endorses that this feels similar to her seizure-like event in the past. Patient also currently endorsing SOB. Exam as above. CTH negative for acute findings.     NIHSS: 5   mRS: 0    Patient is not a thrombolytic candidate because history of IPH and questionable exam findings.   Patient is not a mechanical thrombectomy candidate because no evidence of LVO.    Impression: Word finding difficulty & subjective R sided weakness/shaking/numbness of unclear etiology, presumably due to L brain dysfunction of unknown etiology. No evidence of IPH. Rule out focal seizure with retained awareness vs acute ischemic stroke, although the exam is questionable. If seizure, likely due to medication noncompliance vs rule out underling toxic metabolic or infectious etiologies.     Recommendations     [] vEEG   [] MRI brain w/o contrast  [] MRA H w/o contrast, MRA N w/ contrast  [] C/w home ?Keppra, Topamax; patient requires education on medication compliance  [] obtain levels  [] CBC, CMP, Mg, P, CK, UA, Urine drug screen, EtOH level, ammonia  [] DVT PPx with Lovenox 40mg subq daily  [] if patient has a convulsion, please document accurately the length of the episode, and specifically what the patient was doing paying attention to eye opening vs closure, gaze deviation, shaking of extremities, tongue bite, urinary incontinence, any derangement of vital signs  [] If patient has a generalized tonic clonic episode for >3 minutes or significant derangement of vital signs may administer Ativan 2mg IV  [] Neurochecks: q4hr   [] BP goals: Normotension, no need for permissive HTN at this time.   [] PT/OT evaluation   [] NPO until dysphagia screening, otherwise swallow evaluation  [] advise patient to not drive, operate heavy machinery, avoid heights, pools, bathtubs, locked doors.  [] Upon discharge, patient should follow up with Dr. Gan:  [phone] [address] [place] [address]     Patient case discussed with stroke fellow, Dr. Chaudhary, under supervision of stroke attending, Dr. De Paz. Patient to be seen on morning rounds.

## 2023-07-31 NOTE — STROKE CODE NOTE - TIME PATIENT LAST KNOWN WELL
10:00 Known Pt is a pleasant 89 y/o Male with PMHx of Afib/Aflutter s/p cardioversion, HLD, Glaucoma, Hx of prostate cancer admitted w/    Metabolic Encephalopathy , back pain, increased weakness  Fever 100.5 F  UTI,   bony sclerotic lesions ,   renal 2cm lesion  - cont Rocephin  - f/u cx   - planned for oupt  Bone scan  -outpt f/u for renal lesion

## 2023-07-31 NOTE — H&P ADULT - ASSESSMENT
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 Presented as code stroke, admitted for further workup. C/c/b seizure like activity in ED.

## 2023-07-31 NOTE — H&P ADULT - PROBLEM SELECTOR PLAN 5
DVT ppx: Lovenox   Diet: NPO pending improvement in mental status. Will need dysphagia screen.   Dispo: Pending clinical improvement

## 2023-07-31 NOTE — ED PROVIDER NOTE - OBJECTIVE STATEMENT
58 y F, hx of CVA with no residual deficit, HTN, presenting from home with difficulty finding speech started around 10 am. Reports symptoms similar to the last time when she had stroke. Patient was also complaining of L sided weakness on ED arrival. Denies vision changes, vomiting. Not on blood thinner. . Code stroke activated. Patient was evaluated by Neurology.

## 2023-07-31 NOTE — H&P ADULT - PROBLEM SELECTOR PLAN 4
Prescribed nifedipine 30mg QD. Unclear if compliant.   -Hold for now pending clarification   -Continue to monitor

## 2023-07-31 NOTE — CONSULT NOTE ADULT - ASSESSMENT
58-year-old female with past medical history of seizures on Keppra and topiramate, CVA without residual deficits, hypertension evaluated for concern for seizures. Patient is  not currently a candidate for MICU admission.  Patient was seen in the ED by the MICU team for concern for airway protection and status elliptica's.  Patient was not actively seizing and appeared to be in a postictal state but was protecting her airway.  She has been treated with Ativan and Keppra load and has not had seizure since first arriving in the ED.  She is protecting her airway and is not requiring intubation, pressures are stable not requiring pressure support, does not require titratable drips.  Neurology is following and providing recommendations.    Recommendations:  [ ] f/u neuro recs  [ ] obtain keppra and topiramate levels  [ ] montior for recurrent seizure activity    Please reach out to MICU team if further concerns

## 2023-07-31 NOTE — ED PROVIDER NOTE - PROGRESS NOTE DETAILS
pt admitted to medicine, called by daughter for increase shaking of the arm. pt alert, complaining of being hot. Updated neurology. Called again to the room for increase shaking of the right side of body/face concern for a seizure. given small dose of ativan. rectal temp 99  Hosp updated. Neurology now at bedside, recommend more ativan. EEG called for urget study. pt and daughter updated.

## 2023-07-31 NOTE — ED PROVIDER NOTE - CLINICAL SUMMARY MEDICAL DECISION MAKING FREE TEXT BOX
58 y F, hx of CVA with no residual deficit, HTN, ?seizure (on topomax, keppra), presenting from home with difficulty finding speech started around 10 am. Reports symptoms similar to the last time when she had stroke. Patient was also complaining of L sided weakness on ED arrival. Denies vision changes, vomiting. Not on blood thinner. VSWNL on arrival. . Code stroke activated. Patient was evaluated by Neurology. PE as noted above. Notable for mild weakness of RLE 4/5 otherwise unremarkable neurologic exam - normal CN exam, no sensory deficit, moving extremities spontaneously. During the exam, patient's RUE was found to be shaking which was self-resolved, during which patient was completely awake and alert. DDx include but not limited to ischemic stroke vs focal seizure. Will follow up stroke labs and CT imaging and touch base with neurology to coordinate care.

## 2023-07-31 NOTE — ED PROVIDER NOTE - PHYSICAL EXAMINATION
Gen: Patient is well-appearing, NAD, AAOx3, able to follow commands, answer questions   HEENT: NCAT, EOMI, PERRLA, normal conjunctiva, tongue midline, oral mucosa moist  Lung: CTAB, no respiratory distress, no wheezes/rhonchi/rales B/L  CV: RRR, no murmurs, rubs or gallops, distal pulses 2+ b/l  Abd: soft, NT, ND, no guarding, no rigidity, no rebound tenderness  MSK: no visible deformities, ROM normal in UE/LE, strength of RLE is 4/5, LLE 5/5, RUE 5/5, LUE 5/5   Neuro: mild RLE weakness noted, No focal sensory deficits, normal CN exam   Skin: Warm, well perfused, no leg swelling  Psych: normal affect, calm

## 2023-07-31 NOTE — CONSULT NOTE ADULT - CRITICAL CARE ATTENDING COMMENT
Mirabegron extended-release tablets  What is this medicine?  MIRABEGRON (OLESYA a BEG azul) is used to treat overactive bladder. This medicine reduces the amount of bathroom visits. It may also help to control wetting accidents.  How should I use this medicine?  Take this medicine by mouth with a glass of water. Follow the directions on the prescription label. Do not cut, crush or chew this medicine. You can take it with or without food. If it upsets your stomach, take it with food. Take your medicine at regular intervals. Do not take it more often than directed. Do not stop taking except on your doctor's advice.  Talk to your pediatrician regarding the use of this medicine in children. Special care may be needed.  What side effects may I notice from receiving this medicine?  Side effects that you should report to your doctor or health care professional as soon as possible:  · allergic reactions like skin rash, itching or hives, swelling of the face, lips, or tongue  · chest pain or palpitations  · severe or sudden headache  · high blood pressure  · fast, irregular heartbeat  · redness, blistering, peeling or loosening of the skin, including inside the mouth  · signs of infection like fever or chills; cough; sore throat; pain or difficulty passing urine  · trouble passing urine or change in the amount of urine  Side effects that usually do not require medical attention (Report these to your doctor or health care professional if they continue or are bothersome.):  · constipation  · diarrhea  · dizziness  · dry eyes  · joint pain  · mild headache  · nausea  · runny nose  What may interact with this medicine?  · certain medicines for bladder problems like fesoterodine, oxybutynin, solifenacin, tolterodine  · desipramine  · digoxin  · flecainide  · ketoconazole  · MAOIs like Carbex, Eldepryl, Marplan, Nardil, and Parnate  · metoprolol  · propafenone  · thioridazine  · warfarin  What if I miss a dose?  If you miss a dose,  take it as soon as you can. If it is almost time for your next dose, take only that dose. Do not take double or extra doses.  Where should I keep my medicine?  Keep out of the reach of children.  Store at room temperature between 15 and 30 degrees C (59 and 86 degrees F). Throw away any unused medicine after the expiration date.  What should I tell my health care provider before I take this medicine?  They need to know if you have any of these conditions:  · difficulty passing urine  · high blood pressure  · kidney disease  · liver disease  · an unusual or allergic reaction to mirabegron, other medicines, foods, dyes, or preservatives  · pregnant or trying to get pregnant  · breast-feeding  What should I watch for while using this medicine?  It may take 8 weeks to notice the full benefit from this medicine.  You may need to limit your intake tea, coffee, caffeinated sodas, and alcohol. These drinks may make your symptoms worse.  Visit your doctor or health care professional for regular checks on your progress. Check your blood pressure as directed. Ask your doctor or health care professional what your blood pressure should be and when you should contact him or her.  NOTE:This sheet is a summary. It may not cover all possible information. If you have questions about this medicine, talk to your doctor, pharmacist, or health care provider. Copyright© 2017 [employer]         code stroke was called and neurology emergently assessed jazmin   DOS 8/1  Navdeep   57 yo R-H F w/  L parasaggital IPH, possible syncope vs seizure episode (on Keppra & Topamax but hx non adherance in past), HTN, presents to [facility] ED as code stroke for word finding difficulty,   weakness/numbness. LKW approximately 10 am on 7/31. Patient history limited, so EMS providing most of initial history. Around time of LKW, patient was on the phone with a friend and experienced acute onset word finding difficulty and involuntary movements (as per EMS, patient was spinning in her chair and unable to stop). Upon ambulation, EMS reports R leg was shaking making it difficult for her to walk. Patient endorses that this feels similar to her seizure-like event in the past. Patient also currently endorsing SOB. Exam as above.   Arrival: NIHSS: 5 ; mRS: 0  Patient is not a thrombolytic candidate because history of IPH and questionable exam findings.   Patient is not a mechanical thrombectomy candidate because no evidence of LVO.  6/2022: MRI brain w/ and w/o: chronic L pareital hemorrhage   7/31 CTH:  negative for acute findings.   8/1 on exam with RHP, poor effort on exam   LDL 71 A1c 5.6   EEG neg     Impression: Word finding difficulty & subjective R sided weakness/shaking/numbness of unclear etiology, presumably due to L brain dysfunction of unknown etiology has prior L IPH.   Rule out focal seizure with retained awareness vs acute ischemic stroke, although the exam is questionable. If seizure, likely due to medication noncompliance vs rule out underling toxic metabolic or infectious etiologies.   posible todds paralysis on R   concern for functional exam but needs further workup     Recommendations     - vEEG   - MRI brain w/ and w/o contrast  - MRA H w/o contrast, MRA N w/ contrast  - c/w keppra 750mg bid (she states she may have missed a dose or 2) ; stopped topamax    - if patient has a convulsion, please document accurately the length of the episode, and specifically what the patient was doing paying attention to eye opening vs closure, gaze deviation, shaking of extremities, tongue bite, urinary incontinence, any derangement of vital signs  - If patient has a generalized tonic clonic episode for >3 minutes or significant derangement of vital signs may administer Ativan 2mg IV  - Neurochecks: q4hr   - BP goals: Normotension, no need for permissive HTN at this time.   - PT/OT evaluation   - NPO until dysphagia screening, otherwise swallow evaluation  - advise patient to not drive, operate heavy machinery, avoid heights, pools, bathtubs, locked doors.   - TTE  - telemetry  - PT/OT/SS/SLP, OOBC  - check FS, glucose control <180  - GI/DVT ppx  - Counseling on diet, exercise, and medication adherence was done  - Counseling on smoking cessation and alcohol consumption offered when appropriate.  - Pain assessed and judicious use of narcotics when appropriate was discussed.    - Stroke education given when appropriate.  - Importance of fall prevention discussed.   - Differential diagnosis and plan of care discussed with patient and/or family and primary team  - Thank you for allowing me to participate in the care of this patient. Call with questions.  - Upon discharge, patient should follow up with Dr. Gan:  [phone] [address] [place] [address]     Alex Gan MD  Vascular Neurology  Office: [phone]

## 2023-07-31 NOTE — H&P ADULT - PROBLEM SELECTOR PLAN 1
Pt with questionable seizure vs syncopal episode in 6/2022, started on ?topiramate/keppra but reportedly noncompliant. Now presenting with acute word finding difficulty and L sided deficits. Episode of generalized shaking with R gaze deviation and unresponsiveness in ED.   -S/p IV ativan 0.5mg in ED without improvement in seizure   -Neuro consulted per ED, recs appreciated   -Keppra loaded with 2g per Neuro due to concern for status epilepticus  -C/w Keppra 750mg BID   -STAT EEG   -Labs including: CBC, CMP, Mg, P, CK, UA, Urine drug screen, EtOH level, ammonia, keppra/topiramate levels   -NPO pending improvement of mental status, then dysphagia screen  -MICU consulted due to concern for airway protection - to see   -Aspiration/seizure precautions

## 2023-07-31 NOTE — H&P ADULT - PROBLEM SELECTOR PLAN 2
Patient intermittently unable to follow commands and unable to verbalize currently. Initially presented with acute word finding difficulty and L sided deficits since 10AM. Possibly in status epilepticus vs post-ictal vs CVA.   -Code stroke on presentation - CT Stroke without e/o ICH  -Neuro consulted in ED, recommend work up of seizure like activity as above   -MRI brain w/o contrast  -MRA H w/o contrast, MRA N w/ contrast  -Neurochecks q4H

## 2023-07-31 NOTE — ED ADULT TRIAGE NOTE - CHIEF COMPLAINT QUOTE
Pt brought in by EMS from home complaining of L sided weakness and difficulty speaking/ finding words. Pt has a hx of cva. Pts last known normal was 2 hours ago. code stroke called

## 2023-07-31 NOTE — H&P ADULT - NSHPLABSRESULTS_GEN_ALL_CORE
13.8   6.50  )-----------( 284      ( 31 Jul 2023 12:05 )             44.3       07-31    138  |  104  |  9   ----------------------------<  106<H>  4.4   |  22  |  0.89    Ca    9.0      31 Jul 2023 12:05    TPro  8.8<H>  /  Alb  4.3  /  TBili  0.2  /  DBili  x   /  AST  27  /  ALT  19  /  AlkPhos  95  07-31              Urinalysis Basic - ( 31 Jul 2023 12:05 )    Color: x / Appearance: x / SG: x / pH: x  Gluc: 106 mg/dL / Ketone: x  / Bili: x / Urobili: x   Blood: x / Protein: x / Nitrite: x   Leuk Esterase: x / RBC: x / WBC x   Sq Epi: x / Non Sq Epi: x / Bacteria: x        PT/INR - ( 31 Jul 2023 12:05 )   PT: 9.9 sec;   INR: <0.90 ratio         PTT - ( 31 Jul 2023 12:05 )  PTT:35.6 sec    Lactate Trend            CAPILLARY BLOOD GLUCOSE      POCT Blood Glucose.: 105 mg/dL (31 Jul 2023 11:58)

## 2023-07-31 NOTE — ED PROVIDER NOTE - NS ED ROS FT
Constitutional:  (-) fever, (-) chills, (-) lethargy  Eyes:  (-) eye pain (-) visual changes  ENMT: (-) nasal discharge, (-) sore throat. (-) neck pain or stiffness  Cardiac: (-) chest pain (-) palpitations  Respiratory:  (-) cough (-) respiratory distress.   GI:  (-) nausea (-) vomiting (-) diarrhea (-) abdominal pain.  :  (-) dysuria (-) frequency (-) burning.  MS:  (-) back pain (-) joint pain.  Neuro: (+) difficulty finding speech (-) headache (-) numbness (-) tingling (+) focal weakness  Skin:  (-) rash  Except as documented in the HPI,  all other systems are negative

## 2023-08-01 LAB
A1C WITH ESTIMATED AVERAGE GLUCOSE RESULT: 5.6 % — SIGNIFICANT CHANGE UP (ref 4–5.6)
ALBUMIN SERPL ELPH-MCNC: 3.7 G/DL — SIGNIFICANT CHANGE UP (ref 3.3–5)
ALP SERPL-CCNC: 75 U/L — SIGNIFICANT CHANGE UP (ref 40–120)
ALT FLD-CCNC: 14 U/L — SIGNIFICANT CHANGE UP (ref 4–33)
ANION GAP SERPL CALC-SCNC: 12 MMOL/L — SIGNIFICANT CHANGE UP (ref 7–14)
AST SERPL-CCNC: 15 U/L — SIGNIFICANT CHANGE UP (ref 4–32)
BILIRUB SERPL-MCNC: 0.3 MG/DL — SIGNIFICANT CHANGE UP (ref 0.2–1.2)
BUN SERPL-MCNC: 8 MG/DL — SIGNIFICANT CHANGE UP (ref 7–23)
CALCIUM SERPL-MCNC: 8.9 MG/DL — SIGNIFICANT CHANGE UP (ref 8.4–10.5)
CHLORIDE SERPL-SCNC: 105 MMOL/L — SIGNIFICANT CHANGE UP (ref 98–107)
CHOLEST SERPL-MCNC: 145 MG/DL — SIGNIFICANT CHANGE UP
CO2 SERPL-SCNC: 24 MMOL/L — SIGNIFICANT CHANGE UP (ref 22–31)
CREAT SERPL-MCNC: 0.9 MG/DL — SIGNIFICANT CHANGE UP (ref 0.5–1.3)
EGFR: 75 ML/MIN/1.73M2 — SIGNIFICANT CHANGE UP
ESTIMATED AVERAGE GLUCOSE: 114 — SIGNIFICANT CHANGE UP
GLUCOSE SERPL-MCNC: 87 MG/DL — SIGNIFICANT CHANGE UP (ref 70–99)
HCT VFR BLD CALC: 38.2 % — SIGNIFICANT CHANGE UP (ref 34.5–45)
HDLC SERPL-MCNC: 46 MG/DL — LOW
HGB BLD-MCNC: 11.7 G/DL — SIGNIFICANT CHANGE UP (ref 11.5–15.5)
LIPID PNL WITH DIRECT LDL SERPL: 71 MG/DL — SIGNIFICANT CHANGE UP
MAGNESIUM SERPL-MCNC: 2.3 MG/DL — SIGNIFICANT CHANGE UP (ref 1.6–2.6)
MCHC RBC-ENTMCNC: 25.5 PG — LOW (ref 27–34)
MCHC RBC-ENTMCNC: 30.6 GM/DL — LOW (ref 32–36)
MCV RBC AUTO: 83.2 FL — SIGNIFICANT CHANGE UP (ref 80–100)
NON HDL CHOLESTEROL: 99 MG/DL — SIGNIFICANT CHANGE UP
NRBC # BLD: 0 /100 WBCS — SIGNIFICANT CHANGE UP (ref 0–0)
NRBC # FLD: 0 K/UL — SIGNIFICANT CHANGE UP (ref 0–0)
PHOSPHATE SERPL-MCNC: 2.9 MG/DL — SIGNIFICANT CHANGE UP (ref 2.5–4.5)
PLATELET # BLD AUTO: 237 K/UL — SIGNIFICANT CHANGE UP (ref 150–400)
POTASSIUM SERPL-MCNC: 3.4 MMOL/L — LOW (ref 3.5–5.3)
POTASSIUM SERPL-SCNC: 3.4 MMOL/L — LOW (ref 3.5–5.3)
PROT SERPL-MCNC: 7.3 G/DL — SIGNIFICANT CHANGE UP (ref 6–8.3)
RBC # BLD: 4.59 M/UL — SIGNIFICANT CHANGE UP (ref 3.8–5.2)
RBC # FLD: 15.2 % — HIGH (ref 10.3–14.5)
SODIUM SERPL-SCNC: 141 MMOL/L — SIGNIFICANT CHANGE UP (ref 135–145)
TRIGL SERPL-MCNC: 142 MG/DL — SIGNIFICANT CHANGE UP
WBC # BLD: 6.67 K/UL — SIGNIFICANT CHANGE UP (ref 3.8–10.5)
WBC # FLD AUTO: 6.67 K/UL — SIGNIFICANT CHANGE UP (ref 3.8–10.5)

## 2023-08-01 PROCEDURE — 71045 X-RAY EXAM CHEST 1 VIEW: CPT | Mod: 26

## 2023-08-01 RX ORDER — SODIUM CHLORIDE 9 MG/ML
1000 INJECTION INTRAMUSCULAR; INTRAVENOUS; SUBCUTANEOUS
Refills: 0 | Status: DISCONTINUED | OUTPATIENT
Start: 2023-08-01 | End: 2023-08-03

## 2023-08-01 RX ORDER — POTASSIUM CHLORIDE 20 MEQ
10 PACKET (EA) ORAL
Refills: 0 | Status: COMPLETED | OUTPATIENT
Start: 2023-08-01 | End: 2023-08-01

## 2023-08-01 RX ADMIN — ENOXAPARIN SODIUM 40 MILLIGRAM(S): 100 INJECTION SUBCUTANEOUS at 17:10

## 2023-08-01 RX ADMIN — LEVETIRACETAM 400 MILLIGRAM(S): 250 TABLET, FILM COATED ORAL at 17:10

## 2023-08-01 RX ADMIN — Medication 100 MILLIEQUIVALENT(S): at 12:28

## 2023-08-01 RX ADMIN — SODIUM CHLORIDE 35 MILLILITER(S): 9 INJECTION INTRAMUSCULAR; INTRAVENOUS; SUBCUTANEOUS at 11:15

## 2023-08-01 RX ADMIN — Medication 100 MILLIEQUIVALENT(S): at 11:25

## 2023-08-01 RX ADMIN — ENOXAPARIN SODIUM 40 MILLIGRAM(S): 100 INJECTION SUBCUTANEOUS at 06:49

## 2023-08-01 RX ADMIN — LEVETIRACETAM 400 MILLIGRAM(S): 250 TABLET, FILM COATED ORAL at 06:49

## 2023-08-01 RX ADMIN — Medication 100 MILLIEQUIVALENT(S): at 10:23

## 2023-08-01 NOTE — SWALLOW BEDSIDE ASSESSMENT ADULT - COMMENTS
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 Presented as code stroke, admitted for further workup. C/c/b seizure like activity in ED."     Neuro Note 7/31: "Impression: Word finding difficulty & subjective R sided weakness/shaking/numbness of unclear etiology, presumably due to L brain dysfunction of unknown etiology. No evidence of IPH. Rule out focal seizure with retained awareness vs acute ischemic stroke, although the exam is questionable. If seizure, likely due to medication noncompliance vs rule out underling toxic metabolic or infectious etiologies."    No chest imaging results available at this time.     Patient seen awake/alert and oriented state during clinical swallow evaluation this AM. Patient denies difficulty swallowing and states she eats a regular diet at home such as chicken, bread, rice. 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 Presented as code stroke, admitted for further workup. C/c/b seizure like activity in ED."     Neuro Note 7/31: "Impression: Word finding difficulty & subjective R sided weakness/shaking/numbness of unclear etiology, presumably due to L brain dysfunction of unknown etiology. No evidence of IPH. Rule out focal seizure with retained awareness vs acute ischemic stroke, although the exam is questionable. If seizure, likely due to medication noncompliance vs rule out underling toxic metabolic or infectious etiologies."    CT Head 7/31: "IMPRESSION: No acute hemorrhage, mass effect or extra-axial collections."    No chest imaging results available at this time.     Patient seen awake/alert and oriented state during clinical swallow evaluation this AM. Patient denies difficulty swallowing and states she eats a regular diet at home such as chicken, bread, rice.

## 2023-08-01 NOTE — SWALLOW BEDSIDE ASSESSMENT ADULT - ADDITIONAL RECOMMENDATIONS
Monitor for any evolving neurological changes that may impact oral intake given stroke workup in progress. This department to follow up as schedule permits to assess for diet tolerance. Medical team further advised to reconsult if there is a change in medical status and/or observed change in patient's tolerance of recommended PO diet.

## 2023-08-01 NOTE — SPEECH LANGUAGE PATHOLOGY EVALUATION - COMMENTS
Monitor patient for any evolving neurological change that may impact on speech/language output warranting a reconsult to reassess for speech/language skills. 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 Presented as code stroke, admitted for further workup. C/c/b seizure like activity in ED."     Neuro Note 7/31: "Impression: Word finding difficulty & subjective R sided weakness/shaking/numbness of unclear etiology, presumably due to L brain dysfunction of unknown etiology. No evidence of IPH. Rule out focal seizure with retained awareness vs acute ischemic stroke, although the exam is questionable. If seizure, likely due to medication noncompliance vs rule out underling toxic metabolic or infectious etiologies."    Patient seen awake/alert and oriented state during speech and language evaluation. Patient expressed that speech/language output is back to baseline and denies any current difficulty.

## 2023-08-01 NOTE — SWALLOW BEDSIDE ASSESSMENT ADULT - ASR SWALLOW RECOMMEND DIAG
Objective testing NOT warranted at this time given no overt signs of aspiration and no available chest imaging at this time

## 2023-08-01 NOTE — EEG REPORT - NS EEG TEXT BOX
[facility]  Comprehensive Epilepsy Center  Report of Continuous Video EEG    [facility]: [address], [place] [address], Phone [phone]  [place] Office: [address], [address], [place] [address] Phone [phone]    [facility]: [address] [place] [address], Phone [phone]  [place] Office: [address] [place] [address], Phone [phone]    Patient Name: HARRIETT SHANNON  Age: 57 year, : 1965    Study Time/Date:  18:04 23  End Time/Date: 03:21 2023			   Duration:  7:10Hr    Study Information:    EEG Recording Technique:  The patient underwent continuous Video-EEG monitoring, using Telemetry System hardware on the XLTek Digital System. EEG and video data were stored on a computer hard drive with important events saved in digital archive files. The material was reviewed by a physician (electroencephalographer / epileptologist) on a daily basis. David and seizure detection algorithms were utilized and reviewed. An EEG Technician attended to the patient, and was available throughout daytime work hours.  The epilepsy center neurologist was available in person or on call 24-hours per day.    EEG Placement and Labeling of Electrodes:  The EEG was performed utilizing 20 channel referential EEG connections (coronal over temporal over parasagittal montage) using all standard 10-20 electrode placements, with additional electrodes placed in the inferior temporal region using the modified 10-10 montage electrode placements for elective admissions, or if deemed necessary. Recording was at a sampling rate of 256 samples per second per channel. Time synchronized digital video recording was done simultaneously with EEG recording. A low light infrared camera was used for low light recording.     History:   VEEG AT [id number]  58 YEAR OLD FEMALE AMS    MEDICATIONS  (STANDING):  enoxaparin Injectable 40 milliGRAM(s) SubCutaneous every 12 hours  levETIRAcetam  IVPB 750 milliGRAM(s) IV Intermittent every 12 hours  potassium chloride  10 mEq/100 mL IVPB 10 milliEquivalent(s) IV Intermittent every 1 hour      Interpretation:    Daily EEG Visual Analysis  Findings: The background was continuous and reactive. During wakefulness, the posterior dominant rhythm consisted of symmetric, well-modulated 9 Hz activity, with amplitude to 30 uV, that attenuated to eye opening.     Background Slowing:  No generalized background slowing was present.    Focal Slowing:   None were present.    Sleep Background:  Drowsiness was characterized by fragmentation, attenuation, and slowing of the background activity.    Sleep was characterized by the presence of vertex waves, symmetric sleep spindles and K-complexes.    Other Non-Epileptiform Findings:  None were present.    Interictal Epileptiform Activity:   None were present.    Events:  Clinical events: None recorded.  Seizures: None recorded.    Activation Procedures:   Hyperventilation was not performed.    Photic stimulation was not performed.     Artifacts:  Intermittent myogenic and movement artifacts were noted.    EEG Summary / Classification:  Normal EEG in the awake, drowsy and asleep states.    EEG Impression / Clinical Correlate:  Normal EEG study.  No epileptiform pattern or seizure seen.  EEG similar to prior study 2022    **In absence of additional clinical concerns, recommend consideration for discontinuation of current EEG study with reconnection in future if warranted.    Phil Yen MD  Attending Physician, [facility] Epilepsy [place]   [facility]  Comprehensive Epilepsy Center  Report of Continuous Video EEG    [facility]: [address], [place] [address], Phone [phone]  [place] Office: [address], [address], [place] [address] Phone [phone]    [facility]: [address] [place] [address], Phone [phone]  [place] Office: [address] [place] [address], Phone [phone]    Patient Name: HARRIETT SHANNON  Age: 57 year, : 1965    Study Time/Date:  18:04 23  End Time/Date: 03:21 then 08:00-10:11 2023			   Duration:  10:22Hr    Study Information:    EEG Recording Technique:  The patient underwent continuous Video-EEG monitoring, using Telemetry System hardware on the XLTek Digital System. EEG and video data were stored on a computer hard drive with important events saved in digital archive files. The material was reviewed by a physician (electroencephalographer / epileptologist) on a daily basis. David and seizure detection algorithms were utilized and reviewed. An EEG Technician attended to the patient, and was available throughout daytime work hours.  The epilepsy center neurologist was available in person or on call 24-hours per day.    EEG Placement and Labeling of Electrodes:  The EEG was performed utilizing 20 channel referential EEG connections (coronal over temporal over parasagittal montage) using all standard 10-20 electrode placements, with additional electrodes placed in the inferior temporal region using the modified 10-10 montage electrode placements for elective admissions, or if deemed necessary. Recording was at a sampling rate of 256 samples per second per channel. Time synchronized digital video recording was done simultaneously with EEG recording. A low light infrared camera was used for low light recording.     History:   VEEG AT [id number]  58 YEAR OLD FEMALE AMS    MEDICATIONS  (STANDING):  enoxaparin Injectable 40 milliGRAM(s) SubCutaneous every 12 hours  levETIRAcetam  IVPB 750 milliGRAM(s) IV Intermittent every 12 hours  potassium chloride  10 mEq/100 mL IVPB 10 milliEquivalent(s) IV Intermittent every 1 hour      Interpretation:    Daily EEG Visual Analysis  Findings: The background was continuous and reactive. During wakefulness, the posterior dominant rhythm consisted of symmetric, well-modulated 9 Hz activity, with amplitude to 30 uV, that attenuated to eye opening.     Background Slowing:  No generalized background slowing was present.    Focal Slowing:   None were present.    Sleep Background:  Drowsiness was characterized by fragmentation, attenuation, and slowing of the background activity.    Sleep was characterized by the presence of vertex waves, symmetric sleep spindles and K-complexes.    Other Non-Epileptiform Findings:  None were present.    Interictal Epileptiform Activity:   None were present.    Events:  Clinical events: None recorded.  Seizures: None recorded.    Activation Procedures:   Hyperventilation was not performed.    Photic stimulation was not performed.     Artifacts:  Intermittent myogenic and movement artifacts were noted.    EEG Summary / Classification:  Normal EEG in the awake, drowsy and asleep states.    EEG Impression / Clinical Correlate:  Normal EEG study.  No epileptiform pattern or seizure seen.  EEG similar to prior study 2022    **In absence of additional clinical concerns, recommend consideration for discontinuation of current EEG study with reconnection in future if warranted.    Phil Yen MD  Attending Physician, [facility] Epilepsy [place]

## 2023-08-01 NOTE — SWALLOW BEDSIDE ASSESSMENT ADULT - SWALLOW EVAL: DIAGNOSIS
1- Functional oral stage for puree, regular solids, and thin liquids marked by adequate oral containment, adequate bolus manipulation, adequate mastication, adequate anterior to posterior transfer, and adequate oral clearance. 2- Functional pharyngeal stage for puree, regular solids, and thin liquids marked by initiation of the pharyngeal swallow with hyolaryngeal excursion upon palpation without evidence of impaired airway protection,

## 2023-08-01 NOTE — SPEECH LANGUAGE PATHOLOGY EVALUATION - SLP DIAGNOSIS
Patient presents with Functional Receptive/Expressive Language Skills characterized by adequate auditory comprehension (e.g. Following Commands, Yes/No Questions), adequate repetition skills (monosyllabic, multisyllabic, sentences), fluent language output and no word finding deficits. Patient presents with functional speech output at the sentence/conversational.

## 2023-08-02 ENCOUNTER — TRANSCRIPTION ENCOUNTER (OUTPATIENT)
Age: 58
End: 2023-08-02

## 2023-08-02 LAB
24R-OH-CALCIDIOL SERPL-MCNC: 18 NG/ML — LOW (ref 30–80)
A1C WITH ESTIMATED AVERAGE GLUCOSE RESULT: 5.6 % — SIGNIFICANT CHANGE UP (ref 4–5.6)
ANION GAP SERPL CALC-SCNC: 8 MMOL/L — SIGNIFICANT CHANGE UP (ref 7–14)
BUN SERPL-MCNC: 8 MG/DL — SIGNIFICANT CHANGE UP (ref 7–23)
CALCIUM SERPL-MCNC: 8.5 MG/DL — SIGNIFICANT CHANGE UP (ref 8.4–10.5)
CHLORIDE SERPL-SCNC: 110 MMOL/L — HIGH (ref 98–107)
CHOLEST SERPL-MCNC: 137 MG/DL — SIGNIFICANT CHANGE UP
CO2 SERPL-SCNC: 23 MMOL/L — SIGNIFICANT CHANGE UP (ref 22–31)
CREAT SERPL-MCNC: 0.93 MG/DL — SIGNIFICANT CHANGE UP (ref 0.5–1.3)
EGFR: 72 ML/MIN/1.73M2 — SIGNIFICANT CHANGE UP
ESTIMATED AVERAGE GLUCOSE: 114 — SIGNIFICANT CHANGE UP
FOLATE SERPL-MCNC: 10.1 NG/ML — SIGNIFICANT CHANGE UP (ref 3.1–17.5)
GLUCOSE SERPL-MCNC: 92 MG/DL — SIGNIFICANT CHANGE UP (ref 70–99)
HCT VFR BLD CALC: 37.4 % — SIGNIFICANT CHANGE UP (ref 34.5–45)
HDLC SERPL-MCNC: 46 MG/DL — LOW
HGB BLD-MCNC: 11.5 G/DL — SIGNIFICANT CHANGE UP (ref 11.5–15.5)
LACTATE SERPL-SCNC: 0.7 MMOL/L — SIGNIFICANT CHANGE UP (ref 0.5–2)
LIPID PNL WITH DIRECT LDL SERPL: 71 MG/DL — SIGNIFICANT CHANGE UP
MAGNESIUM SERPL-MCNC: 2.4 MG/DL — SIGNIFICANT CHANGE UP (ref 1.6–2.6)
MCHC RBC-ENTMCNC: 25.7 PG — LOW (ref 27–34)
MCHC RBC-ENTMCNC: 30.7 GM/DL — LOW (ref 32–36)
MCV RBC AUTO: 83.5 FL — SIGNIFICANT CHANGE UP (ref 80–100)
NON HDL CHOLESTEROL: 91 MG/DL — SIGNIFICANT CHANGE UP
NRBC # BLD: 0 /100 WBCS — SIGNIFICANT CHANGE UP (ref 0–0)
NRBC # FLD: 0 K/UL — SIGNIFICANT CHANGE UP (ref 0–0)
PHOSPHATE SERPL-MCNC: 3.1 MG/DL — SIGNIFICANT CHANGE UP (ref 2.5–4.5)
PLATELET # BLD AUTO: 245 K/UL — SIGNIFICANT CHANGE UP (ref 150–400)
POTASSIUM SERPL-MCNC: 3.8 MMOL/L — SIGNIFICANT CHANGE UP (ref 3.5–5.3)
POTASSIUM SERPL-SCNC: 3.8 MMOL/L — SIGNIFICANT CHANGE UP (ref 3.5–5.3)
RBC # BLD: 4.48 M/UL — SIGNIFICANT CHANGE UP (ref 3.8–5.2)
RBC # FLD: 15.4 % — HIGH (ref 10.3–14.5)
SODIUM SERPL-SCNC: 141 MMOL/L — SIGNIFICANT CHANGE UP (ref 135–145)
TOPIRAMATE SERPL-MCNC: <1 MCG/ML — SIGNIFICANT CHANGE UP
TRIGL SERPL-MCNC: 98 MG/DL — SIGNIFICANT CHANGE UP
VIT B12 SERPL-MCNC: 297 PG/ML — SIGNIFICANT CHANGE UP (ref 200–900)
WBC # BLD: 6.22 K/UL — SIGNIFICANT CHANGE UP (ref 3.8–10.5)
WBC # FLD AUTO: 6.22 K/UL — SIGNIFICANT CHANGE UP (ref 3.8–10.5)

## 2023-08-02 PROCEDURE — 70551 MRI BRAIN STEM W/O DYE: CPT | Mod: 26

## 2023-08-02 PROCEDURE — 93306 TTE W/DOPPLER COMPLETE: CPT | Mod: 26

## 2023-08-02 PROCEDURE — 70548 MR ANGIOGRAPHY NECK W/DYE: CPT | Mod: 26

## 2023-08-02 PROCEDURE — 70544 MR ANGIOGRAPHY HEAD W/O DYE: CPT | Mod: 26,59

## 2023-08-02 RX ORDER — LEVETIRACETAM 250 MG/1
1 TABLET, FILM COATED ORAL
Refills: 0 | DISCHARGE

## 2023-08-02 RX ORDER — CHOLECALCIFEROL (VITAMIN D3) 125 MCG
2000 CAPSULE ORAL
Qty: 0 | Refills: 0 | DISCHARGE
Start: 2023-08-02

## 2023-08-02 RX ORDER — LEVETIRACETAM 250 MG/1
1 TABLET, FILM COATED ORAL
Qty: 60 | Refills: 0
Start: 2023-08-02 | End: 2023-08-31

## 2023-08-02 RX ORDER — CHOLECALCIFEROL (VITAMIN D3) 125 MCG
2000 CAPSULE ORAL DAILY
Refills: 0 | Status: DISCONTINUED | OUTPATIENT
Start: 2023-08-02 | End: 2023-08-03

## 2023-08-02 RX ORDER — NIFEDIPINE 30 MG
1 TABLET, EXTENDED RELEASE 24 HR ORAL
Refills: 0 | DISCHARGE

## 2023-08-02 RX ADMIN — LEVETIRACETAM 400 MILLIGRAM(S): 250 TABLET, FILM COATED ORAL at 23:08

## 2023-08-02 RX ADMIN — ENOXAPARIN SODIUM 40 MILLIGRAM(S): 100 INJECTION SUBCUTANEOUS at 23:07

## 2023-08-02 RX ADMIN — ENOXAPARIN SODIUM 40 MILLIGRAM(S): 100 INJECTION SUBCUTANEOUS at 05:14

## 2023-08-02 RX ADMIN — Medication 2000 UNIT(S): at 12:15

## 2023-08-02 RX ADMIN — LEVETIRACETAM 400 MILLIGRAM(S): 250 TABLET, FILM COATED ORAL at 05:14

## 2023-08-02 NOTE — PROGRESS NOTE ADULT - ASSESSMENT
59 yo R-H F w/  L parasaggital IPH, possible syncope vs seizure episode (on Keppra & Topamax but hx non adherance in past), HTN, presents to [facility] ED as code stroke for word finding difficulty,   weakness/numbness. LKW approximately 10 am on 7/31. Patient history limited, so EMS providing most of initial history. Around time of LKW, patient was on the phone with a friend and experienced acute onset word finding difficulty and involuntary movements (as per EMS, patient was spinning in her chair and unable to stop). Upon ambulation, EMS reports R leg was shaking making it difficult for her to walk. Patient endorses that this feels similar to her seizure-like event in the past. Patient also currently endorsing SOB. Exam as above.   Arrival: NIHSS: 5 ; mRS: 0  Patient is not a thrombolytic candidate because history of IPH and questionable exam findings.   Patient is not a mechanical thrombectomy candidate because no evidence of LVO.  6/2022: MRI brain w/ and w/o: chronic L pareital hemorrhage   7/31 CTH:  negative for acute findings.   8/1 on exam with RHP, poor effort on exam   LDL 71 A1c 5.6   EEG neg   MRI brain neg  MRA H/N neg   8/1 EEG neg     Impression: Word finding difficulty & subjective R sided weakness/shaking/numbness of unclear etiology, presumably due to L brain dysfunction of unknown etiology has prior L IPH.   Rule out focal seizure with retained awareness vs acute ischemic stroke, although the exam is questionable. If seizure, likely due to medication noncompliance vs rule out underling toxic metabolic or infectious etiologies.   posible todds paralysis on R   concern for functional exam but needs further workup     Recommendations      - psych f/u ; concern for functional exam   - c/w keppra 750mg bid (she states she may have missed a dose or 2) ; stopped topamax    - if patient has a convulsion, please document accurately the length of the episode, and specifically what the patient was doing paying attention to eye opening vs closure, gaze deviation, shaking of extremities, tongue bite, urinary incontinence, any derangement of vital signs  - If patient has a generalized tonic clonic episode for >3 minutes or significant derangement of vital signs may administer Ativan 2mg IV  - Neurochecks: q4hr   - BP goals: Normotension, no need for permissive HTN at this time.   - PT/OT evaluation   - NPO until dysphagia screening, otherwise swallow evaluation  - advise patient to not drive, operate heavy machinery, avoid heights, pools, bathtubs, locked doors.   - telemetry  - PT/OT/SS/SLP, OOBC  - check FS, glucose control <180  - GI/DVT ppx     - Upon discharge, patient should follow up with Dr. Gan:  [phone] [address] [place] [address]     Alex Gan MD  Vascular Neurology  Office: [phone].

## 2023-08-02 NOTE — DIETITIAN INITIAL EVALUATION ADULT - OTHER INFO
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 Presented as code stroke, admitted for further workup.    Pt off the floor to MRI at the time of visit, collateral obtained from chart review and RN. Will obtain interview during f/u. As per RN pt consuming 75% meals with No GI distress (nausea/vomiting/diarrhea/constipation.) at present. As per SLP evaluation (8/1/23) Regular solids with thin liquids. Noted skin ecchymosis, +1 generalized edema per nursing flow sheet. No Ht or UBW available at present.

## 2023-08-02 NOTE — PHYSICAL THERAPY INITIAL EVALUATION ADULT - PERTINENT HX OF CURRENT PROBLEM, REHAB EVAL
58 year old Female presenting with acute onset word finding difficulty associated with right sided weakness and numbness since 10 AM this morning. Presented as code stroke, admitted for further workup.

## 2023-08-02 NOTE — DISCHARGE NOTE PROVIDER - NSDCFUADDAPPT_GEN_ALL_CORE_FT
follow up with Neurologist Dr. Gan:  [phone] [address] [place] [address]     Follow up with PCP

## 2023-08-02 NOTE — PHYSICAL THERAPY INITIAL EVALUATION ADULT - NAME OF CLINICIAN
Burow's Graft Text: The defect edges were debeveled with a #15 scalpel blade.  Given the location of the defect, shape of the defect, the proximity to free margins and the presence of a standing cone deformity a Burow's skin graft was deemed most appropriate. The standing cone was removed and this tissue was then trimmed to the shape of the primary defect. The adipose tissue was also removed until only dermis and epidermis were left.  The skin margins of the secondary defect were undermined to an appropriate distance in all directions utilizing iris scissors.  The secondary defect was closed with interrupted buried subcutaneous sutures.  The skin edges were then re-apposed with running  sutures.  The skin graft was then placed in the primary defect and oriented appropriately. JOSE ALEJANDRO Owen

## 2023-08-02 NOTE — DIETITIAN INITIAL EVALUATION ADULT - PERTINENT LABORATORY DATA
08-02    141  |  110<H>  |  8   ----------------------------<  92  3.8   |  23  |  0.93    Ca    8.5      02 Aug 2023 06:55  Phos  3.1     08-02  Mg     2.40     08-02    TPro  7.3  /  Alb  3.7  /  TBili  0.3  /  DBili  x   /  AST  15  /  ALT  14  /  AlkPhos  75  08-01  A1C with Estimated Average Glucose Result: 5.6 % (08-02-23 @ 06:55)  A1C with Estimated Average Glucose Result: 5.6 % (08-01-23 @ 06:26)

## 2023-08-02 NOTE — DIETITIAN INITIAL EVALUATION ADULT - PERTINENT MEDS FT
MEDICATIONS  (STANDING):  cholecalciferol 2000 Unit(s) Oral daily  enoxaparin Injectable 40 milliGRAM(s) SubCutaneous every 12 hours  levETIRAcetam  IVPB 750 milliGRAM(s) IV Intermittent every 12 hours  sodium chloride 0.9%. 1000 milliLiter(s) (35 mL/Hr) IV Continuous <Continuous>    MEDICATIONS  (PRN):

## 2023-08-02 NOTE — DISCHARGE NOTE PROVIDER - NSDCFUADDINST_GEN_ALL_CORE_FT
Lactose intolerant Lactose intolerant  no driving [place] law at least 6 months until cleared, advise patient to not drive, operate heavy machinery, avoid heights, pools, bathtubs, locked doors.

## 2023-08-02 NOTE — DISCHARGE NOTE PROVIDER - HOSPITAL COURSE
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 Presented as code stroke, admitted for further workup. C/c/b seizure like activity in ED.     Problem/Plan - 1:  ·  Problem: Observed seizure-like activity.   ·  Plan: Pt with questionable seizure vs syncopal episode in 6/2022, started on ?topiramate/keppra but reportedly noncompliant. Now presenting with acute word finding difficulty and L sided deficits. Episode of generalized shaking with R gaze deviation and unresponsiveness in ED.   -S/p IV ativan 0.5mg in ED without improvement in seizure   -Neuro consulted per ED, recs appreciated   -Keppra loaded with 2g per Neuro due to concern for status epilepticus  -C/w Keppra 750mg BID   - EEG wnl   - Labs including: CBC, CMP, Mg, P, CK, UA, Urine drug screen, EtOH level, ammonia, keppra/topiramate levels   - Neuro follow up outpt  - pcp f/u outpt     Problem/Plan - 2:  ·  Problem: Word finding difficulty.   ·  Plan: Patient intermittently unable to follow commands and unable to verbalize currently. Initially presented with acute word finding difficulty and L sided deficits since 10AM. Possibly in status epilepticus vs post-ictal vs CVA.   -Code stroke on presentation - CT Stroke without e/o ICH  -Neuro consulted in ED, recommend work up of seizure like activity as above   MRI BRAIN: No acute intracranial hemorrhage or evidence of acute ischemia.  MRA NECK AND HEAD: No large vessel occlusion or major stenosis.     Problem/Plan - 3:  ·  Problem: H/O: CVA (cerebrovascular accident).   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   -Presenting with word finding difficulty and L sided weakness/numbness  - Echocardiogram with ____________   -Plan as above.     Problem/Plan - 4:  ·  Problem: HTN (hypertension).   ·  Plan: Prescribed nifedipine 30mg QD. Unclear if compliant.   - Held due to controlled bp   - Continue to monitor     Problem/Plan - 5:  ·  Problem: Need for prophylactic measure.   ·  Plan: DVT ppx: Lovenox   Diet: feeding as tolerated     Dispo: Home     Patient seen and evaluated, no acute somatic complaints. Reviewed discharge medications with patient; All new medications requiring new prescriptions were sent to the pharmacy of patient's choice. Reviewed need for prescription for previous home medications and new prescriptions sent if requested. Medically cleared/stable for discharge as per Dr. Shane on 8/2/23 with close outpatient follow up within 1-2 weeks of discharge. Patient understands and agrees with plan of care. 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 Presented as code stroke, c/b seizure like activity in ED.  Pt with questionable seizure vs syncopal episode in 6/2022, started on ?topiramate/keppra but reportedly noncompliant. Now presenting with acute word finding difficulty and L sided deficits. Episode of generalized shaking with R gaze deviation and unresponsiveness in ED. No improvement w/ Ativan therefore was loaded with 2g per Neuro due to concern for status epilepticus. Keppra now increased to 1000mg BID and will discharge on this dosing. EEG no acute findings.   Pt also worked up for stroke due to word finding difficulty. Patient intermittently unable to follow commands and unable to verbalize. CT Stroke without e/o ICH. MRI brain negative. MRA neck and head negative. Per Neuro, possible todds paralysis from seizure on R resolved. Concern for functional exam but needs further workup     HTN -> Prescribed nifedipine 30mg QD. Unclear if compliant. Held due to controlled bp however will resume upon discharge due to high BPs.    Dispo: PT rec rehab however pt wants home instead. Home PT referral sent out by CM.    Case discussed with Dr. Shane on 8/3, pt medically stable for discharge home. Pt can return to work on Tuesday with no restriction.

## 2023-08-02 NOTE — DIETITIAN INITIAL EVALUATION ADULT - NS FNS DIET ORDER
Diet, DASH/TLC:   Sodium & Cholesterol Restricted  Lactose Restricted (Milk Sugar Intoler.) (08-01-23 @ 10:59)

## 2023-08-02 NOTE — DISCHARGE NOTE PROVIDER - NSDCCPCAREPLAN_GEN_ALL_CORE_FT
PRINCIPAL DISCHARGE DIAGNOSIS  Diagnosis: Observed seizure-like activity  Assessment and Plan of Treatment: Neurology saw you for seizure work up and started you on medications. EEG was normal and showed no seizures. CT of your head showed no bleed or new stroke.   - Follow up with your pcp and neurology outpatient and keppra/topiramate levels for further care and medication adjustments if necessary   MRI BRAIN: No acute intracranial hemorrhage or evidence of acute ischemia.  MRA NECK AND HEAD: No large vessel occlusion or major stenosis  Echocardiogram:  Per Neurology: advise patient to not drive, operate heavy machinery, avoid heights, pools, bathtubs, locked doors and follow up with neurology.        SECONDARY DISCHARGE DIAGNOSES  Diagnosis: HTN (hypertension)  Assessment and Plan of Treatment: No need to take nifedipine as blood pressure is controlled     PRINCIPAL DISCHARGE DIAGNOSIS  Diagnosis: Observed seizure-like activity  Assessment and Plan of Treatment: Neurology saw you for seizure work up and started you on medications. EEG was normal and showed no seizures. CT of your head showed no bleed or new stroke.   - Follow up with your pcp and neurology outpatient and keppra levels for further care and medication adjustments if necessary. Please take Keppra 1000mg twice a day.  MRI BRAIN: No acute intracranial hemorrhage or evidence of acute ischemia.  MRA NECK AND HEAD: No large vessel occlusion or major stenosis  Echocardiogram:  Per Neurology: advise patient to not drive, operate heavy machinery, avoid heights, pools, bathtubs, locked doors and follow up with neurology.      SECONDARY DISCHARGE DIAGNOSES  Diagnosis: HTN (hypertension)  Assessment and Plan of Treatment: Please continue Nifedipine as blood pressure was running high in the hospital.

## 2023-08-02 NOTE — PHYSICAL THERAPY INITIAL EVALUATION ADULT - ADDITIONAL COMMENTS
Patient lives alone in apartment, Independent prior with ADL. Patient uses elevator and owns a walker but was not using it prior to admission.

## 2023-08-02 NOTE — DISCHARGE NOTE PROVIDER - CARE PROVIDER_API CALL
Jose Alfredo Bee  Internal Medicine  [address], [address]  [place] [address]  Phone: [phone]  Fax: [phone]  Follow Up Time: 2 weeks    Alex Gan  Neurology  [address], [address]  [place] [address]  Phone: [phone]  Fax: [phone]  Follow Up Time:

## 2023-08-02 NOTE — DISCHARGE NOTE PROVIDER - NSDCMRMEDTOKEN_GEN_ALL_CORE_FT
cholecalciferol oral tablet: 2000 unit(s) orally once a day  levETIRAcetam 750 mg oral tablet: 1 tab(s) orally 2 times a day  Qulipta 60 mg oral tablet: 1 tab(s) orally once a day   cholecalciferol oral tablet: 1 tab(s) orally once a day 2000 unit(s) orally once a day  Keppra 1000 mg oral tablet: 1 tab(s) orally 2 times a day  NIFEdipine 30 mg oral tablet, extended release: 1 tab(s) orally once a day  Qulipta 60 mg oral tablet: 1 tab(s) orally once a day

## 2023-08-03 ENCOUNTER — TRANSCRIPTION ENCOUNTER (OUTPATIENT)
Age: 58
End: 2023-08-03

## 2023-08-03 VITALS
TEMPERATURE: 98 F | RESPIRATION RATE: 18 BRPM | SYSTOLIC BLOOD PRESSURE: 144 MMHG | OXYGEN SATURATION: 97 % | DIASTOLIC BLOOD PRESSURE: 78 MMHG | HEART RATE: 75 BPM

## 2023-08-03 LAB
ANION GAP SERPL CALC-SCNC: 7 MMOL/L — SIGNIFICANT CHANGE UP (ref 7–14)
BUN SERPL-MCNC: 13 MG/DL — SIGNIFICANT CHANGE UP (ref 7–23)
CALCIUM SERPL-MCNC: 8.8 MG/DL — SIGNIFICANT CHANGE UP (ref 8.4–10.5)
CHLORIDE SERPL-SCNC: 106 MMOL/L — SIGNIFICANT CHANGE UP (ref 98–107)
CO2 SERPL-SCNC: 25 MMOL/L — SIGNIFICANT CHANGE UP (ref 22–31)
CREAT SERPL-MCNC: 1 MG/DL — SIGNIFICANT CHANGE UP (ref 0.5–1.3)
EGFR: 66 ML/MIN/1.73M2 — SIGNIFICANT CHANGE UP
GLUCOSE SERPL-MCNC: 91 MG/DL — SIGNIFICANT CHANGE UP (ref 70–99)
HCT VFR BLD CALC: 39.9 % — SIGNIFICANT CHANGE UP (ref 34.5–45)
HGB BLD-MCNC: 12.1 G/DL — SIGNIFICANT CHANGE UP (ref 11.5–15.5)
LEVETIRACETAM SERPL-MCNC: 50.6 UG/ML — HIGH (ref 10–40)
MAGNESIUM SERPL-MCNC: 2.4 MG/DL — SIGNIFICANT CHANGE UP (ref 1.6–2.6)
MCHC RBC-ENTMCNC: 25.4 PG — LOW (ref 27–34)
MCHC RBC-ENTMCNC: 30.3 GM/DL — LOW (ref 32–36)
MCV RBC AUTO: 83.6 FL — SIGNIFICANT CHANGE UP (ref 80–100)
NRBC # BLD: 0 /100 WBCS — SIGNIFICANT CHANGE UP (ref 0–0)
NRBC # FLD: 0 K/UL — SIGNIFICANT CHANGE UP (ref 0–0)
PHOSPHATE SERPL-MCNC: 3.8 MG/DL — SIGNIFICANT CHANGE UP (ref 2.5–4.5)
PLATELET # BLD AUTO: 231 K/UL — SIGNIFICANT CHANGE UP (ref 150–400)
POTASSIUM SERPL-MCNC: 3.9 MMOL/L — SIGNIFICANT CHANGE UP (ref 3.5–5.3)
POTASSIUM SERPL-SCNC: 3.9 MMOL/L — SIGNIFICANT CHANGE UP (ref 3.5–5.3)
RBC # BLD: 4.77 M/UL — SIGNIFICANT CHANGE UP (ref 3.8–5.2)
RBC # FLD: 15.1 % — HIGH (ref 10.3–14.5)
SODIUM SERPL-SCNC: 138 MMOL/L — SIGNIFICANT CHANGE UP (ref 135–145)
WBC # BLD: 7.87 K/UL — SIGNIFICANT CHANGE UP (ref 3.8–10.5)
WBC # FLD AUTO: 7.87 K/UL — SIGNIFICANT CHANGE UP (ref 3.8–10.5)

## 2023-08-03 RX ORDER — NIFEDIPINE 30 MG
1 TABLET, EXTENDED RELEASE 24 HR ORAL
Qty: 30 | Refills: 0
Start: 2023-08-03 | End: 2023-09-01

## 2023-08-03 RX ORDER — LEVETIRACETAM 250 MG/1
1 TABLET, FILM COATED ORAL
Qty: 60 | Refills: 0
Start: 2023-08-03 | End: 2023-09-01

## 2023-08-03 RX ORDER — LEVETIRACETAM 250 MG/1
1000 TABLET, FILM COATED ORAL EVERY 12 HOURS
Refills: 0 | Status: DISCONTINUED | OUTPATIENT
Start: 2023-08-03 | End: 2023-08-03

## 2023-08-03 RX ORDER — CHOLECALCIFEROL (VITAMIN D3) 125 MCG
1 CAPSULE ORAL
Qty: 30 | Refills: 0
Start: 2023-08-03 | End: 2023-09-01

## 2023-08-03 RX ADMIN — LEVETIRACETAM 400 MILLIGRAM(S): 250 TABLET, FILM COATED ORAL at 11:15

## 2023-08-03 RX ADMIN — ENOXAPARIN SODIUM 40 MILLIGRAM(S): 100 INJECTION SUBCUTANEOUS at 11:15

## 2023-08-03 RX ADMIN — Medication 2000 UNIT(S): at 11:15

## 2023-08-03 NOTE — DISCHARGE NOTE NURSING/CASE MANAGEMENT/SOCIAL WORK - NSDCFUADDAPPT_GEN_ALL_CORE_FT
follow up with Neurologist Dr. Gan:  [phone] [address] [place] [address]     Follow up with PCP

## 2023-08-03 NOTE — PROGRESS NOTE ADULT - PROBLEM SELECTOR PLAN 5
DVT ppx: Lovenox   Diet: feeding as tolerated   Dispo: Pending clinical improvement
DVT ppx: Lovenox   Diet: feeding as tolerated   Dispo: rehab placement
DVT ppx: Lovenox   Diet: feeding as tolerated   Dispo: plan for home with home PT

## 2023-08-03 NOTE — PROGRESS NOTE ADULT - PROBLEM SELECTOR PLAN 1
Pt with questionable seizure vs syncopal episode in 6/2022, started on ?topiramate/keppra but reportedly noncompliant. Now presenting with acute word finding difficulty and L sided deficits. Episode of generalized shaking with R gaze deviation and unresponsiveness in ED.   -S/p IV ativan 0.5mg in ED without improvement in seizure   -Neuro follow up appreciated   -Keppra loaded with 2g per Neuro due to concern for status epilepticus  -C/w Keppra 750mg BID , follow up with neuro for need of outptient dosing   -EEG no acute findings   -Labs including: CBC, CMP, Mg, P, CK, UA, Urine drug screen, EtOH level, ammonia, keppra/topiramate levels   - Neuro follow up
Pt with questionable seizure vs syncopal episode in 6/2022, started on ?topiramate/keppra but reportedly noncompliant. Now presenting with acute word finding difficulty and L sided deficits. Episode of generalized shaking with R gaze deviation and unresponsiveness in ED.   -S/p IV ativan 0.5mg in ED without improvement in seizure   -Neuro consulted per ED, recs appreciated   -Keppra loaded with 2g per Neuro due to concern for status epilepticus  -C/w Keppra 750mg BID   -EEG in progress   -Labs including: CBC, CMP, Mg, P, CK, UA, Urine drug screen, EtOH level, ammonia, keppra/topiramate levels   - Neuro follow up
Pt with questionable seizure vs syncopal episode in 6/2022, started on ?topiramate/keppra but reportedly noncompliant. Now presenting with acute word finding difficulty and L sided deficits. Episode of generalized shaking with R gaze deviation and unresponsiveness in ED.   -S/p IV ativan 0.5mg in ED without improvement in seizure   -Neuro follow up appreciated   -Keppra loaded with 2g per Neuro due to concern for status epilepticus  -C/w Keppra 750mg BID , follow up with neuro for need of outpatient dosing   -EEG no acute findings   -Labs including: CBC, CMP, Mg, P, CK, UA, Urine drug screen, EtOH level, ammonia, keppra/topiramate levels   - Neuro follow up, increased keppra to 1 BID

## 2023-08-03 NOTE — PROGRESS NOTE ADULT - ASSESSMENT
57 yo R-H F w/  L parasaggital IPH, possible syncope vs seizure episode (on Keppra & Topamax but hx non adherance in past), HTN, presents to [facility] ED as code stroke for word finding difficulty,   weakness/numbness. LKW approximately 10 am on 7/31. Patient history limited, so EMS providing most of initial history. Around time of LKW, patient was on the phone with a friend and experienced acute onset word finding difficulty and involuntary movements (as per EMS, patient was spinning in her chair and unable to stop). Upon ambulation, EMS reports R leg was shaking making it difficult for her to walk. Patient endorses that this feels similar to her seizure-like event in the past. Patient also currently endorsing SOB. Exam as above.   Arrival: NIHSS: 5 ; mRS: 0  Patient is not a thrombolytic candidate because history of IPH and questionable exam findings.   Patient is not a mechanical thrombectomy candidate because no evidence of LVO.  6/2022: MRI brain w/ and w/o: chronic L pareital hemorrhage   7/31 CTH:  negative for acute findings.   8/1 on exam with RHP, poor effort on exam   LDL 71 A1c 5.6   EEG neg   MRI brain neg  MRA H/N neg   8/1 EEG neg     Impression: Word finding difficulty & subjective R sided weakness/shaking/numbness of unclear etiology   h/o L IPH   posible todds paralysis from seizure on R resolved   concern for functional exam but needs further workup     Recommendations      - psych f/u ; concern for functional exam   - c/w keppra 750mg bid (she states she may have missed a dose or 2) ; stopped topamax    - if patient has a convulsion, please document accurately the length of the episode, and specifically what the patient was doing paying attention to eye opening vs closure, gaze deviation, shaking of extremities, tongue bite, urinary incontinence, any derangement of vital signs  - If patient has a generalized tonic clonic episode for >3 minutes or significant derangement of vital signs may administer Ativan 2mg IV  - Neurochecks: q4hr   - BP goals: Normotension, no need for permissive HTN at this time.   - PT/OT evaluation   - NPO until dysphagia screening, otherwise swallow evaluation  - advise patient to not drive, operate heavy machinery, avoid heights, pools, bathtubs, locked doors.   - telemetry  - PT/OT/SS/SLP, OOBC  - check FS, glucose control <180  - GI/DVT ppx     - Upon discharge, patient should follow up with Dr. Gan:  [phone] [address] [place] [address]   - [place] planning.  righ weakness improved. does not need AR. wants to go home   Alex Gan MD  Vascular Neurology  Office: [phone].     57 yo R-H F w/  L parasaggital IPH, possible syncope vs seizure episode (on Keppra & Topamax but hx non adherance in past), HTN, presents to [facility] ED as code stroke for word finding difficulty,   weakness/numbness. LKW approximately 10 am on 7/31. Patient history limited, so EMS providing most of initial history. Around time of LKW, patient was on the phone with a friend and experienced acute onset word finding difficulty and involuntary movements (as per EMS, patient was spinning in her chair and unable to stop). Upon ambulation, EMS reports R leg was shaking making it difficult for her to walk. Patient endorses that this feels similar to her seizure-like event in the past. Patient also currently endorsing SOB. Exam as above.   Arrival: NIHSS: 5 ; mRS: 0  Patient is not a thrombolytic candidate because history of IPH and questionable exam findings.   Patient is not a mechanical thrombectomy candidate because no evidence of LVO.  6/2022: MRI brain w/ and w/o: chronic L pareital hemorrhage   7/31 CTH:  negative for acute findings.   8/1 on exam with RHP, poor effort on exam   LDL 71 A1c 5.6   EEG neg   MRI brain neg  MRA H/N neg   8/1 EEG neg     Impression: Word finding difficulty & subjective R sided weakness/shaking/numbness of unclear etiology   h/o L IPH   posible todds paralysis from seizure on R resolved   concern for functional exam but needs further workup     Recommendations      - psych f/u ; concern for functional exam   -  keppra 750mg bid (she states she may have missed a dose or, increase to 1g BID 2) ; stopped topamax    - if patient has a convulsion, please document accurately the length of the episode, and specifically what the patient was doing paying attention to eye opening vs closure, gaze deviation, shaking of extremities, tongue bite, urinary incontinence, any derangement of vital signs  - If patient has a generalized tonic clonic episode for >3 minutes or significant derangement of vital signs may administer Ativan 2mg IV  - Neurochecks: q4hr   - BP goals: Normotension, no need for permissive HTN at this time.   - PT/OT evaluation   - NPO until dysphagia screening, otherwise swallow evaluation  - advise patient to not drive, operate heavy machinery, avoid heights, pools, bathtubs, locked doors.   - telemetry  - PT/OT/SS/SLP, OOBC  - check FS, glucose control <180  - GI/DVT ppx     - Upon discharge, patient should follow up with Dr. Gan:  [phone] [address] [place] [address]   - [place] planning.  righ weakness improved. does not need AR. wants to go home   Alex Gan MD  Vascular Neurology  Office: [phone].     57 yo R-H F w/  L parasaggital IPH, possible syncope vs seizure episode (on Keppra & Topamax but hx non adherance in past), HTN, presents to [facility] ED as code stroke for word finding difficulty,   weakness/numbness. LKW approximately 10 am on 7/31. Patient history limited, so EMS providing most of initial history. Around time of LKW, patient was on the phone with a friend and experienced acute onset word finding difficulty and involuntary movements (as per EMS, patient was spinning in her chair and unable to stop). Upon ambulation, EMS reports R leg was shaking making it difficult for her to walk. Patient endorses that this feels similar to her seizure-like event in the past. Patient also currently endorsing SOB. Exam as above.   Arrival: NIHSS: 5 ; mRS: 0  Patient is not a thrombolytic candidate because history of IPH and questionable exam findings.   Patient is not a mechanical thrombectomy candidate because no evidence of LVO.  6/2022: MRI brain w/ and w/o: chronic L pareital hemorrhage   7/31 CTH:  negative for acute findings.   8/1 on exam with RHP, poor effort on exam   LDL 71 A1c 5.6   EEG neg   MRI brain neg  MRA H/N neg   8/1 EEG neg     Impression: Word finding difficulty & subjective R sided weakness/shaking/numbness of unclear etiology   h/o L IPH   posible todds paralysis from seizure on R resolved   concern for functional exam but needs further workup     Recommendations      - psych f/u ; concern for functional exam   -  keppra 750mg bid (she states she may have missed a dose or, increase to 1g BID 2) ; stopped topamax    - if patient has a convulsion, please document accurately the length of the episode, and specifically what the patient was doing paying attention to eye opening vs closure, gaze deviation, shaking of extremities, tongue bite, urinary incontinence, any derangement of vital signs  - If patient has a generalized tonic clonic episode for >3 minutes or significant derangement of vital signs may administer Ativan 2mg IV  - Neurochecks: q4hr   - BP goals: Normotension, no need for permissive HTN at this time.   - PT/OT evaluation   - NPO until dysphagia screening, otherwise swallow evaluation  - advise patient to not drive, operate heavy machinery, avoid heights, pools, bathtubs, locked doors.   - telemetry  - check FS, glucose control <180  - GI/DVT ppx     - Upon discharge, patient should follow up with Dr. Gan:  [phone] [address] [place] [address]   - [place] planning.  righ weakness improved. does not need AR. wants to go home   no driving [place] law at least 6 months until clleared   Alex Gan MD  Vascular Neurology  Office: [phone].

## 2023-08-03 NOTE — PROGRESS NOTE ADULT - SUBJECTIVE AND OBJECTIVE BOX
Neurology Progress Note    S: Patient seen and examined.  MRI neg for stroke ; R weakness improved     Medication:    Medications: MEDICATIONS  (STANDING):  cholecalciferol 2000 Unit(s) Oral daily  enoxaparin Injectable 40 milliGRAM(s) SubCutaneous every 12 hours  levETIRAcetam  IVPB 750 milliGRAM(s) IV Intermittent every 12 hours  sodium chloride 0.9%. 1000 milliLiter(s) (35 mL/Hr) IV Continuous <Continuous>    MEDICATIONS  (PRN):       Vitals:  Vital Signs Last 24 Hrs  T(C): 36.7 (03 Aug 2023 07:30), Max: 36.8 (03 Aug 2023 05:49)  T(F): 98 (03 Aug 2023 07:30), Max: 98.2 (03 Aug 2023 05:49)  HR: 60 (03 Aug 2023 07:30) (60 - 109)  BP: 131/60 (03 Aug 2023 07:30) (131/60 - 157/90)  BP(mean): --  RR: 17 (03 Aug 2023 07:30) (16 - 18)  SpO2: 100% (03 Aug 2023 07:30) (95% - 100%)    Parameters below as of 03 Aug 2023 07:30  Patient On (Oxygen Delivery Method): room air              General Exam:   General Appearance: Appropriately dressed and in no acute distress       Head: Normocephalic, atraumatic and no dysmorphic features  Ear, Nose, and Throat: Moist mucous membranes  CVS: S1S2+  Resp: No SOB, no wheeze or rhonchi  Abd: soft NTND  Extremities: No edema, no cyanosis  Skin: No bruises, no rashes     Neurological Exam:    MS: Eyes open, awake, alert, oriented to person, place, situation, time. Follows all 1 step midline commands.    Language: Speech is clear, fluent with good repetition & comprehension.    CNs: PERRL (R = 3mm, L = 3mm). VFF. EOMI no nystagmus. V1-3 intact to LT b/l. No facial asymmetry b/l, full eye closure strength b/l. Hearing grossly normal (rubbing fingers) b/l. Tongue midline, normal movements, no atrophy.     Motor: Normal muscle bulk & tone. functional weakness on R? now improved     Sensation: Reports anesthesia to temperature on the R side (UE/LE), however, jumps in pain to noxious stimuli in same areas.      Cortical: Extinction on DSS (neglect): none    Reflexes:              Biceps(C5)       BR(C6)     Triceps(C7)               Patellar(L4)    Achilles(S1)    Plantar Resp  R	              2	          2	             2		   2		    2		      Withdrawal   L	              2	          2	             2		   2		    2		      Withdrawal    Coordination: No dysmetria to FTN b/l.     Gait: Deferred.       I personally reviewed the below data/images/labs:        LABS:                          12.1   7.87  )-----------( 231      ( 03 Aug 2023 03:15 )             39.9     08-03    138  |  106  |  13  ----------------------------<  91  3.9   |  25  |  1.00    Ca    8.8      03 Aug 2023 03:15  Phos  3.8     08-03  Mg     2.40     08-03          Urinalysis Basic - ( 03 Aug 2023 03:15 )    Color: x / Appearance: x / SG: x / pH: x  Gluc: 91 mg/dL / Ketone: x  / Bili: x / Urobili: x   Blood: x / Protein: x / Nitrite: x   Leuk Esterase: x / RBC: x / WBC x   Sq Epi: x / Non Sq Epi: x / Bacteria: x          INTERPRETATION:  Noncontrast CT of the brain.    CLINICAL INDICATION:  Word finding difficulty    TECHNIQUE : Axial CT scanning of the brain was obtained from the skull   base to the vertex without the administration of intravenous contrast.    COMPARISON: A brain CT dated 6/12/2022    FINDINGS:  No acute hemorrhage, hydrocephalus, midline shift or   extra-axial collections are identified.    The orbits are not remarkable in appearance.    The visualized paranasal sinuses and tympanomastoid cavities are free of   acute disease.    IMPRESSION:    No acute hemorrhage, mass effect or extra-axial collections.    The results of this examination were discussed with Dr. Ross at 12:32   PM on 7/31/2023    --- End of Report ---         < from: MR Head No Cont (08.02.23 @ 09:55) >    ACC: [id number] EXAM:  MR ANGIO NECK IC   ORDERED BY: SUHAIL BUSBY     ACC: [id number] EXAM:  MR ANGIO BRAIN   ORDERED BY: SUHAIL BUSBY     ACC: [id number] EXAM:  MR BRAIN   ORDERED BY: SUHAIL BUSBY     PROCEDURE DATE:  08/02/2023          INTERPRETATION:  .    CLINICAL INFORMATION: Acute aphasia. Left-sided deficits. Seizure-like   activity.    TECHNIQUE: Multiplanar multi sequential MRI examination of the brain was   performed without the administration of IV gadolinium. MRA images through   the neck and [place] were obtained using a combination of 2-D   and 3-D time-of-flight acquisition. Post contrast MR angiography of the   neck was also performed. The data was then reformatted into a volumetric   data set and reviewed as rotational MIP images. 10 cc's of IV Gadavist   was administered without immediate complication. 0 cc's was discarded.    COMPARISON: Most recent prior head CT exam from 7/31/2023. Prior contrast   enhanced brain MRI study from 6/13/2022.    FINDINGS:    MRI Brain: Hemosiderin staining is seen within the left parieto-occipital   sulcus related to chronic blood products. This appears unchanged in   comparison to the prior brain MRI exam.    Otherwise, the brain parenchyma is normal in signal and morphology. There   is no evidence of acute ischemia on the diffusion-weighted images. No   abnormal brain parenchymal or leptomeningeal enhancement is seen.    Ventricular size and configuration is unremarkable. No abnormal extra   axial fluid collections are noted. Flow-voids are noted throughout the   major intracranial vessels, on the T2 weighted images, consistent with   their patency. The sella turcica and posterior fossa are unremarkable.    The paranasal sinuses and mastoid air cells are clear. Calvarialsignal   is within normal limits. The orbits appear unremarkable.    MRA Neck: There is a bovine anatomic configuration to the aortic arch.    The origins of the great vessels appear unremarkable. The bilateral   common carotid arteries and carotid bifurcations appear unremarkable.    The bilateral cervical internal carotid arteries are within normal limits.    The origins of the bilateral vertebral arteries are normal. The bilateral   cervical vertebral arteries are normal in course and caliber.    MRA [place]: The bilateral intracranial internal carotid,   anterior, and middle cerebral arteries appear unremarkable.    The anterior and bilateral posterior communicating arteries are notable.    The bilateral intradural vertebral arteries, vertebrobasilar junction,   basilar artery, and basilar tip appear unremarkable as well as the   bilateral posterior cerebral arteries.    IMPRESSION:    MRI BRAIN: No acute intracranial hemorrhage or evidence of acute ischemia.    MRA NECK AND HEAD: No large vessel occlusion or major stenosis.    --- End of Report ---            ELADIA MARTINEZ MD; Attending Radiologist  This document has been electronically signed. Aug  2 2023  9:47AM    < end of copied text >  
Neurology Progress Note    S: Patient seen and examined.  MRI neg for stroke     Medication:  cholecalciferol 2000 Unit(s) Oral daily  enoxaparin Injectable 40 milliGRAM(s) SubCutaneous every 12 hours  levETIRAcetam  IVPB 750 milliGRAM(s) IV Intermittent every 12 hours  sodium chloride 0.9%. 1000 milliLiter(s) IV Continuous <Continuous>      Vitals:  Vital Signs Last 24 Hrs  T(C): 36.4 (02 Aug 2023 12:41), Max: 36.7 (01 Aug 2023 22:00)  T(F): 97.6 (02 Aug 2023 12:41), Max: 98.1 (01 Aug 2023 22:00)  HR: 64 (02 Aug 2023 12:41) (56 - 66)  BP: 151/83 (02 Aug 2023 12:41) (123/79 - 151/83)  BP(mean): --  RR: 18 (02 Aug 2023 12:41) (16 - 18)  SpO2: 100% (02 Aug 2023 12:41) (98% - 100%)    Parameters below as of 02 Aug 2023 12:41  Patient On (Oxygen Delivery Method): room air        General Exam:   General Appearance: Appropriately dressed and in no acute distress       Head: Normocephalic, atraumatic and no dysmorphic features  Ear, Nose, and Throat: Moist mucous membranes  CVS: S1S2+  Resp: No SOB, no wheeze or rhonchi  Abd: soft NTND  Extremities: No edema, no cyanosis  Skin: No bruises, no rashes     Neurological Exam:    MS: Eyes open, awake, alert, oriented to person, place, situation, time. Follows all 1 step midline commands.    Language: Speech is clear, fluent with good repetition & comprehension.    CNs: PERRL (R = 3mm, L = 3mm). VFF. EOMI no nystagmus. V1-3 intact to LT b/l. No facial asymmetry b/l, full eye closure strength b/l. Hearing grossly normal (rubbing fingers) b/l. Tongue midline, normal movements, no atrophy.     Motor: Normal muscle bulk & tone. functional weakness on R?     Sensation: Reports anesthesia to temperature on the R side (UE/LE), however, jumps in pain to noxious stimuli in same areas.      Cortical: Extinction on DSS (neglect): none    Reflexes:              Biceps(C5)       BR(C6)     Triceps(C7)               Patellar(L4)    Achilles(S1)    Plantar Resp  R	              2	          2	             2		   2		    2		      Withdrawal   L	              2	          2	             2		   2		    2		      Withdrawal    Coordination: No dysmetria to FTN b/l.     Gait: Deferred.       I personally reviewed the below data/images/labs:      LABS:                          11.5   6.22  )-----------( 245      ( 02 Aug 2023 06:55 )             37.4     08-02    141  |  110<H>  |  8   ----------------------------<  92  3.8   |  23  |  0.93    Ca    8.5      02 Aug 2023 06:55  Phos  3.1     08-02  Mg     2.40     08-02    TPro  7.3  /  Alb  3.7  /  TBili  0.3  /  DBili  x   /  AST  15  /  ALT  14  /  AlkPhos  75  08-01    LIVER FUNCTIONS - ( 01 Aug 2023 06:26 )  Alb: 3.7 g/dL / Pro: 7.3 g/dL / ALK PHOS: 75 U/L / ALT: 14 U/L / AST: 15 U/L / GGT: x             Urinalysis Basic - ( 02 Aug 2023 06:55 )    Color: x / Appearance: x / SG: x / pH: x  Gluc: 92 mg/dL / Ketone: x  / Bili: x / Urobili: x   Blood: x / Protein: x / Nitrite: x   Leuk Esterase: x / RBC: x / WBC x   Sq Epi: x / Non Sq Epi: x / Bacteria: x       < from: CT Brain Stroke Protocol (07.31.23 @ 12:18) >    ACC: [id number] EXAM:  CT BRAIN STROKE PROTOCOL   ORDERED BY: JUDE SLAUGHTER     PROCEDURE DATE:  07/31/2023          INTERPRETATION:  Noncontrast CT of the brain.    CLINICAL INDICATION:  Word finding difficulty    TECHNIQUE : Axial CT scanning of the brain was obtained from the skull   base to the vertex without the administration of intravenous contrast.    COMPARISON: A brain CT dated 6/12/2022    FINDINGS:  No acute hemorrhage, hydrocephalus, midline shift or   extra-axial collections are identified.    The orbits are not remarkable in appearance.    The visualized paranasal sinuses and tympanomastoid cavities are free of   acute disease.    IMPRESSION:    No acute hemorrhage, mass effect or extra-axial collections.    The results of this examination were discussed with Dr. Ross at 12:32   PM on 7/31/2023    --- End of Report ---         < from: MR Head No Cont (08.02.23 @ 09:55) >    ACC: [id number] EXAM:  MR ANGIO NECK IC   ORDERED BY: SUHAIL BUSBY     ACC: [id number] EXAM:  MR ANGIO BRAIN   ORDERED BY: SUHAIL BUSBY     ACC: [id number] EXAM:  MR BRAIN   ORDERED BY: SUHAIL BUSBY     PROCEDURE DATE:  08/02/2023          INTERPRETATION:  .    CLINICAL INFORMATION: Acute aphasia. Left-sided deficits. Seizure-like   activity.    TECHNIQUE: Multiplanar multi sequential MRI examination of the brain was   performed without the administration of IV gadolinium. MRA images through   the neck and [place] were obtained using a combination of 2-D   and 3-D time-of-flight acquisition. Post contrast MR angiography of the   neck was also performed. The data was then reformatted into a volumetric   data set and reviewed as rotational MIP images. 10 cc's of IV Gadavist   was administered without immediate complication. 0 cc's was discarded.    COMPARISON: Most recent prior head CT exam from 7/31/2023. Prior contrast   enhanced brain MRI study from 6/13/2022.    FINDINGS:    MRI Brain: Hemosiderin staining is seen within the left parieto-occipital   sulcus related to chronic blood products. This appears unchanged in   comparison to the prior brain MRI exam.    Otherwise, the brain parenchyma is normal in signal and morphology. There   is no evidence of acute ischemia on the diffusion-weighted images. No   abnormal brain parenchymal or leptomeningeal enhancement is seen.    Ventricular size and configuration is unremarkable. No abnormal extra   axial fluid collections are noted. Flow-voids are noted throughout the   major intracranial vessels, on the T2 weighted images, consistent with   their patency. The sella turcica and posterior fossa are unremarkable.    The paranasal sinuses and mastoid air cells are clear. Calvarialsignal   is within normal limits. The orbits appear unremarkable.    MRA Neck: There is a bovine anatomic configuration to the aortic arch.    The origins of the great vessels appear unremarkable. The bilateral   common carotid arteries and carotid bifurcations appear unremarkable.    The bilateral cervical internal carotid arteries are within normal limits.    The origins of the bilateral vertebral arteries are normal. The bilateral   cervical vertebral arteries are normal in course and caliber.    MRA [place]: The bilateral intracranial internal carotid,   anterior, and middle cerebral arteries appear unremarkable.    The anterior and bilateral posterior communicating arteries are notable.    The bilateral intradural vertebral arteries, vertebrobasilar junction,   basilar artery, and basilar tip appear unremarkable as well as the   bilateral posterior cerebral arteries.    IMPRESSION:    MRI BRAIN: No acute intracranial hemorrhage or evidence of acute ischemia.    MRA NECK AND HEAD: No large vessel occlusion or major stenosis.    --- End of Report ---            ELADIA MARTINEZ MD; Attending Radiologist  This document has been electronically signed. Aug  2 2023  9:47AM    < end of copied text >  
Name of Patient : HARRIETT SHANNON  MRN: [id number]  Date of visit: 08-03-23      Subjective: Patient seen and examined. No new events except as noted.   Doing okay   wants to go home [facility] outpatient follow up     REVIEW OF SYSTEMS:    CONSTITUTIONAL: No weakness, fevers or chills  EYES/ENT: No visual changes;  No vertigo or throat pain   NECK: No pain or stiffness  RESPIRATORY: No cough, wheezing, hemoptysis; No shortness of breath  CARDIOVASCULAR: No chest pain or palpitations  GASTROINTESTINAL: No abdominal or epigastric pain. No nausea, vomiting, or hematemesis; No diarrhea or constipation. No melena or hematochezia.  GENITOURINARY: No dysuria, frequency or hematuria  NEUROLOGICAL: No numbness or weakness  SKIN: No itching, burning, rashes, or lesions   All other review of systems is negative unless indicated above.    MEDICATIONS:  MEDICATIONS  (STANDING):  cholecalciferol 2000 Unit(s) Oral daily  enoxaparin Injectable 40 milliGRAM(s) SubCutaneous every 12 hours  levETIRAcetam  IVPB 1000 milliGRAM(s) IV Intermittent every 12 hours  sodium chloride 0.9%. 1000 milliLiter(s) (35 mL/Hr) IV Continuous <Continuous>      PHYSICAL EXAM:  T(C): 36.6 (08-03-23 @ 11:54), Max: 36.8 (08-03-23 @ 05:49)  HR: 75 (08-03-23 @ 11:54) (60 - 109)  BP: 144/78 (08-03-23 @ 11:54) (131/60 - 157/90)  RR: 18 (08-03-23 @ 11:54) (16 - 18)  SpO2: 97% (08-03-23 @ 11:54) (95% - 100%)  Wt(kg): --  I&O's Summary        Appearance: Normal	  HEENT:  PERRLA   Lymphatic: No lymphadenopathy   Cardiovascular: Normal S1 S2, no JVD  Respiratory: normal effort , clear  Gastrointestinal:  Soft, Non-tender  Skin: No rashes,  warm to touch  Psychiatry:  Mood & affect appropriate  Musculuskeletal: No edema    recent labs, Imaging and EKGs personally reviewed                           12.1   7.87  )-----------( 231      ( 03 Aug 2023 03:15 )             39.9               08-03    138  |  106  |  13  ----------------------------<  91  3.9   |  25  |  1.00    Ca    8.8      03 Aug 2023 03:15  Phos  3.8     08-03  Mg     2.40     08-03                         Urinalysis Basic - ( 03 Aug 2023 03:15 )    Color: x / Appearance: x / SG: x / pH: x  Gluc: 91 mg/dL / Ketone: x  / Bili: x / Urobili: x   Blood: x / Protein: x / Nitrite: x   Leuk Esterase: x / RBC: x / WBC x   Sq Epi: x / Non Sq Epi: x / Bacteria: x                    
Name of Patient : HARRIETT SHANNON  MRN: [id number]  Date of visit: 08-02-23 @ 15:22      Subjective: Patient seen and examined. No new events except as noted.   Doing okay  echo today   pT   improved symptoms     REVIEW OF SYSTEMS:    CONSTITUTIONAL: No weakness, fevers or chills  EYES/ENT: No visual changes;  No vertigo or throat pain   NECK: No pain or stiffness  RESPIRATORY: No cough, wheezing, hemoptysis; No shortness of breath  CARDIOVASCULAR: No chest pain or palpitations  GASTROINTESTINAL: No abdominal or epigastric pain. No nausea, vomiting, or hematemesis; No diarrhea or constipation. No melena or hematochezia.  GENITOURINARY: No dysuria, frequency or hematuria  NEUROLOGICAL: No numbness or weakness  SKIN: No itching, burning, rashes, or lesions   All other review of systems is negative unless indicated above.    MEDICATIONS:  MEDICATIONS  (STANDING):  cholecalciferol 2000 Unit(s) Oral daily  enoxaparin Injectable 40 milliGRAM(s) SubCutaneous every 12 hours  levETIRAcetam  IVPB 750 milliGRAM(s) IV Intermittent every 12 hours  sodium chloride 0.9%. 1000 milliLiter(s) (35 mL/Hr) IV Continuous <Continuous>      PHYSICAL EXAM:  T(C): 36.4 (08-02-23 @ 12:41), Max: 36.8 (08-01-23 @ 18:00)  HR: 64 (08-02-23 @ 12:41) (56 - 66)  BP: 151/83 (08-02-23 @ 12:41) (123/79 - 151/83)  RR: 18 (08-02-23 @ 12:41) (16 - 18)  SpO2: 100% (08-02-23 @ 12:41) (98% - 100%)  Wt(kg): --  I&O's Summary    01 Aug 2023 07:01  -  02 Aug 2023 07:00  --------------------------------------------------------  IN: 120 mL / OUT: 700 mL / NET: -580 mL          Appearance: Normal	  HEENT:  PERRLA   Lymphatic: No lymphadenopathy   Cardiovascular: Normal S1 S2, no JVD  Respiratory: normal effort , clear  Gastrointestinal:  Soft, Non-tender  Skin: No rashes,  warm to touch  Psychiatry:  Mood & affect appropriate  Musculuskeletal: No edema    recent labs, Imaging and EKGs personally reviewed     08-01-23 @ 07:01  -  08-02-23 @ 07:00  --------------------------------------------------------  IN: 120 mL / OUT: 700 mL / NET: -580 mL                            11.5   6.22  )-----------( 245      ( 02 Aug 2023 06:55 )             37.4               08-02    141  |  110<H>  |  8   ----------------------------<  92  3.8   |  23  |  0.93    Ca    8.5      02 Aug 2023 06:55  Phos  3.1     08-02  Mg     2.40     08-02    TPro  7.3  /  Alb  3.7  /  TBili  0.3  /  DBili  x   /  AST  15  /  ALT  14  /  AlkPhos  75  08-01           CARDIAC MARKERS ( 31 Jul 2023 16:50 )  x     / x     / 90 U/L / x     / x                  Urinalysis Basic - ( 02 Aug 2023 06:55 )    Color: x / Appearance: x / SG: x / pH: x  Gluc: 92 mg/dL / Ketone: x  / Bili: x / Urobili: x   Blood: x / Protein: x / Nitrite: x   Leuk Esterase: x / RBC: x / WBC x   Sq Epi: x / Non Sq Epi: x / Bacteria: x      < from: MR Angio Head No Cont (08.02.23 @ 09:55) >  IMPRESSION:    MRI BRAIN: No acute intracranial hemorrhage or evidence of acute ischemia.    MRA NECK AND HEAD: No large vessel occlusion or major stenosis.          
Name of Patient : HARRIETT SHANNON  MRN: [id number]  Date of visit: 08-01-23 @ 14:21      Subjective: Patient seen and examined. No new events except as noted.   DOing better  on EEG     REVIEW OF SYSTEMS:    CONSTITUTIONAL: No weakness, fevers or chills  EYES/ENT: No visual changes;  No vertigo or throat pain   NECK: No pain or stiffness  RESPIRATORY: No cough, wheezing, hemoptysis; No shortness of breath  CARDIOVASCULAR: No chest pain or palpitations  GASTROINTESTINAL: No abdominal or epigastric pain. No nausea, vomiting, or hematemesis; No diarrhea or constipation. No melena or hematochezia.  GENITOURINARY: No dysuria, frequency or hematuria  NEUROLOGICAL: No numbness or weakness  SKIN: No itching, burning, rashes, or lesions   All other review of systems is negative unless indicated above.    MEDICATIONS:  MEDICATIONS  (STANDING):  enoxaparin Injectable 40 milliGRAM(s) SubCutaneous every 12 hours  levETIRAcetam  IVPB 750 milliGRAM(s) IV Intermittent every 12 hours  sodium chloride 0.9%. 1000 milliLiter(s) (35 mL/Hr) IV Continuous <Continuous>      PHYSICAL EXAM:  T(C): 37 (08-01-23 @ 14:00), Max: 37 (08-01-23 @ 14:00)  HR: 62 (08-01-23 @ 14:00) (60 - 80)  BP: 127/70 (08-01-23 @ 14:00) (120/71 - 127/70)  RR: 18 (08-01-23 @ 14:00) (16 - 20)  SpO2: 100% (08-01-23 @ 14:00) (98% - 100%)  Wt(kg): --  I&O's Summary        Appearance: Normal	  HEENT:  PERRLA   Lymphatic: No lymphadenopathy   Cardiovascular: Normal S1 S2, no JVD  Respiratory: normal effort , clear  Gastrointestinal:  Soft, Non-tender  Skin: No rashes,  warm to touch  Psychiatry:  Mood & affect appropriate  Musculuskeletal: No edema    recent labs, Imaging and EKGs personally reviewed                           11.7   6.67  )-----------( 237      ( 01 Aug 2023 06:26 )             38.2               08-01    141  |  105  |  8   ----------------------------<  87  3.4<L>   |  24  |  0.90    Ca    8.9      01 Aug 2023 06:26  Phos  2.9     08-01  Mg     2.30     08-01    TPro  7.3  /  Alb  3.7  /  TBili  0.3  /  DBili  x   /  AST  15  /  ALT  14  /  AlkPhos  75  08-01    PT/INR - ( 31 Jul 2023 12:05 )   PT: 9.9 sec;   INR: <0.90 ratio         PTT - ( 31 Jul 2023 12:05 )  PTT:35.6 sec       CARDIAC MARKERS ( 31 Jul 2023 16:50 )  x     / x     / 90 U/L / x     / x                  Urinalysis Basic - ( 01 Aug 2023 06:26 )    Color: x / Appearance: x / SG: x / pH: x  Gluc: 87 mg/dL / Ketone: x  / Bili: x / Urobili: x   Blood: x / Protein: x / Nitrite: x   Leuk Esterase: x / RBC: x / WBC x   Sq Epi: x / Non Sq Epi: x / Bacteria: x

## 2023-08-03 NOTE — DISCHARGE NOTE NURSING/CASE MANAGEMENT/SOCIAL WORK - NSDCPEFALRISK_GEN_ALL_CORE
For information on Fall & Injury Prevention, visit: [email or web address] OR  [email or web address] OR  https://www.cdc.gov/steadi/patient.html

## 2023-08-03 NOTE — PROGRESS NOTE ADULT - PROBLEM SELECTOR PLAN 2
Patient intermittently unable to follow commands and unable to verbalize currently. Initially presented with acute word finding difficulty and L sided deficits since 10AM. Possibly in status epilepticus vs post-ictal vs CVA.   -Code stroke on presentation - CT Stroke without e/o ICH  -Neuro consulted in ED, recommend work up of seizure like activity as above   -MRI brain w/o contrast, noted, negative   -MRA H w/o contrast, negative  -Echo noted, no acute findings
Patient intermittently unable to follow commands and unable to verbalize currently. Initially presented with acute word finding difficulty and L sided deficits since 10AM. Possibly in status epilepticus vs post-ictal vs CVA.   -Code stroke on presentation - CT Stroke without e/o ICH  -Neuro consulted in ED, recommend work up of seizure like activity as above   -MRI brain w/o contrast  -MRA H w/o contrast, MRA N w/ contrast
Patient intermittently unable to follow commands and unable to verbalize currently. Initially presented with acute word finding difficulty and L sided deficits since 10AM. Possibly in status epilepticus vs post-ictal vs CVA.   -Code stroke on presentation - CT Stroke without e/o ICH  -Neuro consulted in ED, recommend work up of seizure like activity as above   -MRI brain w/o contrast, noted, negative   -MRA H w/o contrast, negative  -Echo noted, no acute findings

## 2023-08-03 NOTE — DISCHARGE NOTE NURSING/CASE MANAGEMENT/SOCIAL WORK - PATIENT PORTAL LINK FT
You can access the FollowMyHealth Patient Portal offered by [facility] by registering at the following website: [email or web address]. By joining [employer]’s FollowMyHealth portal, you will also be able to view your health information using other applications (apps) compatible with our system.

## 2023-08-03 NOTE — PROGRESS NOTE ADULT - PROBLEM SELECTOR PLAN 4
Prescribed nifedipine 30mg QD. Unclear if compliant.   -Hold for now pending clarification   -Continue to monitor  - Crad eval PRN

## 2023-08-03 NOTE — PROGRESS NOTE ADULT - PROBLEM SELECTOR PROBLEM 3
H/O: CVA (cerebrovascular accident)

## 2023-08-14 NOTE — PATIENT PROFILE ADULT - TRANSPORTATION
Anthony Frye DO;  [facility]: [facility] OR;  Service: Orthopedics;  Laterality: Left;  Equipment: Scope; [employer] Truspar Meniscus repair system; José Chondral Drill Set; [place] Screw  Set; Broken Screw Set  C-arm: Entire  Vendor: [email or web address]; [place]      ARTHROSCOPIC CHONDROPLASTY OF KNEE JOINT Left 5/12/2021    Procedure: ARTHROSCOPY, KNEE, WITH CHONDROPLASTY;  Surgeon: Anthony Frye DO;  [facility]: [facility] OR;  Service: Orthopedics;  Laterality: Left;    ARTHROSCOPIC REMOVAL OF LOOSE BODY FROM JOINT Left 5/12/2021    Procedure: REMOVAL, LOOSE BODY, JOINT, ARTHROSCOPIC;  Surgeon: Anthony Frye DO;  [facility]: [facility] OR;  Service: Orthopedics;  Laterality: Left;    BILATERAL SALPINGO-OOPHORECTOMY (BSO) N/A 5/26/2023    Procedure: SALPINGO-OOPHORECTOMY, BILATERAL;  Surgeon: Christopher Goddard MD;  [facility];  Service: OB/GYN;  Laterality: N/A;    DEBULKING OF TUMOR N/A 5/26/2023    Procedure: radical tumor debulking;  Surgeon: Christopher Goddard MD;  [facility];  Service: OB/GYN;  Laterality: N/A;    DIAGNOSTIC LAPAROSCOPY N/A 5/26/2023    Procedure: LAPAROSCOPY, DIAGNOSTIC;  Surgeon: Christophre Goddard MD;  [facility];  Service: OB/GYN;  Laterality: N/A;  4 hr case/ possible rectosigmoid & possible ostomy    EXCISION, SMALL INTESTINE  5/26/2023    Procedure: EXCISION, SMALL INTESTINE;  Surgeon: Christopher Goddard MD;  [facility]: [facility] OR 2ND FLR;  Service: OB/GYN;;    HARDWARE REMOVAL Left 11/18/2020    Procedure: REMOVAL, HARDWARE;  Surgeon: Anthony Frye DO;  [facility]: [facility] OR;  Service: Orthopedics;  Laterality: Left;  Equipment: Scope; [employer] Truspar Meniscus repair system; José Chondral Drill Set; Universal Screw   Set; Broken Screw Set  C-arm: Entire  Vendor: [employer]; [place]      ILEOSTOMY  5/26/2023    Procedure: CREATION, ILEOSTOMY;  Surgeon: Christopher Goddard MD;  [facility]: [facility] OR 2ND FLR;  Service: OB/GYN;;    KNEE ARTHROSCOPY W/ MENISCECTOMY Left 5/12/2021    Procedure: ARTHROSCOPY, KNEE, WITH MENISCECTOMY;  Surgeon: Anthony Frye DO;  [facility]: [facility] Anthony Frye DO;  [facility]: [facility] OR;  Service: Orthopedics;  Laterality: Left;    LAPAROTOMY, EXPLORATORY N/A 5/26/2023    Procedure: LAPAROTOMY, EXPLORATORY with right diaphragm stripping;  Surgeon: Christopher Goddard MD;  [facility]: [facility] OR 2ND FLR;  Service: OB/GYN;  Laterality: N/A;    NECK SURGERY      OMENTECTOMY N/A 5/26/2023    Procedure: OMENTECTOMY;  Surgeon: Christopher Goddard MD;  [facility]: [facility] OR 2ND FLR;  Service: OB/GYN;  Laterality: N/A;    RECTOSIGMOIDECTOMY  5/26/2023    Procedure: RECTOSIGMOIDECTOMY;  Surgeon: Christopher Goddard MD;  [facility]: [facility] OR 2ND FLR;  Service: OB/GYN;;    REPAIR OF MENISCUS OF KNEE  11/18/2020    Procedure: REPAIR, MENISCUS, KNEE;  Surgeon: Anthony Frye DO;  [facility]: [facility] OR;  Service: Orthopedics;;    TOTAL ABDOMINAL HYSTERECTOMY N/A 5/26/2023    Procedure: HYSTERECTOMY, TOTAL, ABDOMINAL;  Surgeon: Christopher Goddard MD;  [facility]: [facility] OR 2ND FLR;  Service: OB/GYN;  Laterality: N/A;    TOTAL KNEE ARTHROPLASTY Left 4/3/2023    Procedure: ARTHROPLASTY, KNEE, TOTAL;  Surgeon: Anthony Frye DO;  [facility]: [facility] Christopher Goddard MD              no

## 2024-01-05 NOTE — STROKE CODE NOTE - NIH STROKE SCALE: 5A. MOTOR ARM, LEFT, QM
(1) Drift; limb holds 90 (or 45) degrees, but drifts down before full 10 seconds; does not hit bed or other support
none of the above

## 2024-02-23 NOTE — PATIENT PROFILE ADULT - FUNCTIONAL ASSESSMENT - BASIC MOBILITY 3.
----- Message from Vanessa Douglas MD sent at 2/23/2024  6:20 AM CST -----  Call the patient with result.Ok for surgery   3 = A little assistance

## 2024-12-16 ENCOUNTER — INPATIENT (INPATIENT)
Facility: HOSPITAL | Age: 59
LOS: 2 days | Discharge: HOME | End: 2024-12-19
Attending: INTERNAL MEDICINE | Admitting: INTERNAL MEDICINE
Payer: COMMERCIAL

## 2024-12-16 VITALS
HEIGHT: 61 IN | WEIGHT: 233.03 LBS | HEART RATE: 66 BPM | RESPIRATION RATE: 16 BRPM | DIASTOLIC BLOOD PRESSURE: 74 MMHG | OXYGEN SATURATION: 95 % | SYSTOLIC BLOOD PRESSURE: 137 MMHG | TEMPERATURE: 98 F

## 2024-12-16 DIAGNOSIS — R20.2 PARESTHESIA OF SKIN: ICD-10-CM

## 2024-12-16 LAB
ADD ON TEST-SPECIMEN IN LAB: SIGNIFICANT CHANGE UP
ALBUMIN SERPL ELPH-MCNC: 3.6 G/DL — SIGNIFICANT CHANGE UP (ref 3.3–5)
ALP SERPL-CCNC: 94 U/L — SIGNIFICANT CHANGE UP (ref 40–120)
ALT FLD-CCNC: 21 U/L — SIGNIFICANT CHANGE UP (ref 4–33)
ANION GAP SERPL CALC-SCNC: 10 MMOL/L — SIGNIFICANT CHANGE UP (ref 7–14)
APTT BLD: 42 SEC — HIGH (ref 24.5–35.6)
AST SERPL-CCNC: 36 U/L — HIGH (ref 4–32)
BASOPHILS # BLD AUTO: 0.02 K/UL — SIGNIFICANT CHANGE UP (ref 0–0.2)
BASOPHILS NFR BLD AUTO: 0.3 % — SIGNIFICANT CHANGE UP (ref 0–2)
BILIRUB SERPL-MCNC: <0.2 MG/DL — SIGNIFICANT CHANGE UP (ref 0.2–1.2)
BLOOD GAS VENOUS COMPREHENSIVE RESULT: SIGNIFICANT CHANGE UP
BUN SERPL-MCNC: 12 MG/DL — SIGNIFICANT CHANGE UP (ref 7–23)
CALCIUM SERPL-MCNC: 8.8 MG/DL — SIGNIFICANT CHANGE UP (ref 8.4–10.5)
CHLORIDE SERPL-SCNC: 106 MMOL/L — SIGNIFICANT CHANGE UP (ref 98–107)
CO2 SERPL-SCNC: 21 MMOL/L — LOW (ref 22–31)
CREAT SERPL-MCNC: 0.79 MG/DL — SIGNIFICANT CHANGE UP (ref 0.5–1.3)
EGFR: 87 ML/MIN/1.73M2 — SIGNIFICANT CHANGE UP
EOSINOPHIL # BLD AUTO: 0.07 K/UL — SIGNIFICANT CHANGE UP (ref 0–0.5)
EOSINOPHIL NFR BLD AUTO: 1.1 % — SIGNIFICANT CHANGE UP (ref 0–6)
GLUCOSE SERPL-MCNC: 112 MG/DL — HIGH (ref 70–99)
HCT VFR BLD CALC: 41 % — SIGNIFICANT CHANGE UP (ref 34.5–45)
HGB BLD-MCNC: 12 G/DL — SIGNIFICANT CHANGE UP (ref 11.5–15.5)
IANC: 3.91 K/UL — SIGNIFICANT CHANGE UP (ref 1.8–7.4)
IMM GRANULOCYTES NFR BLD AUTO: 0.3 % — SIGNIFICANT CHANGE UP (ref 0–0.9)
INR BLD: 0.92 RATIO — SIGNIFICANT CHANGE UP (ref 0.85–1.16)
LYMPHOCYTES # BLD AUTO: 2.02 K/UL — SIGNIFICANT CHANGE UP (ref 1–3.3)
LYMPHOCYTES # BLD AUTO: 31.2 % — SIGNIFICANT CHANGE UP (ref 13–44)
MCHC RBC-ENTMCNC: 24.3 PG — LOW (ref 27–34)
MCHC RBC-ENTMCNC: 29.3 G/DL — LOW (ref 32–36)
MCV RBC AUTO: 83.2 FL — SIGNIFICANT CHANGE UP (ref 80–100)
MONOCYTES # BLD AUTO: 0.43 K/UL — SIGNIFICANT CHANGE UP (ref 0–0.9)
MONOCYTES NFR BLD AUTO: 6.6 % — SIGNIFICANT CHANGE UP (ref 2–14)
NEUTROPHILS # BLD AUTO: 3.91 K/UL — SIGNIFICANT CHANGE UP (ref 1.8–7.4)
NEUTROPHILS NFR BLD AUTO: 60.5 % — SIGNIFICANT CHANGE UP (ref 43–77)
NRBC # BLD: 0 /100 WBCS — SIGNIFICANT CHANGE UP (ref 0–0)
NRBC # FLD: 0 K/UL — SIGNIFICANT CHANGE UP (ref 0–0)
PLATELET # BLD AUTO: 232 K/UL — SIGNIFICANT CHANGE UP (ref 150–400)
POTASSIUM SERPL-MCNC: 5 MMOL/L — SIGNIFICANT CHANGE UP (ref 3.5–5.3)
POTASSIUM SERPL-SCNC: 5 MMOL/L — SIGNIFICANT CHANGE UP (ref 3.5–5.3)
PROT SERPL-MCNC: 7.6 G/DL — SIGNIFICANT CHANGE UP (ref 6–8.3)
PROTHROM AB SERPL-ACNC: 11 SEC — SIGNIFICANT CHANGE UP (ref 9.9–13.4)
RBC # BLD: 4.93 M/UL — SIGNIFICANT CHANGE UP (ref 3.8–5.2)
RBC # FLD: 15.8 % — HIGH (ref 10.3–14.5)
SODIUM SERPL-SCNC: 137 MMOL/L — SIGNIFICANT CHANGE UP (ref 135–145)
TROPONIN T, HIGH SENSITIVITY RESULT: <6 NG/L — SIGNIFICANT CHANGE UP
WBC # BLD: 6.47 K/UL — SIGNIFICANT CHANGE UP (ref 3.8–10.5)
WBC # FLD AUTO: 6.47 K/UL — SIGNIFICANT CHANGE UP (ref 3.8–10.5)

## 2024-12-16 PROCEDURE — 0042T: CPT | Mod: MC

## 2024-12-16 PROCEDURE — 99291 CRITICAL CARE FIRST HOUR: CPT

## 2024-12-16 PROCEDURE — 70553 MRI BRAIN STEM W/O & W/DYE: CPT | Mod: 26,MC

## 2024-12-16 PROCEDURE — 70450 CT HEAD/BRAIN W/O DYE: CPT | Mod: 26,MC,59

## 2024-12-16 PROCEDURE — 70496 CT ANGIOGRAPHY HEAD: CPT | Mod: 26,MC

## 2024-12-16 PROCEDURE — 70498 CT ANGIOGRAPHY NECK: CPT | Mod: 26,MC

## 2024-12-16 RX ORDER — SODIUM CHLORIDE 9 MG/ML
1000 INJECTION, SOLUTION INTRAMUSCULAR; INTRAVENOUS; SUBCUTANEOUS
Refills: 0 | Status: DISCONTINUED | OUTPATIENT
Start: 2024-12-16 | End: 2024-12-16

## 2024-12-16 RX ORDER — LEVETIRACETAM 1000 MG/1
750 TABLET ORAL
Refills: 0 | Status: DISCONTINUED | OUTPATIENT
Start: 2024-12-16 | End: 2024-12-19

## 2024-12-16 RX ORDER — ACETAMINOPHEN 500MG 500 MG/1
975 TABLET, COATED ORAL EVERY 6 HOURS
Refills: 0 | Status: DISCONTINUED | OUTPATIENT
Start: 2024-12-16 | End: 2024-12-19

## 2024-12-16 RX ORDER — LEVETIRACETAM 1000 MG/1
1000 TABLET ORAL AT BEDTIME
Refills: 0 | Status: DISCONTINUED | OUTPATIENT
Start: 2024-12-16 | End: 2024-12-19

## 2024-12-16 RX ORDER — METHYLPREDNISOLONE SOD SUCC 125 MG
32 VIAL (EA) INJECTION ONCE
Refills: 0 | Status: COMPLETED | OUTPATIENT
Start: 2024-12-16 | End: 2024-12-16

## 2024-12-16 RX ORDER — SODIUM CHLORIDE 9 MG/ML
1000 INJECTION, SOLUTION INTRAMUSCULAR; INTRAVENOUS; SUBCUTANEOUS ONCE
Refills: 0 | Status: COMPLETED | OUTPATIENT
Start: 2024-12-16 | End: 2024-12-16

## 2024-12-16 RX ORDER — METHYLPREDNISOLONE SOD SUCC 125 MG
32 VIAL (EA) INJECTION ONCE
Refills: 0 | Status: DISCONTINUED | OUTPATIENT
Start: 2024-12-17 | End: 2024-12-17

## 2024-12-16 RX ORDER — DIPHENHYDRAMINE HCL 25 MG
50 CAPSULE ORAL ONCE
Refills: 0 | Status: DISCONTINUED | OUTPATIENT
Start: 2024-12-17 | End: 2024-12-17

## 2024-12-16 RX ADMIN — LEVETIRACETAM 750 MILLIGRAM(S): 1000 TABLET ORAL at 08:27

## 2024-12-16 RX ADMIN — Medication 32 MILLIGRAM(S): at 17:53

## 2024-12-16 RX ADMIN — SODIUM CHLORIDE 1000 MILLILITER(S): 9 INJECTION, SOLUTION INTRAMUSCULAR; INTRAVENOUS; SUBCUTANEOUS at 03:02

## 2024-12-16 RX ADMIN — ACETAMINOPHEN 500MG 975 MILLIGRAM(S): 500 TABLET, COATED ORAL at 11:13

## 2024-12-16 RX ADMIN — LEVETIRACETAM 1000 MILLIGRAM(S): 1000 TABLET ORAL at 22:15

## 2024-12-16 NOTE — ED ADULT NURSE REASSESSMENT NOTE - NS ED NURSE REASSESS COMMENT FT1
Pt received from FLOAT RN on stretcher, A/Ox4 answers all questions appropriately. Pt denies numbness/weakness to all peripheral extremities, no neuro deficits noted on reassessment, refer to flow sheet. Pt denies chest pain and SOB during reassessment, breathing is even and unlabored on room air. Abdomen is soft and non-distended. Pt ambulates independently at baseline, moves all four extremities on command, skin is intact. Pt resting comfortably at the bedside, No apparent acute visible distress noted on reassessment. Vital signs stable, NKA to medications. Pending RN handoff to CDU

## 2024-12-16 NOTE — ED ADULT NURSE NOTE - CODE STROKE DETAILS
Assessment/Plan:  1. Primary osteoarthritis of left knee      No orders of the defined types were placed in this encounter. Patient has severe left knee osteoarthritis. Patient states today he still has relief from previous steroid injection and feels like he does not need one at today's visit. We discussed that he can repeat injections every 3 months as long as they continue to provide relief. He is aware that he is a surgical candidate. May take OTC NSAID as needed. Continue activity as tolerated. Patient may follow-up as needed if symptoms worsen for repeat left knee steroid injection. Return if symptoms worsen or fail to improve, for repeat Left knee CSI. I answered all of the patient's questions during the visit and provided education of the patient's condition during the visit. The patient verbalized understanding of the information given and agrees with the plan. This note was dictated using Open Mile software. It may contain errors including improperly dictated words. Please contact physician directly for any questions. Subjective   Chief Complaint:   Chief Complaint   Patient presents with    Left Knee - Follow-up    Right Knee - Follow-up       [facility]  Charis Weinberg is a 63 y.o. male who presents for follow up for left knee pain. Patient received left knee steroid injection at his last visit on 7/7/23 and reports he is pain free at today's visit and still has symptomatic relief from steroid injection. Patient states he has been able to go to the gym and ride his bike without any complications. He is not taking anything for pain. He will occasionally take tylenol when needed. Patient is not wearing a knee brace or doing PT. Patient is overall pleased with progress with steroid injection from last visit. Patient would like to hold off on TKA until he is closer to shelter. Review of Systems  ROS:    See HPI for musculoskeletal review.    All other systems reviewed are negative History:  Past Medical History:   Diagnosis Date    Allergic     Diarrhea 11/29/2021    Fever 11/29/2021    Hyperkalemia 8/8/2016    Hypertension     Osteoarthritis of left knee 4/10/2020    Situational anxiety 9/27/2018    SOB (shortness of breath) 12/1/2021     Past Surgical History:   Procedure Laterality Date    ANTERIOR CRUCIATE LIGAMENT REPAIR Left     SHOULDER SURGERY Right     R SHOULDER IMPINGEMENT SURGERY     Social History   Social History     Substance and Sexual Activity   Alcohol Use Yes    Comment: occasional     Social History     Substance and Sexual Activity   Drug Use No     Social History     Tobacco Use   Smoking Status Never   Smokeless Tobacco Never     Family History:   Family History   Problem Relation Age of Onset    Hypertension Mother     Hyperlipidemia Mother     Colon cancer Father        Current Outpatient Medications on File Prior to Visit   Medication Sig Dispense Refill    atorvastatin (LIPITOR) 20 mg tablet TAKE 1 TABLET DAILY 90 tablet 3    Multiple Vitamin (MULTI-VITAMIN DAILY PO) Take 1 capsule by mouth      Testosterone 12.5 MG/ACT (1%) GEL 2 pumps daily       No current facility-administered medications on file prior to visit.      Allergies   Allergen Reactions    No Known Allergies         Objective     /82   Pulse 74   Ht 5' 11" (1.803 m)   Wt 98.3 kg (216 lb 11.2 oz)   BMI 30.22 kg/m²      PE:  AAOx 3  WDWN  Hearing intact, no drainage from eyes  no audible wheezing  no abdominal distension  LE compartments soft, skin intact    Ortho Exam:  left Knee:   No erythema  no swelling  no effusion  no warmth  +TTP over medial joint line  AROM: 0- 115  PROM: 0-115 with crepitus   Stable to varus/valgus stress Dr. CHAVA Arriola evaluated pt in triage.  Code stroke called.  Pt not currently symptomatic but earlier had dizziness and right sided body weakness.

## 2024-12-16 NOTE — CONSULT NOTE ADULT - ASSESSMENT
60 y/o F with a history of hemorrhagic CVA, L parietal lobe intra-parenchymal cerebral hemorrhage (2021), seizure d/o, and recently diagnosed with breast cancer who presented for dizziness and right-sided paresthesias. MRI brain showed mild leptomeningeal enhancement involving the cranial nerves which is nonspecific. Oncology consulted due to concern for metastatic disease in setting of new malignancy diagnosis.     - Recent mammo 3/2024 normal, repeat mammo/US 11/2024 revealed a new 4cm mass in the right breast and suspicious axillary LN  - Recent biopsy 12/6/24 reviewed and explained at length to the patient today - shows ER/PA+ HER2- invasive ductal carcinoma, involving multiple cores and a lymph node  - Please obtain a CT chest/abd/pelvis for staging  - Planned for LP in light of nonspecific leptomeningeal enhancement; no solitary brain lesions noted on MRI  - Can check baseline tumor markers including CEA, CA 15-3, CA 27-29  - Explained that if no distant disease is found then she will need to establish care with breast surgery upon discharge (she had been referred to a surgeon in [place] but [place] [facility] to see one of our surgeons that is closer)    Will follow    Irene Obando MD  Hematology-Oncology   [facility]  [phone] (Office)

## 2024-12-16 NOTE — ED PROVIDER NOTE - CRITICAL CARE ATTENDING CONTRIBUTION TO CARE
59-year-old female past medical history of CVA with seizure disorder, recently diagnosed metastatic breast cancer is presenting to the emergency department with acute onset room spinning dizziness and right-sided paresthesias.  This symptom started at 10 PM.  Immediately prior to that the patient was normal.  Of note her CVA was a hemorrhagic stroke.  Patient had cranial nerves that were intact.  5 out of 5 in all extremities ambulation deferred.  Patient had a normal mental status was alert and oriented x 3.  Stroke code was called given onset of symptoms and history of previous CVA.  NIH stroke scale 0 not a thrombolytic candidate.  CTA negative for large vessel occlusion or flow-limiting stenosis, negative for bleed, labs otherwise nonactionable.  Patient was placed in the CDU for MRI.

## 2024-12-16 NOTE — PATIENT PROFILE ADULT - FALL HARM RISK - HARM RISK INTERVENTIONS
Assistance with ambulation/Assistance OOB with selected safe patient handling equipment/Communicate Risk of Fall with Harm to all staff/Monitor gait and stability/Reinforce activity limits and safety measures with patient and family/Sit up slowly, dangle for a short time, stand at bedside before walking/Tailored Fall Risk Interventions/Visual Cue: Yellow wristband and red socks/Bed in lowest position, wheels locked, appropriate side rails in place/Call bell, personal items and telephone in reach/Instruct patient to call for assistance before getting out of bed or chair/Non-slip footwear when patient is out of bed/[place] to call system/Physically safe environment - no spills, clutter or unnecessary equipment/Purposeful Proactive Rounding/Room/bathroom lighting operational, light cord in reach

## 2024-12-16 NOTE — ED ADULT NURSE REASSESSMENT NOTE - NS ED NURSE REASSESS COMMENT FT1
pt a&ox4, patient is resting comfortably, respirations are even and nonlabored on room air. no requests at this time. offering no further medical complaints. comfort and safety maintained. pending MRI results and dispo.

## 2024-12-16 NOTE — CONSULT NOTE ADULT - ATTENDING COMMENTS
59 R-H F with L brain hemorrhage c/b seizures , HTN, new diagnosis of metastatic R breast cancer presented to the ED with transient right sided weakness and numbness.  O/E slight weakness of left facial muscles  CTH CTA no acute finding. ICA beaded appearance  MRI: Mild leptomeningeal enhancement involving cranial nerves in the   posterior fossa, stabe left parietal gliosis     Impression: r/o leptomeningeal disease    continue with Keppra  Consider LP CSF basic studies including Flow cytometry and cytology  MR Cervical, thoracic lumbar spine w/w/o con can be done here or as an outpt  Oncology input

## 2024-12-16 NOTE — ED PROVIDER NOTE - OBJECTIVE STATEMENT
58 yo F pmhc of DM, HTN, HLD, hemorrhagic CVA, and recently diagnosed breast cancer presents for R sided numbness, weakness, and room spinning sensation.  pm, symptom onset 10 pm with symptom abatement at approx 1 am. Denies fever, chills, chest pain, shortness of breath, abd pain, nausea, vomiting, hematuria, dysuria, or any other symptoms at this time. Yoly Hagan DO (PGY-2) 60 yo F pmhc of DM, HTN, HLD, hemorrhagic CVA, seizures, and recently diagnosed breast cancer presents for R sided numbness, weakness, and room spinning sensation.  pm, symptom onset 10 pm with symptom abatement at approx 1 am. Denies fever, chills, chest pain, shortness of breath, abd pain, nausea, vomiting, hematuria, dysuria, or any other symptoms at this time. Yoly Hagan DO (PGY-2) 60 yo F pmhc of hemorrhagic CVA, seizures, and recently diagnosed breast cancer presents for R sided numbness, weakness, and room spinning sensation.  pm, symptom onset 10 pm with symptom abatement at approx 1 am. Denies fever, chills, chest pain, shortness of breath, abd pain, nausea, vomiting, hematuria, dysuria, or any other symptoms at this time. Yoly Hagan,  (PGY-2)

## 2024-12-16 NOTE — H&P ADULT - ASSESSMENT
Patient is a 58 Y/O Female pmhx of hemorrhagic CVA, seizures, and recently diagnosed breast cancer presents for R sided numbness, weakness, and room spinning sensation.  pm, symptom onset 10 pm with symptom abatement at approx 1 am. Denies fever, chills, chest pain, shortness of breath, abd pain, nausea, vomiting, hematuria, dysuria, or any other symptoms at this time.    # R/O CVA   hemiparesis with dizziness, improved and resolved  CVA ruled out  CT noted   MRI noted  R/O leptomeningeal disease   Neuro eval appreciated   plan for LP   fall precautions     # Breast CA   new diagnosis   R?O mets  Pan CT   needs premedications  as per protocol   Oncology eval called     # Hx of Seziures   Neuro follow up   MRI noted   kepra home dose     DVT and GI PPX    discussed ind etail with jazmin regarding plan of care, all questions were answered       Patient seen and examined by me. patient care and plan discussed and reviewed with PA. Plan as outlined above edited by me to reflect our discussion. Advanced care planning/advanced directives discussed with patient/family. DNR status including forceful chest compressions to attempt to restart the heart, ventilator support/artificial breathing, electric shock, artificial nutrition, health care proxy, Molst form all discussed with pt. More than 50% of the visit was spent counseling and/or coordinating care by the attending physician.

## 2024-12-16 NOTE — ED CDU PROVIDER DISPOSITION NOTE - CLINICAL COURSE
60 yo F pmhc of hemorrhagic CVA, seizures, and recently diagnosed breast cancer presents for R sided numbness, weakness, and room spinning sensation.  pm, symptom onset 10 pm with symptom abatement at approx 1 am. Code: CT Head and CTAs acutely unremarkable patient placed in CDU for MRI brain MRI brain showing mild leptomeningeal enhancement involving cranial nerves in the posterior fossa is nonspecific consider CSF analysis and small focus enhancement involving the anterior wall of the right internal auditory canal.  Patient had surgical evaluation for breast mass tomorrow however after assessment of MRI and neurology plan would like to admit for CSF analysis and further oncology evaluation.  Patient can be admitted to Dr. Kilgore

## 2024-12-16 NOTE — ED CDU PROVIDER INITIAL DAY NOTE - ATTENDING APP SHARED VISIT CONTRIBUTION OF CARE
D9-year-old past medical history of hemorrhagic CVA, seizures, any diagnosed breast cancer presents to the emergency department with right-sided numbness weakness and room spinning sensation.  Started around 10 PM was normal immediately before that.  Patient was neurologically intact moving all extremities 5 out of 5, no nystagmus, no cranial nerve deficit.  Given time of onset stroke code was called and CT angiography was negative.  Patient was signed out to oncoming attending pending final neurology recommendations.  Anticipate need for admission versus CDU.  The patient's condition was not amenable to outpatient treatment due to either the lack of feasibility of outpatient care coordination, possibility for further decompensation with adverse outcome if discharge, or treatments and diagnostic  modalities only available during an inpatient hospitalization.

## 2024-12-16 NOTE — H&P ADULT - HISTORY OF PRESENT ILLNESS
Patient is a 60 Y/O Female pmhx of hemorrhagic CVA, seizures, and recently diagnosed breast cancer presents for R sided numbness, weakness, and room spinning sensation.  pm, symptom onset 10 pm with symptom abatement at approx 1 am. Denies fever, chills, chest pain, shortness of breath, abd pain, nausea, vomiting, hematuria, dysuria, or any other symptoms at this time.

## 2024-12-16 NOTE — ED CDU PROVIDER DISPOSITION NOTE - ATTENDING CONTRIBUTION TO CARE
58 yo F pmhc of hemorrhagic CVA, seizures, and recently diagnosed breast cancer presents for R sided numbness, weakness, and room spinning sensation.  pm, symptom onset 10 pm with symptom abatement at approx 1 am. Code: CT Head and CTAs acutely unremarkable patient placed in CDU for MRI brain MRI brain showing mild leptomeningeal enhancement involving cranial nerves in the posterior fossa is nonspecific consider CSF analysis and small focus enhancement involving the anterior wall of the right internal auditory canal.  Patient had surgical evaluation for breast mass tomorrow however after assessment of MRI and neurology plan would like to admit for CSF analysis and further oncology evaluation.  Patient can be admitted to Dr. Kilgore  Agree with the plan. Pt is HDS at this time.

## 2024-12-16 NOTE — ED ADULT NURSE REASSESSMENT NOTE - NS ED NURSE REASSESS COMMENT FT1
pt c/o headache at this time, medicated with PRN medication. neuro/sensory otherwise intact, see [facility]. pt in no acute respiratory distress. pending admission

## 2024-12-16 NOTE — ED ADULT TRIAGE NOTE - CHIEF COMPLAINT QUOTE
Pt arrives to ED via EMS from home c/o weakness and difficulty controlling right side of body  starting around 22:00 on 12/15/24/.  At time pt was having difficulty with spacial awareness as if she was "spinning" which pt states happens when she has a seizure but is unsure if that is what happened.  Pt newly diagnosed with right sided breast cancer.   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  fs =115  Pt not on blood thinners.

## 2024-12-16 NOTE — ED PROVIDER NOTE - OBSERVING MD:
Patient Name (Optional- Will Render 'the Patient' If Blank): Kerrie Ford Mohs Case Number: [id number] Date Of Previous Biopsy (Optional): 09/09/24 Previous Accession (Optional): [id number] Biopsy Photograph Reviewed: Yes Referring Physician (Optional): Saima Cavazos MD Consent Type: Consent 1 (Standard) Eye Shield Used: No Surgeon Performing Repair (Optional): Javier Estrada MD Initial Size Of Lesion: 2.8 X Size Of Lesion In Cm (Optional): 2.3 Number Of Stages: 1 Primary Defect Length In Cm (Final Defect Size - Required For Flaps/Grafts): 3.1 Primary Defect Width In Cm (Final Defect Size - Required For Flaps/Grafts): 2.5 Primary Defect Depth In Cm (Optional But Required For Some Insurers): 0 Repair Type: Complex Repair Which Instrument Did You Use For Dermabrasion?: Wire Brush Which Eyelid Repair Cpt Are You Using?: 58955 Oculoplastic Surgeon Procedure Text (A): After obtaining clear surgical margins the patient was sent to oculoplastics for surgical repair.  The patient understands they will receive post-surgical care and follow-up from the referring physician's office. Otolaryngologist Procedure Text (A): After obtaining clear surgical margins the patient was sent to otolaryngology for surgical repair.  The patient understands they will receive post-surgical care and follow-up from the referring physician's office. Plastic Surgeon Procedure Text (A): After obtaining clear surgical margins the patient was sent to plastics for surgical repair.  The patient understands they will receive post-surgical care and follow-up from the referring physician's office. Mid-Level Procedure Text (A): After obtaining clear surgical margins the patient was sent to a mid-level provider for surgical repair.  The patient understands they will receive post-surgical care and follow-up from the mid-level provider. Provider Procedure Text (A): After obtaining clear surgical margins the defect was repaired by another provider. Asc Procedure Text (A): After obtaining clear surgical margins the patient was sent to an ASC for surgical repair.  The patient understands they will receive post-surgical care and follow-up from the ASC physician. Simple / Intermediate / Complex Repair - Final Wound Length In Cm: 5.5 Suturegard Retention Suture: 2-0 Nylon Retention Suture Bite Size: 3 mm Length To Time In Minutes Device Was In Place: 10 Undermining Type: Entire Wound Debridement Text: The wound edges were debrided prior to proceeding with the closure to facilitate wound healing. Helical Rim Text: The closure involved the helical rim. Vermilion Border Text: The closure involved the vermilion border. Nostril Rim Text: The closure involved the nostril rim. Retention Suture Text: Retention sutures were placed to support the closure and prevent dehiscence. Area H Indication Text: Tumors in this location are included in Area H (eyelids, eyebrows, nose, lips, chin, ear, pre-auricular, post-auricular, temple, genitalia, hands, feet, ankles and areola).  Tissue conservation is critical in these anatomic locations. Area M Indication Text: Tumors in this location are included in Area M (cheek, forehead, scalp, neck, jawline and pretibial skin).  Mohs surgery is indicated for tumors in these anatomic locations. Area L Indication Text: Tumors in this location are included in Area L (trunk and extremities).  Mohs surgery is indicated for larger tumors, or tumors with aggressive histologic features, in these anatomic locations. Depth Of Tumor Invasion (For Histology): tumor not visualized Perineural Invasion (For Histology - Be Specific If Possible): absent Special Stains Stage 1 - Results: Base On Clearance Noted Above Stage 2: Additional Anesthesia Type: 1% lidocaine with epinephrine wbrambl Staging Info: By selecting yes to the question above you will include information on AJCC 8 tumor staging in your Mohs note. Information on tumor staging will be automatically added for SCCs on the head and neck. AJCC 8 includes tumor size, tumor depth, perineural involvement and bone invasion. Tumor Depth: Less than 6mm from granular layer and no invasion beyond the subcutaneous fat Was The Patient On Physician Recommended Anticoagulation Therapy?: Please Select the Appropriate Response Medical Necessity Statement: Based on my medical judgement, Mohs surgery is the most appropriate treatment for this cancer compared to other treatments. Alternatives Discussed Intro (Do Not Add Period): I discussed alternative treatments to Mohs surgery and specifically discussed the risks and benefits of Consent 1/Introductory Paragraph: The rationale for Mohs was explained to the patient and consent was obtained. The risks, benefits and alternatives to therapy were discussed in detail. Specifically, the risks of infection, scarring, bleeding, prolonged wound healing, incomplete removal, allergy to anesthesia, nerve injury and recurrence were addressed. Prior to the procedure, the treatment site was clearly identified and confirmed by the patient. All components of Universal Protocol/PAUSE Rule completed. Consent 2/Introductory Paragraph: Mohs surgery was explained to the patient and consent was obtained. The risks, benefits and alternatives to therapy were discussed in detail. Specifically, the risks of infection, scarring, bleeding, prolonged wound healing, incomplete removal, allergy to anesthesia, nerve injury and recurrence were addressed. Prior to the procedure, the treatment site was clearly identified and confirmed by the patient. All components of Universal Protocol/PAUSE Rule completed. Consent 3/Introductory Paragraph: I gave the patient a chance to ask questions they had about the procedure.  Following this I explained the Mohs procedure and consent was obtained. The risks, benefits and alternatives to therapy were discussed in detail. Specifically, the risks of infection, scarring, bleeding, prolonged wound healing, incomplete removal, allergy to anesthesia, nerve injury and recurrence were addressed. Prior to the procedure, the treatment site was clearly identified and confirmed by the patient. All components of Universal Protocol/PAUSE Rule completed. Consent (Temporal Branch)/Introductory Paragraph: The rationale for Mohs was explained to the patient and consent was obtained. The risks, benefits and alternatives to therapy were discussed in detail. Specifically, the risks of damage to the temporal branch of the facial nerve, infection, scarring, bleeding, prolonged wound healing, incomplete removal, allergy to anesthesia, and recurrence were addressed. Prior to the procedure, the treatment site was clearly identified and confirmed by the patient. All components of Universal Protocol/PAUSE Rule completed. Consent (Marginal Mandibular)/Introductory Paragraph: The rationale for Mohs was explained to the patient and consent was obtained. The risks, benefits and alternatives to therapy were discussed in detail. Specifically, the risks of damage to the marginal mandibular branch of the facial nerve, infection, scarring, bleeding, prolonged wound healing, incomplete removal, allergy to anesthesia, and recurrence were addressed. Prior to the procedure, the treatment site was clearly identified and confirmed by the patient. All components of Universal Protocol/PAUSE Rule completed. Consent (Spinal Accessory)/Introductory Paragraph: The rationale for Mohs was explained to the patient and consent was obtained. The risks, benefits and alternatives to therapy were discussed in detail. Specifically, the risks of damage to the spinal accessory nerve, infection, scarring, bleeding, prolonged wound healing, incomplete removal, allergy to anesthesia, and recurrence were addressed. Prior to the procedure, the treatment site was clearly identified and confirmed by the patient. All components of Universal Protocol/PAUSE Rule completed. Consent (Near Eyelid Margin)/Introductory Paragraph: The rationale for Mohs was explained to the patient and consent was obtained. The risks, benefits and alternatives to therapy were discussed in detail. Specifically, the risks of ectropion or eyelid deformity, infection, scarring, bleeding, prolonged wound healing, incomplete removal, allergy to anesthesia, nerve injury and recurrence were addressed. Prior to the procedure, the treatment site was clearly identified and confirmed by the patient. All components of Universal Protocol/PAUSE Rule completed. Consent (Ear)/Introductory Paragraph: The rationale for Mohs was explained to the patient and consent was obtained. The risks, benefits and alternatives to therapy were discussed in detail. Specifically, the risks of ear deformity, infection, scarring, bleeding, prolonged wound healing, incomplete removal, allergy to anesthesia, nerve injury and recurrence were addressed. Prior to the procedure, the treatment site was clearly identified and confirmed by the patient. All components of Universal Protocol/PAUSE Rule completed. Consent (Nose)/Introductory Paragraph: The rationale for Mohs was explained to the patient and consent was obtained. The risks, benefits and alternatives to therapy were discussed in detail. Specifically, the risks of nasal deformity, changes in the flow of air through the nose, infection, scarring, bleeding, prolonged wound healing, incomplete removal, allergy to anesthesia, nerve injury and recurrence were addressed. Prior to the procedure, the treatment site was clearly identified and confirmed by the patient. All components of Universal Protocol/PAUSE Rule completed. Consent (Lip)/Introductory Paragraph: The rationale for Mohs was explained to the patient and consent was obtained. The risks, benefits and alternatives to therapy were discussed in detail. Specifically, the risks of lip deformity, changes in the oral aperture, infection, scarring, bleeding, prolonged wound healing, incomplete removal, allergy to anesthesia, nerve injury and recurrence were addressed. Prior to the procedure, the treatment site was clearly identified and confirmed by the patient. All components of Universal Protocol/PAUSE Rule completed. Consent (Scalp)/Introductory Paragraph: The rationale for Mohs was explained to the patient and consent was obtained. The risks, benefits and alternatives to therapy were discussed in detail. Specifically, the risks of changes in hair growth pattern secondary to repair, infection, scarring, bleeding, prolonged wound healing, incomplete removal, allergy to anesthesia, nerve injury and recurrence were addressed. Prior to the procedure, the treatment site was clearly identified and confirmed by the patient. All components of Universal Protocol/PAUSE Rule completed. Detail Level: Detailed Postop Diagnosis: same Anesthesia Volume In Cc: 6 Hemostasis: Electrocautery Estimated Blood Loss (Cc): minimal Repair Anesthesia Method: local infiltration Brow Lift Text: A midfrontal incision was made medially to the defect to allow access to the tissues just superior to the left eyebrow. Following careful dissection inferiorly in a supraperiosteal plane to the level of the left eyebrow, several 3-0 monocryl sutures were used to resuspend the eyebrow orbicularis oculi muscular unit to the superior frontal bone periosteum. This resulted in an appropriate reapproximation of static eyebrow symmetry and correction of the left brow ptosis. Deep Sutures: 4-0 Vicryl Additional Deep Sutures: 4-0 Monocryl Epidermal Sutures: 5-0 Ethilon Epidermal Closure: running Suturegard Intro: Intraoperative tissue expansion was performed, utilizing the SUTUREGARD device, in order to reduce wound tension. Suturegard Body: The suture ends were repeatedly re-tightened and re-clamped to achieve the desired tissue expansion. Hemigard Intro: Due to skin fragility and wound tension, it was decided to use HEMIGARD adhesive retention suture devices to permit a linear closure. The skin was cleaned and dried for a 6cm distance away from the wound. Excessive hair, if present, was removed to allow for adhesion. Hemigard Postcare Instructions: The HEMIGARD strips are to remain completely dry for at least 5-7 days. Donor Site Anesthesia Type: same as repair anesthesia Epidermal Closure Graft Donor Site (Optional): simple interrupted Graft Donor Site Bandage (Optional-Leave Blank If You Don't Want In Note): Steri-strips and a pressure bandage were applied to the donor site. Closure 2 Information: This tab is for additional flaps and grafts, including complex repair and grafts and complex repair and flaps. You can also specify a different location for the additional defect, if the location is the same you do not need to select a new one. We will insert the automated text for the repair you select below just as we do for solitary flaps and grafts. Please note that at this time if you select a location with a different insurance zone you will need to override the ICD10 and CPT if appropriate. Closure 3 Information: This tab is for additional flaps and grafts above and beyond our usual structured repairs.  Please note if you enter information here it will not currently bill and you will need to add the billing information manually. Wound Care: Petrolatum Dressing: dry sterile dressing Suture Removal: 14 days Unna Boot Text: An Unna boot was placed to help immobilize the limb and facilitate more rapid healing. Home Suture Removal Text: Patient was provided instructions on removing sutures and will remove their sutures at home.  If they have any questions or difficulties they will call the office. Post-Care Instructions: I reviewed with the patient in detail post-care instructions. Patient is not to engage in any heavy lifting, exercise, or swimming for the next 14 days. Should the patient develop any fevers, chills, bleeding, severe pain patient will contact the office immediately. Pain Refusal Text: I offered to prescribe pain medication but the patient refused to take this medication. Mauc Instructions: By selecting yes to the question below the MAUC number will be added into the note.  This will be calculated automatically based on the diagnosis chosen, the size entered, the body zone selected (H,M,L) and the specific indications you chose. You will also have the option to override the Mohs AUC if you disagree with the automatically calculated number and this option is found in the Case Summary tab. Where Do You Want The Question To Include Opioid Counseling Located?: Case Summary Tab Eye Protection Verbiage: Before proceeding with the stage, a plastic scleral shield was inserted. The globe was anesthetized with a few drops of 1% lidocaine with 1:100,000 epinephrine. Then, an appropriate sized scleral shield was chosen and coated with lacrilube ointment. The shield was gently inserted and left in place for the duration of each stage. After the stage was completed, the shield was gently removed. Mohs Method Verbiage: An incision at a 45 degree angle following the standard Mohs approach was done and the specimen was harvested as a microscopic controlled layer. Surgeon/Pathologist Verbiage (Will Incorporate Name Of Surgeon From Intro If Not Blank): operated in two distinct and integrated capacities as the surgeon and pathologist. Mohs Histo Method Verbiage: Each section was then chromacoded and processed in the Mohs lab using the Mohs protocol and submitted for frozen section. Subsequent Stages Histo Method Verbiage: Using a similar technique to that described above, a thin layer of tissue was removed from all areas where tumor was visible on the previous stage.  The tissue was again oriented, mapped, dyed, and processed as above. Mohs Rapid Report Verbiage: The area of clinically evident tumor was marked with skin marking ink and appropriately hatched.  The initial incision was made following the Mohs approach through the skin.  The specimen was taken to the lab, divided into the necessary number of pieces, chromacoded and processed according to the Mohs protocol.  This was repeated in successive stages until a tumor free defect was achieved. Complex Repair Preamble Text (Leave Blank If You Do Not Want): Extensive wide undermining was performed. Intermediate Repair Preamble Text (Leave Blank If You Do Not Want): Undermining was performed with blunt dissection. Graft Cartilage Fenestration Text: The cartilage was fenestrated with a 2mm punch biopsy to help facilitate graft survival and healing. Non-Graft Cartilage Fenestration Text: The cartilage was fenestrated with a 2mm punch biopsy to help facilitate healing. Secondary Intention Text (Leave Blank If You Do Not Want): The defect will heal with secondary intention. No Repair - Repaired With Adjacent Surgical Defect Text (Leave Blank If You Do Not Want): After obtaining clear surgical margins the defect was repaired concurrently with another surgical defect which was in close approximation. Adjacent Tissue Transfer Text: The defect edges were debeveled with a #15 scalpel blade.  Given the location of the defect and the proximity to free margins an adjacent tissue transfer was deemed most appropriate.  Using a sterile surgical marker, an appropriate flap was drawn incorporating the defect and placing the expected incisions within the relaxed skin tension lines where possible.    The area thus outlined was incised deep to adipose tissue with a #15 scalpel blade.  The skin margins were undermined to an appropriate distance in all directions utilizing iris scissors. Advancement Flap (Single) Text: The defect edges were debeveled with a #15 scalpel blade.  Given the location of the defect and the proximity to free margins a single advancement flap was deemed most appropriate.  Using a sterile surgical marker, an appropriate advancement flap was drawn incorporating the defect and placing the expected incisions within the relaxed skin tension lines where possible.    The area thus outlined was incised deep to adipose tissue with a #15 scalpel blade.  The skin margins were undermined to an appropriate distance in all directions utilizing iris scissors. Advancement Flap (Double) Text: The defect edges were debeveled with a #15 scalpel blade.  Given the location of the defect and the proximity to free margins a double advancement flap was deemed most appropriate.  Using a sterile surgical marker, the appropriate advancement flaps were drawn incorporating the defect and placing the expected incisions within the relaxed skin tension lines where possible.    The area thus outlined was incised deep to adipose tissue with a #15 scalpel blade.  The skin margins were undermined to an appropriate distance in all directions utilizing iris scissors. Advancement-Rotation Flap Text: The defect edges were debeveled with a #15 scalpel blade.  Given the location of the defect, shape of the defect and the proximity to free margins an advancement-rotation flap was deemed most appropriate.  Using a sterile surgical marker, an appropriate flap was drawn incorporating the defect and placing the expected incisions within the relaxed skin tension lines where possible. The area thus outlined was incised deep to adipose tissue with a #15 scalpel blade.  The skin margins were undermined to an appropriate distance in all directions utilizing iris scissors. Alar Island Pedicle Flap Text: The defect edges were debeveled with a #15 scalpel blade.  Given the location of the defect, shape of the defect and the proximity to the alar rim an island pedicle advancement flap was deemed most appropriate.  Using a sterile surgical marker, an appropriate advancement flap was drawn incorporating the defect, outlining the appropriate donor tissue and placing the expected incisions within the nasal ala running parallel to the alar rim. The area thus outlined was incised with a #15 scalpel blade.  The skin margins were undermined minimally to an appropriate distance in all directions around the primary defect and laterally outward around the island pedicle utilizing iris scissors.  There was minimal undermining beneath the pedicle flap. A-T Advancement Flap Text: The defect edges were debeveled with a #15 scalpel blade.  Given the location of the defect, shape of the defect and the proximity to free margins an A-T advancement flap was deemed most appropriate.  Using a sterile surgical marker, an appropriate advancement flap was drawn incorporating the defect and placing the expected incisions within the relaxed skin tension lines where possible.    The area thus outlined was incised deep to adipose tissue with a #15 scalpel blade.  The skin margins were undermined to an appropriate distance in all directions utilizing iris scissors. Banner Transposition Flap Text: The defect edges were debeveled with a #15 scalpel blade.  Given the location of the defect and the proximity to free margins a Banner transposition flap was deemed most appropriate.  Using a sterile surgical marker, an appropriate flap drawn around the defect. The area thus outlined was incised deep to adipose tissue with a #15 scalpel blade.  The skin margins were undermined to an appropriate distance in all directions utilizing iris scissors. Bilateral Helical Rim Advancement Flap Text: The defect edges were debeveled with a #15 blade scalpel.  Given the location of the defect and the proximity to free margins (helical rim) a bilateral helical rim advancement flap was deemed most appropriate.  Using a sterile surgical marker, the appropriate advancement flaps were drawn incorporating the defect and placing the expected incisions between the helical rim and antihelix where possible.  The area thus outlined was incised through and through with a #15 scalpel blade.  With a skin hook and iris scissors, the flaps were gently and sharply undermined and freed up. Bilateral Rotation Flap Text: The defect edges were debeveled with a #15 scalpel blade. Given the location of the defect, shape of the defect and the proximity to free margins a bilateral rotation flap was deemed most appropriate. Using a sterile surgical marker, an appropriate rotation flap was drawn incorporating the defect and placing the expected incisions within the relaxed skin tension lines where possible. The area thus outlined was incised deep to adipose tissue with a #15 scalpel blade. The skin margins were undermined to an appropriate distance in all directions utilizing iris scissors. Following this, the designed flap was carried over into the primary defect and sutured into place. Bilobed Flap Text: The defect edges were debeveled with a #15 scalpel blade.  Given the location of the defect and the proximity to free margins a bilobe flap was deemed most appropriate.  Using a sterile surgical marker, an appropriate bilobe flap drawn around the defect.    The area thus outlined was incised deep to adipose tissue with a #15 scalpel blade.  The skin margins were undermined to an appropriate distance in all directions utilizing iris scissors. Bilobed Transposition Flap Text: The defect edges were debeveled with a #15 scalpel blade.  Given the location of the defect and the proximity to free margins a bilobed transposition flap was deemed most appropriate.  Using a sterile surgical marker, an appropriate bilobe flap drawn around the defect.    The area thus outlined was incised deep to adipose tissue with a #15 scalpel blade.  The skin margins were undermined to an appropriate distance in all directions utilizing iris scissors. Bi-Rhombic Flap Text: The defect edges were debeveled with a #15 scalpel blade.  Given the location of the defect and the proximity to free margins a bi-rhombic flap was deemed most appropriate.  Using a sterile surgical marker, an appropriate rhombic flap was drawn incorporating the defect. The area thus outlined was incised deep to adipose tissue with a #15 scalpel blade.  The skin margins were undermined to an appropriate distance in all directions utilizing iris scissors. Burow's Advancement Flap Text: The defect edges were debeveled with a #15 scalpel blade.  Given the location of the defect and the proximity to free margins a Burow's advancement flap was deemed most appropriate.  Using a sterile surgical marker, the appropriate advancement flap was drawn incorporating the defect and placing the expected incisions within the relaxed skin tension lines where possible.    The area thus outlined was incised deep to adipose tissue with a #15 scalpel blade.  The skin margins were undermined to an appropriate distance in all directions utilizing iris scissors. Chonodrocutaneous Helical Advancement Flap Text: The defect edges were debeveled with a #15 scalpel blade.  Given the location of the defect and the proximity to free margins a chondrocutaneous helical advancement flap was deemed most appropriate.  Using a sterile surgical marker, the appropriate advancement flap was drawn incorporating the defect and placing the expected incisions within the relaxed skin tension lines where possible.    The area thus outlined was incised deep to adipose tissue with a #15 scalpel blade.  The skin margins were undermined to an appropriate distance in all directions utilizing iris scissors. Crescentic Advancement Flap Text: The defect edges were debeveled with a #15 scalpel blade.  Given the location of the defect and the proximity to free margins a crescentic advancement flap was deemed most appropriate.  Using a sterile surgical marker, the appropriate advancement flap was drawn incorporating the defect and placing the expected incisions within the relaxed skin tension lines where possible.    The area thus outlined was incised deep to adipose tissue with a #15 scalpel blade.  The skin margins were undermined to an appropriate distance in all directions utilizing iris scissors. Dorsal Nasal Flap Text: The defect edges were debeveled with a #15 scalpel blade.  Given the location of the defect and the proximity to free margins a dorsal nasal flap was deemed most appropriate.  Using a sterile surgical marker, an appropriate dorsal nasal flap was drawn around the defect.    The area thus outlined was incised deep to adipose tissue with a #15 scalpel blade.  The skin margins were undermined to an appropriate distance in all directions utilizing iris scissors. Double Island Pedicle Flap Text: The defect edges were debeveled with a #15 scalpel blade.  Given the location of the defect, shape of the defect and the proximity to free margins a double island pedicle advancement flap was deemed most appropriate.  Using a sterile surgical marker, an appropriate advancement flap was drawn incorporating the defect, outlining the appropriate donor tissue and placing the expected incisions within the relaxed skin tension lines where possible.    The area thus outlined was incised deep to adipose tissue with a #15 scalpel blade.  The skin margins were undermined to an appropriate distance in all directions around the primary defect and laterally outward around the island pedicle utilizing iris scissors.  There was minimal undermining beneath the pedicle flap. Double O-Z Flap Text: The defect edges were debeveled with a #15 scalpel blade.  Given the location of the defect, shape of the defect and the proximity to free margins a Double O-Z flap was deemed most appropriate.  Using a sterile surgical marker, an appropriate transposition flap was drawn incorporating the defect and placing the expected incisions within the relaxed skin tension lines where possible. The area thus outlined was incised deep to adipose tissue with a #15 scalpel blade.  The skin margins were undermined to an appropriate distance in all directions utilizing iris scissors. Double O-Z Plasty Text: The defect edges were debeveled with a #15 scalpel blade.  Given the location of the defect, shape of the defect and the proximity to free margins a Double O-Z plasty (double transposition flap) was deemed most appropriate.  Using a sterile surgical marker, the appropriate transposition flaps were drawn incorporating the defect and placing the expected incisions within the relaxed skin tension lines where possible. The area thus outlined was incised deep to adipose tissue with a #15 scalpel blade.  The skin margins were undermined to an appropriate distance in all directions utilizing iris scissors.  Hemostasis was achieved with electrocautery.  The flaps were then transposed into place, one clockwise and the other counterclockwise, and anchored with interrupted buried subcutaneous sutures. Double Z Plasty Text: The lesion was extirpated to the level of the fat with a #15 scalpel blade. Given the location of the defect, shape of the defect and the proximity to free margins a double Z-plasty was deemed most appropriate for repair. Using a sterile surgical marker, the appropriate transposition arms of the double Z-plasty were drawn incorporating the defect and placing the expected incisions within the relaxed skin tension lines where possible. The area thus outlined was incised deep to adipose tissue with a #15 scalpel blade. The skin margins were undermined to an appropriate distance in all directions utilizing iris scissors. The opposing transposition arms were then transposed and carried over into place in opposite direction and anchored with interrupted buried subcutaneous sutures. Ear Star Wedge Flap Text: The defect edges were debeveled with a #15 blade scalpel.  Given the location of the defect and the proximity to free margins (helical rim) an ear star wedge flap was deemed most appropriate.  Using a sterile surgical marker, the appropriate flap was drawn incorporating the defect and placing the expected incisions between the helical rim and antihelix where possible.  The area thus outlined was incised through and through with a #15 scalpel blade. Flip-Flop Flap Text: The defect edges were debeveled with a #15 blade scalpel.  Given the location of the defect and the proximity to free margins a flip-flop flap was deemed most appropriate. Using a sterile surgical marker, the appropriate flap was drawn incorporating the defect and placing the expected incisions between the helical rim and antihelix where possible.  The area thus outlined was incised through and through with a #15 scalpel blade. Following this, the designed flap was carried over into the primary defect and sutured into place. Hatchet Flap Text: The defect edges were debeveled with a #15 scalpel blade.  Given the location of the defect, shape of the defect and the proximity to free margins a hatchet flap was deemed most appropriate.  Using a sterile surgical marker, an appropriate hatchet flap was drawn incorporating the defect and placing the expected incisions within the relaxed skin tension lines where possible.    The area thus outlined was incised deep to adipose tissue with a #15 scalpel blade.  The skin margins were undermined to an appropriate distance in all directions utilizing iris scissors. Helical Rim Advancement Flap Text: The defect edges were debeveled with a #15 blade scalpel.  Given the location of the defect and the proximity to free margins (helical rim) a double helical rim advancement flap was deemed most appropriate.  Using a sterile surgical marker, the appropriate advancement flaps were drawn incorporating the defect and placing the expected incisions between the helical rim and antihelix where possible.  The area thus outlined was incised through and through with a #15 scalpel blade.  With a skin hook and iris scissors, the flaps were gently and sharply undermined and freed up. H Plasty Text: Given the location of the defect, shape of the defect and the proximity to free margins a H-plasty was deemed most appropriate for repair.  Using a sterile surgical marker, the appropriate advancement arms of the H-plasty were drawn incorporating the defect and placing the expected incisions within the relaxed skin tension lines where possible. The area thus outlined was incised deep to adipose tissue with a #15 scalpel blade. The skin margins were undermined to an appropriate distance in all directions utilizing iris scissors.  The opposing advancement arms were then advanced into place in opposite direction and anchored with interrupted buried subcutaneous sutures. Island Pedicle Flap Text: The defect edges were debeveled with a #15 scalpel blade.  Given the location of the defect, shape of the defect and the proximity to free margins an island pedicle advancement flap was deemed most appropriate.  Using a sterile surgical marker, an appropriate advancement flap was drawn incorporating the defect, outlining the appropriate donor tissue and placing the expected incisions within the relaxed skin tension lines where possible.    The area thus outlined was incised deep to adipose tissue with a #15 scalpel blade.  The skin margins were undermined to an appropriate distance in all directions around the primary defect and laterally outward around the island pedicle utilizing iris scissors.  There was minimal undermining beneath the pedicle flap. Island Pedicle Flap With Canthal Suspension Text: The defect edges were debeveled with a #15 scalpel blade.  Given the location of the defect, shape of the defect and the proximity to free margins an island pedicle advancement flap was deemed most appropriate.  Using a sterile surgical marker, an appropriate advancement flap was drawn incorporating the defect, outlining the appropriate donor tissue and placing the expected incisions within the relaxed skin tension lines where possible. The area thus outlined was incised deep to adipose tissue with a #15 scalpel blade.  The skin margins were undermined to an appropriate distance in all directions around the primary defect and laterally outward around the island pedicle utilizing iris scissors.  There was minimal undermining beneath the pedicle flap. A suspension suture was placed in the canthal tendon to prevent tension and prevent ectropion. Island Pedicle Flap-Requiring Vessel Identification Text: The defect edges were debeveled with a #15 scalpel blade.  Given the location of the defect, shape of the defect and the proximity to free margins an island pedicle advancement flap was deemed most appropriate.  Using a sterile surgical marker, an appropriate advancement flap was drawn, based on the axial vessel mentioned above, incorporating the defect, outlining the appropriate donor tissue and placing the expected incisions within the relaxed skin tension lines where possible.    The area thus outlined was incised deep to adipose tissue with a #15 scalpel blade.  The skin margins were undermined to an appropriate distance in all directions around the primary defect and laterally outward around the island pedicle utilizing iris scissors.  There was minimal undermining beneath the pedicle flap. Keystone Flap Text: The defect edges were debeveled with a #15 scalpel blade.  Given the location of the defect, shape of the defect a keystone flap was deemed most appropriate.  Using a sterile surgical marker, an appropriate keystone flap was drawn incorporating the defect, outlining the appropriate donor tissue and placing the expected incisions within the relaxed skin tension lines where possible. The area thus outlined was incised deep to adipose tissue with a #15 scalpel blade.  The skin margins were undermined to an appropriate distance in all directions around the primary defect and laterally outward around the flap utilizing iris scissors. Melolabial Transposition Flap Text: The defect edges were debeveled with a #15 scalpel blade.  Given the location of the defect and the proximity to free margins a melolabial flap was deemed most appropriate.  Using a sterile surgical marker, an appropriate melolabial transposition flap was drawn incorporating the defect.    The area thus outlined was incised deep to adipose tissue with a #15 scalpel blade.  The skin margins were undermined to an appropriate distance in all directions utilizing iris scissors. Mercedes Flap Text: The defect edges were debeveled with a #15 scalpel blade.  Given the location of the defect, shape of the defect and the proximity to free margins a Mercedes flap was deemed most appropriate.  Using a sterile surgical marker, an appropriate advancement flap was drawn incorporating the defect and placing the expected incisions within the relaxed skin tension lines where possible. The area thus outlined was incised deep to adipose tissue with a #15 scalpel blade.  The skin margins were undermined to an appropriate distance in all directions utilizing iris scissors. Modified Advancement Flap Text: The defect edges were debeveled with a #15 scalpel blade.  Given the location of the defect, shape of the defect and the proximity to free margins a modified advancement flap was deemed most appropriate.  Using a sterile surgical marker, an appropriate advancement flap was drawn incorporating the defect and placing the expected incisions within the relaxed skin tension lines where possible.    The area thus outlined was incised deep to adipose tissue with a #15 scalpel blade.  The skin margins were undermined to an appropriate distance in all directions utilizing iris scissors. Mucosal Advancement Flap Text: Given the location of the defect, shape of the defect and the proximity to free margins a mucosal advancement flap was deemed most appropriate. Incisions were made with a 15 blade scalpel in the appropriate fashion along the cutaneous vermilion border and the mucosal lip. The remaining actinically damaged mucosal tissue was excised.  The mucosal advancement flap was then elevated to the gingival sulcus with care taken to preserve the neurovascular structures and advanced into the primary defect. Care was taken to ensure that precise realignment of the vermilion border was achieved. Muscle Hinge Flap Text: The defect edges were debeveled with a #15 scalpel blade.  Given the size, depth and location of the defect and the proximity to free margins a muscle hinge flap was deemed most appropriate.  Using a sterile surgical marker, an appropriate hinge flap was drawn incorporating the defect. The area thus outlined was incised with a #15 scalpel blade.  The skin margins were undermined to an appropriate distance in all directions utilizing iris scissors. Mustarde Flap Text: The defect edges were debeveled with a #15 scalpel blade.  Given the size, depth and location of the defect and the proximity to free margins a Mustarde flap was deemed most appropriate.  Using a sterile surgical marker, an appropriate flap was drawn incorporating the defect. The area thus outlined was incised with a #15 scalpel blade.  The skin margins were undermined to an appropriate distance in all directions utilizing iris scissors. Nasal Turnover Hinge Flap Text: The defect edges were debeveled with a #15 scalpel blade.  Given the size, depth, location of the defect and the defect being full thickness a nasal turnover hinge flap was deemed most appropriate.  Using a sterile surgical marker, an appropriate hinge flap was drawn incorporating the defect. The area thus outlined was incised with a #15 scalpel blade. The flap was designed to recreate the nasal mucosal lining and the alar rim. The skin margins were undermined to an appropriate distance in all directions utilizing iris scissors. Nasalis-Muscle-Based Myocutaneous Island Pedicle Flap Text: Using a #15 blade, an incision was made around the donor flap to the level of the nasalis muscle. Wide lateral undermining was then performed in both the subcutaneous plane above the nasalis muscle, and in a submuscular plane just above periosteum. This allowed the formation of a free nasalis muscle axial pedicle (based on the angular artery) which was still attached to the actual cutaneous flap, increasing its mobility and vascular viability. Hemostasis was obtained with pinpoint electrocoagulation. The flap was mobilized into position and the pivotal anchor points positioned and stabilized with buried interrupted sutures. Subcutaneous and dermal tissues were closed in a multilayered fashion with sutures. Tissue redundancies were excised, and the epidermal edges were apposed without significant tension and sutured with sutures. Nasalis Myocutaneous Flap Text: Using a #15 blade, an incision was made around the donor flap to the level of the nasalis muscle. Wide lateral undermining was then performed in both the subcutaneous plane above the nasalis muscle, and in a submuscular plane just above periosteum. This allowed the formation of a free nasalis muscle axial pedicle which was still attached to the actual cutaneous flap, increasing its mobility and vascular viability. Hemostasis was obtained with pinpoint electrocoagulation. The flap was mobilized into position and the pivotal anchor points positioned and stabilized with buried interrupted sutures. Subcutaneous and dermal tissues were closed in a multilayered fashion with sutures. Tissue redundancies were excised, and the epidermal edges were apposed without significant tension and sutured with sutures. Nasolabial Transposition Flap Text: The defect edges were debeveled with a #15 scalpel blade.  Given the size, depth and location of the defect and the proximity to free margins a nasolabial transposition flap was deemed most appropriate. Using a sterile surgical marker, an appropriate flap was drawn incorporating the defect. The area thus outlined was incised with a #15 scalpel blade. The skin margins were undermined to an appropriate distance in all directions utilizing iris scissors. Following this, the designed flap was carried into the primary defect and sutured into place. Orbicularis Oris Muscle Flap Text: The defect edges were debeveled with a #15 scalpel blade.  Given that the defect affected the competency of the oral sphincter an obicularis oris muscle flap was deemed most appropriate to restore this competency and normal muscle function.  Using a sterile surgical marker, an appropriate flap was drawn incorporating the defect. The area thus outlined was incised with a #15 scalpel blade. O-T Advancement Flap Text: The defect edges were debeveled with a #15 scalpel blade.  Given the location of the defect, shape of the defect and the proximity to free margins an O-T advancement flap was deemed most appropriate.  Using a sterile surgical marker, an appropriate advancement flap was drawn incorporating the defect and placing the expected incisions within the relaxed skin tension lines where possible.    The area thus outlined was incised deep to adipose tissue with a #15 scalpel blade.  The skin margins were undermined to an appropriate distance in all directions utilizing iris scissors. O-T Plasty Text: The defect edges were debeveled with a #15 scalpel blade.  Given the location of the defect, shape of the defect and the proximity to free margins an O-T plasty was deemed most appropriate.  Using a sterile surgical marker, an appropriate O-T plasty was drawn incorporating the defect and placing the expected incisions within the relaxed skin tension lines where possible.    The area thus outlined was incised deep to adipose tissue with a #15 scalpel blade.  The skin margins were undermined to an appropriate distance in all directions utilizing iris scissors. O-L Flap Text: The defect edges were debeveled with a #15 scalpel blade.  Given the location of the defect, shape of the defect and the proximity to free margins an O-L flap was deemed most appropriate.  Using a sterile surgical marker, an appropriate advancement flap was drawn incorporating the defect and placing the expected incisions within the relaxed skin tension lines where possible.    The area thus outlined was incised deep to adipose tissue with a #15 scalpel blade.  The skin margins were undermined to an appropriate distance in all directions utilizing iris scissors. O-Z Flap Text: The defect edges were debeveled with a #15 scalpel blade.  Given the location of the defect, shape of the defect and the proximity to free margins an O-Z flap was deemed most appropriate.  Using a sterile surgical marker, an appropriate transposition flap was drawn incorporating the defect and placing the expected incisions within the relaxed skin tension lines where possible. The area thus outlined was incised deep to adipose tissue with a #15 scalpel blade.  The skin margins were undermined to an appropriate distance in all directions utilizing iris scissors. O-Z Plasty Text: The defect edges were debeveled with a #15 scalpel blade.  Given the location of the defect, shape of the defect and the proximity to free margins an O-Z plasty (double transposition flap) was deemed most appropriate.  Using a sterile surgical marker, the appropriate transposition flaps were drawn incorporating the defect and placing the expected incisions within the relaxed skin tension lines where possible.    The area thus outlined was incised deep to adipose tissue with a #15 scalpel blade.  The skin margins were undermined to an appropriate distance in all directions utilizing iris scissors.  Hemostasis was achieved with electrocautery.  The flaps were then transposed into place, one clockwise and the other counterclockwise, and anchored with interrupted buried subcutaneous sutures. Peng Advancement Flap Text: The defect edges were debeveled with a #15 scalpel blade.  Given the location of the defect, shape of the defect and the proximity to free margins a Peng advancement flap was deemed most appropriate.  Using a sterile surgical marker, an appropriate advancement flap was drawn incorporating the defect and placing the expected incisions within the relaxed skin tension lines where possible. The area thus outlined was incised deep to adipose tissue with a #15 scalpel blade.  The skin margins were undermined to an appropriate distance in all directions utilizing iris scissors. Rectangular Flap Text: The defect edges were debeveled with a #15 scalpel blade. Given the location of the defect and the proximity to free margins a rectangular flap was deemed most appropriate. Using a sterile surgical marker, an appropriate rectangular flap was drawn incorporating the defect. The area thus outlined was incised deep to adipose tissue with a #15 scalpel blade. The skin margins were undermined to an appropriate distance in all directions utilizing iris scissors. Following this, the designed flap was carried over into the primary defect and sutured into place. Rhombic Flap Text: The defect edges were debeveled with a #15 scalpel blade.  Given the location of the defect and the proximity to free margins a rhombic flap was deemed most appropriate.  Using a sterile surgical marker, an appropriate rhombic flap was drawn incorporating the defect.    The area thus outlined was incised deep to adipose tissue with a #15 scalpel blade.  The skin margins were undermined to an appropriate distance in all directions utilizing iris scissors. Rhomboid Transposition Flap Text: The defect edges were debeveled with a #15 scalpel blade.  Given the location of the defect and the proximity to free margins a rhomboid transposition flap was deemed most appropriate.  Using a sterile surgical marker, an appropriate rhomboid flap was drawn incorporating the defect.    The area thus outlined was incised deep to adipose tissue with a #15 scalpel blade.  The skin margins were undermined to an appropriate distance in all directions utilizing iris scissors. Rotation Flap Text: The defect edges were debeveled with a #15 scalpel blade.  Given the location of the defect, shape of the defect and the proximity to free margins a rotation flap was deemed most appropriate.  Using a sterile surgical marker, an appropriate rotation flap was drawn incorporating the defect and placing the expected incisions within the relaxed skin tension lines where possible.    The area thus outlined was incised deep to adipose tissue with a #15 scalpel blade.  The skin margins were undermined to an appropriate distance in all directions utilizing iris scissors. Spiral Flap Text: The defect edges were debeveled with a #15 scalpel blade.  Given the location of the defect, shape of the defect and the proximity to free margins a spiral flap was deemed most appropriate.  Using a sterile surgical marker, an appropriate rotation flap was drawn incorporating the defect and placing the expected incisions within the relaxed skin tension lines where possible. The area thus outlined was incised deep to adipose tissue with a #15 scalpel blade.  The skin margins were undermined to an appropriate distance in all directions utilizing iris scissors. Staged Advancement Flap Text: The defect edges were debeveled with a #15 scalpel blade.  Given the location of the defect, shape of the defect and the proximity to free margins a staged advancement flap was deemed most appropriate.  Using a sterile surgical marker, an appropriate advancement flap was drawn incorporating the defect and placing the expected incisions within the relaxed skin tension lines where possible. The area thus outlined was incised deep to adipose tissue with a #15 scalpel blade.  The skin margins were undermined to an appropriate distance in all directions utilizing iris scissors. Star Wedge Flap Text: The defect edges were debeveled with a #15 scalpel blade.  Given the location of the defect, shape of the defect and the proximity to free margins a star wedge flap was deemed most appropriate.  Using a sterile surgical marker, an appropriate rotation flap was drawn incorporating the defect and placing the expected incisions within the relaxed skin tension lines where possible. The area thus outlined was incised deep to adipose tissue with a #15 scalpel blade.  The skin margins were undermined to an appropriate distance in all directions utilizing iris scissors. Transposition Flap Text: The defect edges were debeveled with a #15 scalpel blade.  Given the location of the defect and the proximity to free margins a transposition flap was deemed most appropriate.  Using a sterile surgical marker, an appropriate transposition flap was drawn incorporating the defect.    The area thus outlined was incised deep to adipose tissue with a #15 scalpel blade.  The skin margins were undermined to an appropriate distance in all directions utilizing iris scissors. Trilobed Flap Text: The defect edges were debeveled with a #15 scalpel blade.  Given the location of the defect and the proximity to free margins a trilobed flap was deemed most appropriate.  Using a sterile surgical marker, an appropriate trilobed flap drawn around the defect.    The area thus outlined was incised deep to adipose tissue with a #15 scalpel blade.  The skin margins were undermined to an appropriate distance in all directions utilizing iris scissors. V-Y Flap Text: The defect edges were debeveled with a #15 scalpel blade.  Given the location of the defect, shape of the defect and the proximity to free margins a V-Y flap was deemed most appropriate.  Using a sterile surgical marker, an appropriate advancement flap was drawn incorporating the defect and placing the expected incisions within the relaxed skin tension lines where possible.    The area thus outlined was incised deep to adipose tissue with a #15 scalpel blade.  The skin margins were undermined to an appropriate distance in all directions utilizing iris scissors. V-Y Plasty Text: The defect edges were debeveled with a #15 scalpel blade.  Given the location of the defect, shape of the defect and the proximity to free margins an V-Y advancement flap was deemed most appropriate.  Using a sterile surgical marker, an appropriate advancement flap was drawn incorporating the defect and placing the expected incisions within the relaxed skin tension lines where possible.    The area thus outlined was incised deep to adipose tissue with a #15 scalpel blade.  The skin margins were undermined to an appropriate distance in all directions utilizing iris scissors. W Plasty Text: The lesion was extirpated to the level of the fat with a #15 scalpel blade.  Given the location of the defect, shape of the defect and the proximity to free margins a W-plasty was deemed most appropriate for repair.  Using a sterile surgical marker, the appropriate transposition arms of the W-plasty were drawn incorporating the defect and placing the expected incisions within the relaxed skin tension lines where possible.    The area thus outlined was incised deep to adipose tissue with a #15 scalpel blade.  The skin margins were undermined to an appropriate distance in all directions utilizing iris scissors.  The opposing transposition arms were then transposed into place in opposite direction and anchored with interrupted buried subcutaneous sutures. Z Plasty Text: The lesion was extirpated to the level of the fat with a #15 scalpel blade.  Given the location of the defect, shape of the defect and the proximity to free margins a Z-plasty was deemed most appropriate for repair.  Using a sterile surgical marker, the appropriate transposition arms of the Z-plasty were drawn incorporating the defect and placing the expected incisions within the relaxed skin tension lines where possible.    The area thus outlined was incised deep to adipose tissue with a #15 scalpel blade.  The skin margins were undermined to an appropriate distance in all directions utilizing iris scissors.  The opposing transposition arms were then transposed into place in opposite direction and anchored with interrupted buried subcutaneous sutures. Zygomaticofacial Flap Text: Given the location of the defect, shape of the defect and the proximity to free margins a zygomaticofacial flap was deemed most appropriate for repair.  Using a sterile surgical marker, the appropriate flap was drawn incorporating the defect and placing the expected incisions within the relaxed skin tension lines where possible. The area thus outlined was incised deep to adipose tissue with a #15 scalpel blade with preservation of a vascular pedicle.  The skin margins were undermined to an appropriate distance in all directions utilizing iris scissors.  The flap was then placed into the defect and anchored with interrupted buried subcutaneous sutures. Abbe Flap (Lower To Upper Lip) Text: The defect of the upper lip was assessed and measured.  Given the location and size of the defect, an Abbe flap was deemed most appropriate.  Using a sterile surgical marker, an appropriate Abbe flap was measured and drawn on the lower lip. Local anesthesia was then infiltrated. A scalpel was then used to incise the upper lip through and through the skin, vermilion, muscle and mucosa, leaving the flap pedicled on the opposite side.  The flap was then rotated and transferred to the lower lip defect.  The flap was then sutured into place with a three layer technique, closing the orbicularis oris muscle layer with subcutaneous buried sutures, followed by a mucosal layer and an epidermal layer. Abbe Flap (Upper To Lower Lip) Text: The defect of the lower lip was assessed and measured.  Given the location and size of the defect, an Abbe flap was deemed most appropriate.  Using a sterile surgical marker, an appropriate Abbe flap was measured and drawn on the upper lip. Local anesthesia was then infiltrated.  A scalpel was then used to incise the upper lip through and through the skin, vermilion, muscle and mucosa, leaving the flap pedicled on the opposite side.  The flap was then rotated and transferred to the lower lip defect.  The flap was then sutured into place with a three layer technique, closing the orbicularis oris muscle layer with subcutaneous buried sutures, followed by a mucosal layer and an epidermal layer. Cheek Interpolation Flap Text: A decision was made to reconstruct the defect utilizing an interpolation axial flap and a staged reconstruction.  A telfa template was made of the defect.  This telfa template was then used to outline the Cheek Interpolation flap.  The donor area for the pedicle flap was then injected with anesthesia.  The flap was excised through the skin and subcutaneous tissue down to the layer of the underlying musculature.  The interpolation flap was carefully excised within this deep plane to maintain its blood supply.  The edges of the donor site were undermined.   The donor site was closed in a primary fashion.  The pedicle was then rotated into position and sutured.  Once the tube was sutured into place, adequate blood supply was confirmed with blanching and refill.  The pedicle was then wrapped with xeroform gauze and dressed appropriately with a telfa and gauze bandage to ensure continued blood supply and protect the attached pedicle. Cheek-To-Nose Interpolation Flap Text: A decision was made to reconstruct the defect utilizing an interpolation axial flap and a staged reconstruction.  A telfa template was made of the defect.  This telfa template was then used to outline the Cheek-To-Nose Interpolation flap.  The donor area for the pedicle flap was then injected with anesthesia.  The flap was excised through the skin and subcutaneous tissue down to the layer of the underlying musculature.  The interpolation flap was carefully excised within this deep plane to maintain its blood supply.  The edges of the donor site were undermined.   The donor site was closed in a primary fashion.  The pedicle was then rotated into position and sutured.  Once the tube was sutured into place, adequate blood supply was confirmed with blanching and refill.  The pedicle was then wrapped with xeroform gauze and dressed appropriately with a telfa and gauze bandage to ensure continued blood supply and protect the attached pedicle. Estlander Flap (Lower To Upper Lip) Text: The defect of the lower lip was assessed and measured.  Given the location and size of the defect, an Estlander flap was deemed most appropriate.  Using a sterile surgical marker, an appropriate Estlander flap was measured and drawn on the upper lip. Local anesthesia was then infiltrated. A scalpel was then used to incise the lateral aspect of the flap, through skin, muscle and mucosa, leaving the flap pedicled medially.  The flap was then rotated and positioned to fill the lower lip defect.  The flap was then sutured into place with a three layer technique, closing the orbicularis oris muscle layer with subcutaneous buried sutures, followed by a mucosal layer and an epidermal layer. Interpolation Flap Text: A decision was made to reconstruct the defect utilizing an interpolation axial flap and a staged reconstruction.  A telfa template was made of the defect.  This telfa template was then used to outline the interpolation flap.  The donor area for the pedicle flap was then injected with anesthesia.  The flap was excised through the skin and subcutaneous tissue down to the layer of the underlying musculature.  The interpolation flap was carefully excised within this deep plane to maintain its blood supply.  The edges of the donor site were undermined.   The donor site was closed in a primary fashion.  The pedicle was then rotated into position and sutured.  Once the tube was sutured into place, adequate blood supply was confirmed with blanching and refill.  The pedicle was then wrapped with xeroform gauze and dressed appropriately with a telfa and gauze bandage to ensure continued blood supply and protect the attached pedicle. Melolabial Interpolation Flap Text: A decision was made to reconstruct the defect utilizing an interpolation axial flap and a staged reconstruction.  A telfa template was made of the defect.  This telfa template was then used to outline the melolabial interpolation flap.  The donor area for the pedicle flap was then injected with anesthesia.  The flap was excised through the skin and subcutaneous tissue down to the layer of the underlying musculature.  The pedicle flap was carefully excised within this deep plane to maintain its blood supply.  The edges of the donor site were undermined.   The donor site was closed in a primary fashion.  The pedicle was then rotated into position and sutured.  Once the tube was sutured into place, adequate blood supply was confirmed with blanching and refill.  The pedicle was then wrapped with xeroform gauze and dressed appropriately with a telfa and gauze bandage to ensure continued blood supply and protect the attached pedicle. Mastoid Interpolation Flap Text: A decision was made to reconstruct the defect utilizing an interpolation axial flap and a staged reconstruction.  A telfa template was made of the defect.  This telfa template was then used to outline the mastoid interpolation flap.  The donor area for the pedicle flap was then injected with anesthesia.  The flap was excised through the skin and subcutaneous tissue down to the layer of the underlying musculature.  The pedicle flap was carefully excised within this deep plane to maintain its blood supply.  The edges of the donor site were undermined.   The donor site was closed in a primary fashion.  The pedicle was then rotated into position and sutured.  Once the tube was sutured into place, adequate blood supply was confirmed with blanching and refill.  The pedicle was then wrapped with xeroform gauze and dressed appropriately with a telfa and gauze bandage to ensure continued blood supply and protect the attached pedicle. Paramedian Forehead Flap Text: A decision was made to reconstruct the defect utilizing an interpolation axial flap and a staged reconstruction.  A telfa template was made of the defect.  This telfa template was then used to outline the paramedian forehead pedicle flap.  The donor area for the pedicle flap was then injected with anesthesia.  The flap was excised through the skin and subcutaneous tissue down to the layer of the underlying musculature.  The pedicle flap was carefully excised within this deep plane to maintain its blood supply.  The edges of the donor site were undermined.   The donor site was closed in a primary fashion.  The pedicle was then rotated into position and sutured.  Once the tube was sutured into place, adequate blood supply was confirmed with blanching and refill.  The pedicle was then wrapped with xeroform gauze and dressed appropriately with a telfa and gauze bandage to ensure continued blood supply and protect the attached pedicle. Posterior Auricular Interpolation Flap Text: A decision was made to reconstruct the defect utilizing an interpolation axial flap and a staged reconstruction.  A telfa template was made of the defect.  This telfa template was then used to outline the posterior auricular interpolation flap.  The donor area for the pedicle flap was then injected with anesthesia.  The flap was excised through the skin and subcutaneous tissue down to the layer of the underlying musculature.  The pedicle flap was carefully excised within this deep plane to maintain its blood supply.  The edges of the donor site were undermined.   The donor site was closed in a primary fashion.  The pedicle was then rotated into position and sutured.  Once the tube was sutured into place, adequate blood supply was confirmed with blanching and refill.  The pedicle was then wrapped with xeroform gauze and dressed appropriately with a telfa and gauze bandage to ensure continued blood supply and protect the attached pedicle. Cheiloplasty (Complex) Text: A decision was made to reconstruct the defect with a  cheiloplasty.  The defect was undermined extensively.  Additional obicularis oris muscle was excised with a 15 blade scalpel.  The defect was converted into a full thickness wedge to facilite a better cosmetic result.  Small vessels were then tied off with 5-0 monocyrl. The obicularis oris, superficial fascia, adipose and dermis were then reapproximated.  After the deeper layers were approximated the epidermis was reapproximated with particular care given to realign the vermilion border. Cheiloplasty (Less Than 50%) Text: A decision was made to reconstruct the defect with a  cheiloplasty.  The defect was undermined extensively.  Additional obicularis oris muscle was excised with a 15 blade scalpel.  The defect was converted into a full thickness wedge, of less than 50% of the vertical height of the lip, to facilite a better cosmetic result.  Small vessels were then tied off with 5-0 monocyrl. The obicularis oris, superficial fascia, adipose and dermis were then reapproximated.  After the deeper layers were approximated the epidermis was reapproximated with particular care given to realign the vermilion border. Ear Wedge Repair Text: A wedge excision was completed by carrying down an excision through the full thickness of the ear and cartilage with an inward facing Burow's triangle. The wound was then closed in a layered fashion. Full Thickness Lip Wedge Repair (Flap) Text: Given the location of the defect and the proximity to free margins a full thickness wedge repair was deemed most appropriate.  Using a sterile surgical marker, the appropriate repair was drawn incorporating the defect and placing the expected incisions perpendicular to the vermilion border.  The vermilion border was also meticulously outlined to ensure appropriate reapproximation during the repair.  The area thus outlined was incised through and through with a #15 scalpel blade.  The muscularis and dermis were reaproximated with deep sutures following hemostasis. Care was taken to realign the vermilion border before proceeding with the superficial closure.  Once the vermilion was realigned the superfical and mucosal closure was finished. Burow's Graft Text: The defect edges were debeveled with a #15 scalpel blade.  Given the location of the defect, shape of the defect, the proximity to free margins and the presence of a standing cone deformity a Burow's skin graft was deemed most appropriate. The standing cone was removed and this tissue was then trimmed to the shape of the primary defect. The adipose tissue was also removed until only dermis and epidermis were left.  The skin margins of the secondary defect were undermined to an appropriate distance in all directions utilizing iris scissors.  The secondary defect was closed with interrupted buried subcutaneous sutures.  The skin edges were then re-apposed with running  sutures.  The skin graft was then placed in the primary defect and oriented appropriately. Cartilage Graft Text: The defect edges were debeveled with a #15 scalpel blade.  Given the location of the defect, shape of the defect, the fact the defect involved a full thickness cartilage defect a cartilage graft was deemed most appropriate.  An appropriate donor site was identified, cleansed, and anesthetized. The cartilage graft was then harvested and transferred to the recipient site, oriented appropriately and then sutured into place.  The secondary defect was then repaired using a primary closure. Composite Graft Text: The defect edges were debeveled with a #15 scalpel blade.  Given the location of the defect, shape of the defect, the proximity to free margins and the fact the defect was full thickness a composite graft was deemed most appropriate.  The defect was outline and then transferred to the donor site.  A full thickness graft was then excised from the donor site. The graft was then placed in the primary defect, oriented appropriately and then sutured into place.  The secondary defect was then repaired using a primary closure. Epidermal Autograft Text: The defect edges were debeveled with a #15 scalpel blade.  Given the location of the defect, shape of the defect and the proximity to free margins an epidermal autograft was deemed most appropriate.  Using a sterile surgical marker, the primary defect shape was transferred to the donor site. The epidermal graft was then harvested.  The skin graft was then placed in the primary defect and oriented appropriately. Dermal Autograft Text: The defect edges were debeveled with a #15 scalpel blade.  Given the location of the defect, shape of the defect and the proximity to free margins a dermal autograft was deemed most appropriate.  Using a sterile surgical marker, the primary defect shape was transferred to the donor site. The area thus outlined was incised deep to adipose tissue with a #15 scalpel blade.  The harvested graft was then trimmed of adipose and epidermal tissue until only dermis was left.  The skin graft was then placed in the primary defect and oriented appropriately. Ftsg Text: The defect edges were debeveled with a #15 scalpel blade.  Given the location of the defect, shape of the defect and the proximity to free margins a full thickness skin graft was deemed most appropriate.  Using a sterile surgical marker, the primary defect shape was transferred to the donor site. The area thus outlined was incised deep to adipose tissue with a #15 scalpel blade.  The harvested graft was then trimmed of adipose tissue until only dermis and epidermis was left.  The skin margins of the secondary defect were undermined to an appropriate distance in all directions utilizing iris scissors.  The secondary defect was closed with interrupted buried subcutaneous sutures.  The skin edges were then re-apposed with running  sutures.  The skin graft was then placed in the primary defect and oriented appropriately. Pinch Graft Text: The defect edges were debeveled with a #15 scalpel blade. Given the location of the defect, shape of the defect and the proximity to free margins a pinch graft was deemed most appropriate. Using a sterile surgical marker, the primary defect shape was transferred to the donor site. The area thus outlined was incised deep to adipose tissue with a #15 scalpel blade.  The harvested graft was then trimmed of adipose tissue until only dermis and epidermis was left. The skin margins of the secondary defect were undermined to an appropriate distance in all directions utilizing iris scissors.  The secondary defect was closed with interrupted buried subcutaneous sutures.  The skin edges were then re-apposed with running  sutures.  The skin graft was then placed in the primary defect and oriented appropriately. Skin Substitute Text: The defect edges were debeveled with a #15 scalpel blade.  Given the location of the defect, shape of the defect and the proximity to free margins a skin substitute graft was deemed most appropriate.  The graft material was trimmed to fit the size of the defect. The graft was then placed in the primary defect and oriented appropriately. Split-Thickness Skin Graft Text: The defect edges were debeveled with a #15 scalpel blade.  Given the location of the defect, shape of the defect and the proximity to free margins a split thickness skin graft was deemed most appropriate.  Using a sterile surgical marker, the primary defect shape was transferred to the donor site. The split thickness graft was then harvested.  The skin graft was then placed in the primary defect and oriented appropriately. Tissue Cultured Epidermal Autograft Text: The defect edges were debeveled with a #15 scalpel blade.  Given the location of the defect, shape of the defect and the proximity to free margins a tissue cultured epidermal autograft was deemed most appropriate.  The graft was then trimmed to fit the size of the defect.  The graft was then placed in the primary defect and oriented appropriately. Xenograft Text: The defect edges were debeveled with a #15 scalpel blade.  Given the location of the defect, shape of the defect and the proximity to free margins a xenograft was deemed most appropriate.  The graft was then trimmed to fit the size of the defect.  The graft was then placed in the primary defect and oriented appropriately. Complex Repair And Flap Additional Text (Will Appearing After The Standard Complex Repair Text): The complex repair was not sufficient to completely close the primary defect. The remaining additional defect was repaired with the flap mentioned below. Complex Repair And Graft Additional Text (Will Appearing After The Standard Complex Repair Text): The complex repair was not sufficient to completely close the primary defect. The remaining additional defect was repaired with the graft mentioned below. Eyelid Full Thickness Repair - [id number]: The eyelid defect was full thickness which required a wedge repair of the eyelid. Special care was taken to ensure that the eyelid margin was realligned when placing sutures. Eyelid Partial Thickness Repair - 42017: The eyelid defect was partial thickness which required a wedge repair of the eyelid. Special care was taken to ensure that the eyelid margin was realligned when placing sutures. Intermediate Repair And Flap Additional Text (Will Appearing After The Standard Complex Repair Text): The intermediate repair was not sufficient to completely close the primary defect. The remaining additional defect was repaired with the flap mentioned below. Intermediate Repair And Graft Additional Text (Will Appearing After The Standard Complex Repair Text): The intermediate repair was not sufficient to completely close the primary defect. The remaining additional defect was repaired with the graft mentioned below. Localized Dermabrasion With 15 Blade Text: The patient was draped in routine manner.  Localized dermabrasion using a 15 blade was performed in routine manner to papillary dermis. This spot dermabrasion is being performed to complete skin cancer reconstruction. It also will eliminate the other sun damaged precancerous cells that are known to be part of the regional effect of a lifetime's worth of sun exposure. This localized dermabrasion is therapeutic and should not be considered cosmetic in any regard. Localized Dermabrasion With Sand Papertext: The patient was draped in routine manner.  Localized dermabrasion using sterile sand paper was performed in routine manner to papillary dermis. This spot dermabrasion is being performed to complete skin cancer reconstruction. It also will eliminate the other sun damaged precancerous cells that are known to be part of the regional effect of a lifetime's worth of sun exposure. This localized dermabrasion is therapeutic and should not be considered cosmetic in any regard. Localized Dermabrasion With Wire Brush Text: The patient was draped in routine manner.  Localized dermabrasion using 3 x 17 mm wire brush was performed in routine manner to papillary dermis. This spot dermabrasion is being performed to complete skin cancer reconstruction. It also will eliminate the other sun damaged precancerous cells that are known to be part of the regional effect of a lifetime's worth of sun exposure. This localized dermabrasion is therapeutic and should not be considered cosmetic in any regard. Purse String (Simple) Text: Given the location of the defect and the characteristics of the surrounding skin a purse string closure was deemed most appropriate.  Undermining was performed circumfirentially around the surgical defect.  A purse string suture was then placed and tightened. Purse String (Intermediate) Text: Given the location of the defect and the characteristics of the surrounding skin a purse string intermediate closure was deemed most appropriate.  Undermining was performed circumfirentially around the surgical defect.  A purse string suture was then placed and tightened. Partial Purse String (Simple) Text: Given the location of the defect and the characteristics of the surrounding skin a simple purse string closure was deemed most appropriate.  Undermining was performed circumfirentially around the surgical defect.  A purse string suture was then placed and tightened. Wound tension only allowed a partial closure of the circular defect. Partial Purse String (Intermediate) Text: Given the location of the defect and the characteristics of the surrounding skin an intermediate purse string closure was deemed most appropriate.  Undermining was performed circumfirentially around the surgical defect.  A purse string suture was then placed and tightened. Wound tension only allowed a partial closure of the circular defect. Tarsorrhaphy Text: A tarsorrhaphy was performed using Frost sutures. Manual Repair Warning Statement: We plan on removing the manually selected variable below in favor of our much easier automatic structured text blocks found in the previous tab. We decided to do this to help make the flow better and give you the full power of structured data. Manual selection is never going to be ideal in our platform and I would encourage you to avoid using manual selection from this point on, especially since I will be sunsetting this feature. It is important that you do one of two things with the customized text below. First, you can save all of the text in a word file so you can have it for future reference. Second, transfer the text to the appropriate area in the Library tab. Lastly, if there is a flap or graft type which we do not have you need to let us know right away so I can add it in before the variable is hidden. No need to panic, we plan to give you roughly 6 months to make the change. Same Histology In Subsequent Stages Text: The pattern and morphology of the tumor is as described in the first stage. No Residual Tumor Seen Histology Text: There were no malignant cells seen in the sections examined. Inflammation Suggestive Of Cancer Camouflage Histology Text: There was a dense lymphocytic infiltrate which prevented adequate histologic evaluation of adjacent structures. Bcc Histology Text: There were numerous aggregates of basaloid cells. Bcc Infiltrative Histology Text: There were numerous aggregates of basaloid cells demonstrating an infiltrative pattern. Mart-1 - Positive Histology Text: MART-1 staining demonstrates areas of higher density and clustering of melanocytes with Pagetoid spread upwards within the epidermis. The surgical margins are positive for tumor cells. Mart-1 - Negative Histology Text: MART-1 staining demonstrates a normal density and pattern of melanocytes along the dermal-epidermal junction. The surgical margins are negative for tumor cells. Information: Selecting Yes will display possible errors in your note based on the variables you have selected. This validation is only offered as a suggestion for you. PLEASE NOTE THAT THE VALIDATION TEXT WILL BE REMOVED WHEN YOU FINALIZE YOUR NOTE. IF YOU WANT TO FAX A PRELIMINARY NOTE YOU WILL NEED TO TOGGLE THIS TO 'NO' IF YOU DO NOT WANT IT IN YOUR FAXED NOTE. Bill 59 Modifier?: No - Continue to Bill 79 Modifier

## 2024-12-16 NOTE — ED PROVIDER NOTE - PHYSICAL EXAMINATION
Yoly Hagan DO (PGY2)   Physical Exam:    Gen: NAD, AOx3, non-toxic appearing  Lung: CTAB, no respiratory distress, no wheezes/rhonchi/rales B/L  CV: RRR, no murmurs, rubs or gallops  Abd: soft, NT, ND, no guarding, no rigidity, no rebound tenderness  Neuro: CN2-12 grossly intact, A&Ox4, MS +5/5 in UE and LE BL, finger to nose smooth and rapid, gross sensation intact in UE and LE BL, gait smooth and coordinated, negative rhomberg, negative pronator drift

## 2024-12-16 NOTE — CONSULT NOTE ADULT - SUBJECTIVE AND OBJECTIVE BOX
HPI:    60 y/o F with a history of hemorrhagic CVA, L parietal lobe intra-parenchymal cerebral hemorrhage (2021), seizure d/o, and recently diagnosed with breast cancer who presented for dizziness and right-sided paresthesias. She describes these as symptoms she feels prior to a seizure starting. Initially imaging here without acute CVA, MRI brain showed mild leptomeningeal enhancement involving the cranial nerves which is nonspecific. Oncology consulted due to concern for metastatic disease in setting of new malignancy diagnosis.     The patient had a screening mammogram 3/2024 which was normal. She felt a breast lump and had a repeat mammogram, with ultrasound, on 11/29/24. This revealed a new 4cm spiculated mass in the upper outer quadrant of the right breast, a a stable 9mm circumscribed mass in the left upper outer quadrant of the left breast. An abnormal appearing right axillary 2.4cm lymph node was noted as well. She underwent a bright breast biopsy on 12/6/24 which revealed ER/DC+ HER2- invasive ductal carcinoma in multiple cores as well as the lymph node. I went over these results with the patient today. She has not yet established care with oncology or breast surgery, but had been referred.     Allergies  Motrin (Unknown)  Intolerances    MEDICATIONS  (STANDING):  levETIRAcetam 750 milliGRAM(s) Oral with breakfast  levETIRAcetam 1000 milliGRAM(s) Oral at bedtime    MEDICATIONS  (PRN):  acetaminophen     Tablet .. 975 milliGRAM(s) Oral every 6 hours PRN Temp greater or equal to 38C (100.4F), Mild Pain (1 - 3), Moderate Pain (4 - 6)      PAST MEDICAL & SURGICAL HISTORY:  CVA (cerebrovascular accident)  No significant past surgical history    FAMILY HISTORY:  FH: hypertension (Father)    SOCIAL HISTORY: No EtOH, no tobacco    REVIEW OF SYSTEMS:  CONSTITUTIONAL: No weakness, fevers or chills  EYES/ENT: No visual changes;  No vertigo or throat pain   NECK: No pain or stiffness  RESPIRATORY: No cough, wheezing, hemoptysis; No shortness of breath  CARDIOVASCULAR: No chest pain or palpitations  GASTROINTESTINAL: No abdominal or epigastric pain. No nausea, vomiting, or hematemesis; No diarrhea or constipation. No melena or hematochezia.  GENITOURINARY: No dysuria, frequency or hematuria  NEUROLOGICAL: No numbness or weakness  SKIN: No itching, burning, rashes, or lesions   All other review of systems is negative unless indicated above.    Height (cm): 154.9 (12-16 @ 01:31)  Weight (kg): 112 (12-16 @ 02:05)  BMI (kg/m2): 46.7 (12-16 @ 02:05)  BSA (m2): 2.07 (12-16 @ 02:05)    T(F): 98.2 (12-16-24 @ 14:30), Max: 98.3 (12-16-24 @ 01:31)  HR: 65 (12-16-24 @ 14:30)  BP: 124/82 (12-16-24 @ 14:30)  RR: 16 (12-16-24 @ 14:30)  SpO2: 100% (12-16-24 @ 14:30)  Wt(kg): --    GENERAL: NAD, well-developed, obese   HEAD:  Atraumatic, Normocephalic  EYES: EOMI, PERRLA, conjunctiva and sclera clear  NECK: Supple, No JVD  CHEST/LUNG: Clear to auscultation bilaterally; No wheeze  HEART: Regular rate and rhythm; No murmurs, rubs, or gallops  ABDOMEN: Soft, Nontender, Nondistended; Bowel sounds present  EXTREMITIES:  2+ Peripheral Pulses  SKIN: No rashes or lesions                          12.0   6.47  )-----------( 232      ( 16 Dec 2024 01:50 )             41.0       12-16    137  |  106  |  12  ----------------------------<  112[H]  5.0   |  21[L]  |  0.79    Ca    8.8      16 Dec 2024 01:50    TPro  7.6  /  Alb  3.6  /  TBili  <0.2  /  DBili  x   /  AST  36[H]  /  ALT  21  /  AlkPhos  94  12-16

## 2024-12-16 NOTE — ED ADULT NURSE REASSESSMENT NOTE - NSFALLRISKINTERV_ED_ALL_ED

## 2024-12-16 NOTE — CONSULT NOTE ADULT - SUBJECTIVE AND OBJECTIVE BOX
**STROKE CODE CONSULT NOTE**    Last known well time/Time of onset of symptoms: 2200 12/16/2024    HPI:    PAST MEDICAL & SURGICAL HISTORY:  CVA (cerebrovascular accident)      No significant past surgical history          FAMILY HISTORY:  FH: hypertension (Father)        SOCIAL HISTORY:  Smoking Cessation: Discussed    ROS:  Constitutional: No fever, +weight loss (wanted to lose weight)  Eyes: No eye pain  ENMT:  No difficulty hearing  Cardiovascular: No chest pain  Gastrointestinal: No abdominal painzia.  Neurological: As per HPI    MEDICATIONS  (STANDING):  sodium chloride 0.9%. 1000 milliLiter(s) (125 mL/Hr) IV Continuous <Continuous>    MEDICATIONS  (PRN):      Allergies    Motrin (Unknown)    Intolerances        Vital Signs Last 24 Hrs  T(C): 36.8 (16 Dec 2024 01:31), Max: 36.8 (16 Dec 2024 01:31)  T(F): 98.3 (16 Dec 2024 01:31), Max: 98.3 (16 Dec 2024 01:31)  HR: 66 (16 Dec 2024 01:31) (66 - 66)  BP: 137/74 (16 Dec 2024 01:31) (137/74 - 137/74)  BP(mean): --  RR: 16 (16 Dec 2024 01:31) (16 - 16)  SpO2: 95% (16 Dec 2024 01:31) (95% - 95%)    Parameters below as of 16 Dec 2024 01:31  Patient On (Oxygen Delivery Method): room air        PHYSICAL EXAM:  Constitutional: WDWN; NAD  Cardiovascular: RRR, no appreciable murmurs; no carotid bruits  Neurologic:  Mental status: Awake, alert and oriented x3.  Recent and remote memory intact.  Naming, repetition and comprehension intact.  Attention/concentration intact.  No dysarthria, no aphasia.  Fund of knowledge appropriate.    Cranial nerves: Fundoscopic exam demonstrated no abnormalities, pupils equally round and reactive to light, visual fields full, no nystagmus, extraocular muscles intact, V1 through V3 intact bilaterally and symmetric, face symmetric, hearing intact to finger rub, palate elevation symmetric, tongue was midline, sternocleidomastoid/shoulder shrug strength bilaterally 5/5.    Motor:  Normal bulk and tone, strength 5/5 in bilateral upper and lower extremities.   strength 5/5.  Rapid alternating movements intact and symmetric.   Sensation: Intact to light touch, proprioception, and pinprick.  No neglect.   Coordination: No dysmetria on finger-to-nose and heel-to-shin.  No clumsiness.  Reflexes: 2+ in upper and lower extremities, downgoing toes bilaterally  Gait: Wide based     NIHSS: 0  mRS: 1      Fingerstick Blood Glucose: CAPILLARY BLOOD GLUCOSE  115 (16 Dec 2024 02:02)      POCT Blood Glucose.: 115 mg/dL (16 Dec 2024 01:39)       LABS:                        12.0   6.47  )-----------( 232      ( 16 Dec 2024 01:50 )             41.0                     RADIOLOGY & ADDITIONAL STUDIES:    IV-tenecteplase(Y/N):                                   Bolus time:  Reason IV-tenecteplase not given:   **STROKE CODE CONSULT NOTE**    Last known well time/Time of onset of symptoms: 2200 12/15/2024    HPI: 59F with new diagnosis of metastatic R breast cancer presented to the ED with transient right sided weakness and numbness. LKN 2200     56 y.o. AAF with no significant PMH (although does not follow with doctors) who presents with dizziness, weakness, and disorientation x 2 days. She is also experiencing a HA that began 4 days ago (10/10) and persists while she is in the ED, although with lower severity. She also endorses some weakness on the left side of her body. She denies any numbness, paresthesias, dysphagia, or N/V. No family history of malignancy or CVA. Given persistent symptoms, patient decided to present to the ED for evaluation. Patient continues to endorse dizziness.     In ED, code stroke was called. CT head demonstrates intraparenchymal hemorrhage.  (13 Oct 2021 18:32)      Patient is a 56 year old R-handed female with no known past medical history presents to [facility] ED as code stroke for disorientation x 3 days. LKW on Monday morning (10/11). Patient reports she feels non-specific confusion & disorientation. Also endorses a progressive onset HA that reached a severity of 10/10 that morning, then progressively improved over the last three days, described as "blinding." Denies current HA. Also admits to persistent dizziness at the onset of symptoms that made it difficult to walk, worse with ambulation and eye movements. Admits to weakness on the left side of her body, when asked. Denies changes in vision, blurry vision, double vision, N/V, CP, SOB, palpitations, numbness/tingling, difficulty with speech, difficulty with swallowing. Denies use of tobacco, alcohol, or illicit drugs. Patient does not regularly follow with PCP. Patient does not regularly take any medications.         PAST MEDICAL & SURGICAL HISTORY:  CVA (cerebrovascular accident)      No significant past surgical history          FAMILY HISTORY:  FH: hypertension (Father)        SOCIAL HISTORY:  Smoking Cessation: Discussed    ROS:  Constitutional: No fever, +weight loss (wanted to lose weight)  Eyes: No eye pain  ENMT:  No difficulty hearing  Cardiovascular: No chest pain  Gastrointestinal: No abdominal painzia.  Neurological: As per HPI    MEDICATIONS  (STANDING):  sodium chloride 0.9%. 1000 milliLiter(s) (125 mL/Hr) IV Continuous <Continuous>    MEDICATIONS  (PRN):      Allergies    Motrin (Unknown)    Intolerances        Vital Signs Last 24 Hrs  T(C): 36.8 (16 Dec 2024 01:31), Max: 36.8 (16 Dec 2024 01:31)  T(F): 98.3 (16 Dec 2024 01:31), Max: 98.3 (16 Dec 2024 01:31)  HR: 66 (16 Dec 2024 01:31) (66 - 66)  BP: 137/74 (16 Dec 2024 01:31) (137/74 - 137/74)  BP(mean): --  RR: 16 (16 Dec 2024 01:31) (16 - 16)  SpO2: 95% (16 Dec 2024 01:31) (95% - 95%)    Parameters below as of 16 Dec 2024 01:31  Patient On (Oxygen Delivery Method): room air        PHYSICAL EXAM:  Constitutional: WDWN; NAD  Cardiovascular: RRR, no appreciable murmurs; no carotid bruits  Neurologic:  Mental status: Awake, alert and oriented x3.  Recent and remote memory intact.  Naming, repetition and comprehension intact.  Attention/concentration intact.  No dysarthria, no aphasia.  Fund of knowledge appropriate.    Cranial nerves: Fundoscopic exam demonstrated no abnormalities, pupils equally round and reactive to light, visual fields full, no nystagmus, extraocular muscles intact, V1 through V3 intact bilaterally and symmetric, face symmetric, hearing intact to finger rub, palate elevation symmetric, tongue was midline, sternocleidomastoid/shoulder shrug strength bilaterally 5/5.    Motor:  Normal bulk and tone, strength 5/5 in bilateral upper and lower extremities.   strength 5/5.  Rapid alternating movements intact and symmetric.   Sensation: Intact to light touch, proprioception, and pinprick.  No neglect.   Coordination: No dysmetria on finger-to-nose and heel-to-shin.  No clumsiness.  Reflexes: 2+ in upper and lower extremities, downgoing toes bilaterally  Gait: Wide based     NIHSS: 0  mRS: 1      Fingerstick Blood Glucose: CAPILLARY BLOOD GLUCOSE  115 (16 Dec 2024 02:02)      POCT Blood Glucose.: 115 mg/dL (16 Dec 2024 01:39)       LABS:                        12.0   6.47  )-----------( 232      ( 16 Dec 2024 01:50 )             41.0                     RADIOLOGY & ADDITIONAL STUDIES:  CT Brain Stroke Protocol (12.16.24 @ 02:08)   No acute intracranial hemorrhage, mass effect, or midline shift. If   clinicalsymptoms persist or worsen, more sensitive evaluation with brain   MRI may be obtained, if no contraindications exist.    MR Head No Cont (08.02.23 @ 09:55)  MRI BRAIN: No acute intracranial hemorrhage or evidence of acute ischemia.    MRA NECK AND HEAD: No large vessel occlusion or major stenosis.    CT Brain Stroke Protocol (10.13.21 @ 13:57)   CT brain:  There is an intraparenchymal hemorrhage in the left parietal lobule with perihemorrhagic edema as described. There is no extension of the hemorrhage into the ventricles, midline shift or herniation.  There is no acute lobar infarction.  CTA brain: There is no large vessel occlusion or aneurysm involving major proximal intracranial arteries. There is no dominant arteriovenous malformation.  CTA NECK: There is no stenosis involving major neck arteries.  CT venogram: There is no high-grade stenosis or occlusion within the dural venous sinuses.      IV-tenecteplase(Y/N):            N                       Bolus time:  Reason IV-tenecteplase not given: Previous hemorrhage    **STROKE CODE CONSULT NOTE**    Last known well time/Time of onset of symptoms: 2200 12/15/2024    HPI: 59 R-H F with L brain hemorrhage (2021 - at the time presented with HA and R sided weakness) c/b seizures (on keppra 750/1000, last seizure 07/2023), HTN, new diagnosis of metastatic R breast cancer presented to the ED with transient right sided weakness and numbness. LKN 2200 12/15, patient was sitting on the chair. Says that she needed to use her L hand to bring her right hand up because the latter was weak. Symptoms lasted a little less than one hour before improving back to baseline. Family decided to bring patient to hospital. CODE STROKE called 0146. NIHSS 0. ABCD 4 mRS 1 (patient has a cane at home but does not use it). CT head demonstrated no acute hemorrhage nor mass effect. CT angio demonstrated no LVO. Patient was just recently diagnosed with breast cancer after finding a lump on her breast and confirmed by imaging. During CT scan, patient had an episode of vomiting 2/2 nausea. Not candidate for tenecteplase because of h/o hemorrhage and low NIHSS. Not candidate for thrombectomy because no LVO.     Of note patient was seen in 10/2021 as a code stroke for disorientation x 3 days. Reported non-specific confusion & disorientation and HA that reached 10/10 severity that progressively was improving. Also admitting to persistent dizziness that made it difficult to walk. Given imaging with L sided hemorrhage. Impression was given HA, transient dizziness w/ RLE monoparesis likely 2/2 L parietal,  lobar, intraparenchymal hemorrhage with an unknown mechanism, consider underlying lesion vs cerebral amyloid angiopathy. Has been following up outpatient with Dr. Alex Gan.         PAST MEDICAL & SURGICAL HISTORY:  CVA (cerebrovascular accident)      No significant past surgical history          FAMILY HISTORY:  FH: hypertension (Father)        SOCIAL HISTORY:  Smoking Cessation: Discussed    ROS:  Constitutional: No fever, +weight loss (wanted to lose weight)  Eyes: No eye pain  ENMT:  No difficulty hearing  Cardiovascular: No chest pain  Gastrointestinal: No abdominal painzia.  Neurological: As per HPI    MEDICATIONS  (STANDING):  sodium chloride 0.9%. 1000 milliLiter(s) (125 mL/Hr) IV Continuous <Continuous>    MEDICATIONS  (PRN):      Allergies    Motrin (Unknown)    Intolerances        Vital Signs Last 24 Hrs  T(C): 36.8 (16 Dec 2024 01:31), Max: 36.8 (16 Dec 2024 01:31)  T(F): 98.3 (16 Dec 2024 01:31), Max: 98.3 (16 Dec 2024 01:31)  HR: 66 (16 Dec 2024 01:31) (66 - 66)  BP: 137/74 (16 Dec 2024 01:31) (137/74 - 137/74)  BP(mean): --  RR: 16 (16 Dec 2024 01:31) (16 - 16)  SpO2: 95% (16 Dec 2024 01:31) (95% - 95%)    Parameters below as of 16 Dec 2024 01:31  Patient On (Oxygen Delivery Method): room air        PHYSICAL EXAM:  Constitutional: WDWN; NAD  Cardiovascular: RRR, no appreciable murmurs; no carotid bruits  Neurologic:  Mental status: Awake, alert and oriented x3.  Recent and remote memory intact.  Naming, repetition and comprehension intact.  Attention/concentration intact.  No dysarthria, no aphasia.  Fund of knowledge appropriate.    Cranial nerves: Fundoscopic exam demonstrated no abnormalities, pupils equally round and reactive to light, visual fields full, no nystagmus, extraocular muscles intact, V1 through V3 intact bilaterally and symmetric, face symmetric, hearing intact to finger rub, palate elevation symmetric, tongue was midline, sternocleidomastoid/shoulder shrug strength bilaterally 5/5.    Motor:  Normal bulk and tone, strength 5/5 in bilateral upper and lower extremities.   strength 5/5.  Rapid alternating movements intact and symmetric.   Sensation: Intact to light touch, proprioception, and pinprick.  No neglect.   Coordination: No dysmetria on finger-to-nose and heel-to-shin.  No clumsiness.  Reflexes: 2+ in upper and lower extremities, downgoing toes bilaterally  Gait: Wide based     NIHSS: 0  mRS: 1      Fingerstick Blood Glucose: CAPILLARY BLOOD GLUCOSE  115 (16 Dec 2024 02:02)      POCT Blood Glucose.: 115 mg/dL (16 Dec 2024 01:39)       LABS:                        12.0   6.47  )-----------( 232      ( 16 Dec 2024 01:50 )             41.0                     RADIOLOGY & ADDITIONAL STUDIES:  CT Brain Stroke Protocol (12.16.24 @ 02:08)   No acute intracranial hemorrhage, mass effect, or midline shift. If   clinicalsymptoms persist or worsen, more sensitive evaluation with brain   MRI may be obtained, if no contraindications exist.    MR Head No Cont (08.02.23 @ 09:55)  MRI BRAIN: No acute intracranial hemorrhage or evidence of acute ischemia.    MRA NECK AND HEAD: No large vessel occlusion or major stenosis.    CT Brain Stroke Protocol (10.13.21 @ 13:57)   CT brain:  There is an intraparenchymal hemorrhage in the left parietal lobule with perihemorrhagic edema as described. There is no extension of the hemorrhage into the ventricles, midline shift or herniation.  There is no acute lobar infarction.  CTA brain: There is no large vessel occlusion or aneurysm involving major proximal intracranial arteries. There is no dominant arteriovenous malformation.  CTA NECK: There is no stenosis involving major neck arteries.  CT venogram: There is no high-grade stenosis or occlusion within the dural venous sinuses.      IV-tenecteplase(Y/N):            N                       Bolus time:  Reason IV-tenecteplase not given: Previous hemorrhage    **STROKE CODE CONSULT NOTE**    Last known well time/Time of onset of symptoms: 2200 12/15/2024    HPI: 59 R-H F with L brain hemorrhage (2021 - at the time presented with HA and R sided weakness) c/b seizures (on keppra 750/1000, last seizure 07/2023), HTN, new diagnosis of metastatic R breast cancer presented to the ED with transient right sided weakness and numbness. LKN 2200 12/15, patient was sitting on the chair. Says that she needed to use her L hand to bring her right hand up because the latter was weak. Symptoms lasted a little less than one hour before improving back to baseline. Family decided to bring patient to hospital. CODE STROKE called 0146. NIHSS 0. ABCD 4 mRS 1 (patient has a cane at home but does not use it). CT head demonstrated no acute hemorrhage nor mass effect. CT angio demonstrated no LVO. Patient was just recently diagnosed with breast cancer after finding a lump on her breast and confirmed by imaging. During CT scan, patient had an episode of vomiting 2/2 nausea. Not candidate for tenecteplase because of h/o hemorrhage and low NIHSS. Not candidate for thrombectomy because no LVO.     Of note patient was seen in 10/2021 as a code stroke for disorientation x 3 days. Reported non-specific confusion & disorientation and HA that reached 10/10 severity that progressively was improving. Also admitting to persistent dizziness that made it difficult to walk. Given imaging with L sided hemorrhage. Impression was given HA, transient dizziness w/ RLE monoparesis likely 2/2 L parietal,  lobar, intraparenchymal hemorrhage with an unknown mechanism, consider underlying lesion vs cerebral amyloid angiopathy. Has been following up outpatient with Dr. Alex Gan.         PAST MEDICAL & SURGICAL HISTORY:  CVA (cerebrovascular accident)      No significant past surgical history          FAMILY HISTORY:  FH: hypertension (Father)        SOCIAL HISTORY:  Smoking Cessation: Discussed    ROS:  Constitutional: No fever, +weight loss (wanted to lose weight)  Eyes: No eye pain  ENMT:  No difficulty hearing  Cardiovascular: No chest pain  Gastrointestinal: No abdominal painzia.  Neurological: As per HPI    MEDICATIONS  (STANDING):  sodium chloride 0.9%. 1000 milliLiter(s) (125 mL/Hr) IV Continuous <Continuous>    MEDICATIONS  (PRN):      Allergies    Motrin (Unknown)    Intolerances        Vital Signs Last 24 Hrs  T(C): 36.8 (16 Dec 2024 01:31), Max: 36.8 (16 Dec 2024 01:31)  T(F): 98.3 (16 Dec 2024 01:31), Max: 98.3 (16 Dec 2024 01:31)  HR: 66 (16 Dec 2024 01:31) (66 - 66)  BP: 137/74 (16 Dec 2024 01:31) (137/74 - 137/74)  BP(mean): --  RR: 16 (16 Dec 2024 01:31) (16 - 16)  SpO2: 95% (16 Dec 2024 01:31) (95% - 95%)    Parameters below as of 16 Dec 2024 01:31  Patient On (Oxygen Delivery Method): room air        PHYSICAL EXAM:  Constitutional: WDWN; NAD  Cardiovascular: RRR, no appreciable murmurs; no carotid bruits  Neurologic:  Mental status: Awake, alert and oriented x3.  Recent and remote memory intact.  Naming, repetition and comprehension intact. No dysarthria, no aphasia.  Fund of knowledge appropriate.    Cranial nerves: Pupils equally round and reactive to light, visual fields full, no nystagmus, extraocular muscles intact, V1 through V3 intact bilaterally and symmetric, face symmetric, hearing intact to finger rub, palate elevation symmetric, tongue was midline, shoulder shrug strength bilaterally 5/5.    Motor:  Normal bulk and tone, strength 5/5 in bilateral upper and lower extremities.   strength 5/5.  Rapid alternating movements intact and symmetric.   Sensation: Intact to light touch.  No neglect.   Coordination: No dysmetria on finger-to-nose and heel-to-shin.  No clumsiness.  Reflexes: 2+ in upper and lower extremities, downgoing toes bilaterally  Gait: Wide based but steady, with normal arm swing, unable to perform heel, toe, or tandem walking due to hesitancy     NIHSS: 0  mRS: 1      Fingerstick Blood Glucose: CAPILLARY BLOOD GLUCOSE  115 (16 Dec 2024 02:02)      POCT Blood Glucose.: 115 mg/dL (16 Dec 2024 01:39)       LABS:                        12.0   6.47  )-----------( 232      ( 16 Dec 2024 01:50 )             41.0                     RADIOLOGY & ADDITIONAL STUDIES:  CT Brain Stroke Protocol (12.16.24 @ 02:08)   No acute intracranial hemorrhage, mass effect, or midline shift. If   clinicalsymptoms persist or worsen, more sensitive evaluation with brain   MRI may be obtained, if no contraindications exist.    MR Head No Cont (08.02.23 @ 09:55)  MRI BRAIN: No acute intracranial hemorrhage or evidence of acute ischemia.    MRA NECK AND HEAD: No large vessel occlusion or major stenosis.    CT Brain Stroke Protocol (10.13.21 @ 13:57)   CT brain:  There is an intraparenchymal hemorrhage in the left parietal lobule with perihemorrhagic edema as described. There is no extension of the hemorrhage into the ventricles, midline shift or herniation.  There is no acute lobar infarction.  CTA brain: There is no large vessel occlusion or aneurysm involving major proximal intracranial arteries. There is no dominant arteriovenous malformation.  CTA NECK: There is no stenosis involving major neck arteries.  CT venogram: There is no high-grade stenosis or occlusion within the dural venous sinuses.      IV-tenecteplase(Y/N):            N                       Bolus time:  Reason IV-tenecteplase not given: Previous hemorrhage and low NIHSS (resolving symptoms)   **STROKE CODE CONSULT NOTE**    Last known well time/Time of onset of symptoms: 2200 12/15/2024    HPI: 59 R-H F with L brain hemorrhage (2021 - at the time presented with HA and R sided weakness) c/b seizures (on keppra 750/1000, last seizure 07/2023), HTN, new diagnosis of metastatic R breast cancer presented to the ED with transient right sided weakness and numbness. LKN 2200 12/15, patient was sitting on the chair. Says that she needed to use her L hand to bring her right hand up because the latter was weak. Symptoms lasted a little less than one hour before improving back to baseline. Family decided to bring patient to hospital. CODE STROKE called 0146. NIHSS 0. ABCD 4 mRS 1 (patient has a cane at home but does not use it). CT head demonstrated no acute hemorrhage nor mass effect. CT angio demonstrated no LVO. Patient was just recently diagnosed with breast cancer after finding a lump on her breast and confirmed by imaging. During CT scan, patient had an episode of vomiting 2/2 nausea. Not candidate for tenecteplase because of h/o hemorrhage and low NIHSS. Not candidate for thrombectomy because no LVO.     Of note patient was seen in 10/2021 as a code stroke for disorientation x 3 days. Reported non-specific confusion & disorientation and HA that reached 10/10 severity that progressively was improving. Also admitting to persistent dizziness that made it difficult to walk. Given imaging with L sided hemorrhage. Impression was given HA, transient dizziness w/ RLE monoparesis likely 2/2 L parietal,  lobar, intraparenchymal hemorrhage with an unknown mechanism, consider underlying lesion vs cerebral amyloid angiopathy. Has been following up outpatient with Dr. Alex Gan.         PAST MEDICAL & SURGICAL HISTORY:  CVA (cerebrovascular accident)      No significant past surgical history          FAMILY HISTORY:  FH: hypertension (Father)        SOCIAL HISTORY:  Smoking Cessation: Discussed    ROS:  Constitutional: No fever, +weight loss (wanted to lose weight)  Eyes: No eye pain  ENMT:  No difficulty hearing  Cardiovascular: No chest pain  Gastrointestinal: No abdominal painzia.  Neurological: As per HPI    MEDICATIONS  (STANDING):  sodium chloride 0.9%. 1000 milliLiter(s) (125 mL/Hr) IV Continuous <Continuous>    MEDICATIONS  (PRN):      Allergies    Motrin (Unknown)    Intolerances        Vital Signs Last 24 Hrs  T(C): 36.8 (16 Dec 2024 01:31), Max: 36.8 (16 Dec 2024 01:31)  T(F): 98.3 (16 Dec 2024 01:31), Max: 98.3 (16 Dec 2024 01:31)  HR: 66 (16 Dec 2024 01:31) (66 - 66)  BP: 137/74 (16 Dec 2024 01:31) (137/74 - 137/74)  BP(mean): --  RR: 16 (16 Dec 2024 01:31) (16 - 16)  SpO2: 95% (16 Dec 2024 01:31) (95% - 95%)    Parameters below as of 16 Dec 2024 01:31  Patient On (Oxygen Delivery Method): room air        PHYSICAL EXAM:  Constitutional: WDWN; NAD  Cardiovascular: RRR, no appreciable murmurs; no carotid bruits  Neurologic:  Mental status: Awake, alert and oriented x3.  Recent and remote memory intact.  Naming, repetition and comprehension intact. No dysarthria, no aphasia.  Fund of knowledge appropriate.    Cranial nerves: Pupils equally round and reactive to light, visual fields full, no nystagmus, extraocular muscles intact, V1 through V3 intact bilaterally and symmetric, face symmetric, hearing intact to finger rub, palate elevation symmetric, tongue was midline, shoulder shrug strength bilaterally 5/5.    Motor:  Normal bulk and tone, strength 5/5 in bilateral upper and lower extremities.   strength 5/5.  Rapid alternating movements intact and symmetric.   Sensation: Intact to light touch.  No neglect.   Coordination: No dysmetria on finger-to-nose and heel-to-shin.  No clumsiness.  Reflexes: 2+ in upper and lower extremities, downgoing toes bilaterally  Gait: Wide based but steady, with normal arm swing, unable to perform heel, toe, or tandem walking due to hesitancy     NIHSS: 0  mRS: 1      Fingerstick Blood Glucose: CAPILLARY BLOOD GLUCOSE  115 (16 Dec 2024 02:02)      POCT Blood Glucose.: 115 mg/dL (16 Dec 2024 01:39)       LABS:                        12.0   6.47  )-----------( 232      ( 16 Dec 2024 01:50 )             41.0                     RADIOLOGY & ADDITIONAL STUDIES:  CT Brain Stroke Protocol (12.16.24 @ 02:08)   No acute intracranial hemorrhage, mass effect, or midline shift. If   clinicalsymptoms persist or worsen, more sensitive evaluation with brain   MRI may be obtained, if no contraindications exist.    CT PERFUSION:  Technical limitations: None.    Core infarction: 0 ml  Penumbra / tissue at risk for active ischemia: 0 ml    CTA NECK:  No evidence of significant stenosis or occlusion.    Mildly beaded appearance to the bilateral internal carotid arteries,   which may be seen in setting of fibromuscular dysplasia.    CTA HEAD:  No large vessel occlusion, significant stenosis or vascular abnormality   identified.      MR Head No Cont (08.02.23 @ 09:55)  MRI BRAIN: No acute intracranial hemorrhage or evidence of acute ischemia.    MRA NECK AND HEAD: No large vessel occlusion or major stenosis.    CT Brain Stroke Protocol (10.13.21 @ 13:57)   CT brain:  There is an intraparenchymal hemorrhage in the left parietal lobule with perihemorrhagic edema as described. There is no extension of the hemorrhage into the ventricles, midline shift or herniation.  There is no acute lobar infarction.  CTA brain: There is no large vessel occlusion or aneurysm involving major proximal intracranial arteries. There is no dominant arteriovenous malformation.  CTA NECK: There is no stenosis involving major neck arteries.  CT venogram: There is no high-grade stenosis or occlusion within the dural venous sinuses.      IV-tenecteplase(Y/N):            N                       Bolus time:  Reason IV-tenecteplase not given: Previous hemorrhage and low NIHSS (resolving symptoms)

## 2024-12-16 NOTE — ED ADULT TRIAGE NOTE - CODE STROKE DETAILS
Dr. CHAVA Arriola evaluated pt in triage.  Code stroke called.  Pt not currently symptomatic but earlier had dizziness and right sided body weakness.

## 2024-12-16 NOTE — ED PROVIDER NOTE - CLINICAL SUMMARY MEDICAL DECISION MAKING FREE TEXT BOX
58 yo F pmhc of DM, HTN, HLD, hemorrhagic CVA, and recently diagnosed breast cancer presents for R sided numbness, weakness, and room spinning sensation. Patient was called as a code stroke. Will obtain images to eval for CVA vs ICH vs new mass. Likely admit for MRI. Yoly Hagan DO (PGY-2)

## 2024-12-16 NOTE — H&P ADULT - NSHPPHYSICALEXAM_GEN_ALL_CORE
Vital Signs Last 24 Hrs  T(C): 36.7 (16 Dec 2024 10:30), Max: 36.8 (16 Dec 2024 01:31)  T(F): 98.1 (16 Dec 2024 10:30), Max: 98.3 (16 Dec 2024 01:31)  HR: 62 (16 Dec 2024 10:30) (55 - 93)  BP: 149/75 (16 Dec 2024 10:30) (131/84 - 149/75)  BP(mean): 100 (16 Dec 2024 06:30) (100 - 102)  RR: 16 (16 Dec 2024 10:30) (16 - 18)  SpO2: 98% (16 Dec 2024 10:30) (95% - 100%)    Appearance: Normal	  HEENT:   Normal oral mucosa, PERRL, EOMI	  Lymphatic: No lymphadenopathy , no edema  Cardiovascular: Normal S1 S2, No JVD, No murmurs , Peripheral pulses palpable 2+ bilaterally  Respiratory: Lungs clear to auscultation, normal effort 	  Gastrointestinal:  Soft, Non-tender, + BS	  Skin: No rashes, No ecchymoses, No cyanosis, warm to touch  Musculoskeletal: Normal range of motion, normal strength  Psychiatry:  Mood & affect appropriate  Ext: No edema

## 2024-12-16 NOTE — ED CDU PROVIDER INITIAL DAY NOTE - CLINICAL SUMMARY MEDICAL DECISION MAKING FREE TEXT BOX
Include Z78.9 (Other Specified Conditions Influencing Health Status) As An Associated Diagnosis?: No 60 yo F pmhc of hemorrhagic CVA, seizures, and recently diagnosed breast cancer presents for R sided numbness, weakness, and room spinning sensation.  pm, symptom onset 10 pm with symptom abatement at approx 1 am. In the ED, patient was a code stroke and neuro came to assess the patient immediately. Lab results wnl. CTs were unremarkable for acute findings. Neuro assessed the patient and recommended transfer to CDU for continued monitoring, MRI, and reassessment in the am.

## 2024-12-16 NOTE — ED ADULT NURSE NOTE - OBJECTIVE STATEMENT
pt received to CT scanner  , a&ox4 , ambulatory , phx of  metastatic breast Ca  week ago, seizures, HTN, cva's with no residual , p/w sudden onset of numbness to the R arm  lasting approx 1 hour . Pt breathing even and unlabored on room air. Denies fever, chills, cough, SOB, chest pain, palpitations, dizziness, nausea, vomiting, diarrhea, constipation, numbness, tingling. IV placed. Labs collected and sent. pending CT   . pt educated on fall precautions and confirms understanding via teach back method. Stretcher locked in lowest position with siderails up x2. Call bell and personal items within reach.

## 2024-12-16 NOTE — CONSULT NOTE ADULT - ASSESSMENT
ASSESSMENT       IMPRESSION   Overall stable neurologically. Intermittent dizziness and right sided weakness and numbness. Given previous similar symptoms with and found to have 2021 L sided hemorrhage. In the setting of new metastatic breast cancer diagnosis, considering recrudescence     RECOMMENDATION         Patient to be seen by team and attending. Note finalized upon attending attestation.  ASSESSMENT   59 R-H F with L brain hemorrhage (2021 - at the time presented with HA and R sided weakness) c/b seizures (on keppra 750/1000, last seizure 07/2023), HTN, new diagnosis of metastatic R breast cancer presented to the ED with transient right sided weakness and numbness. LKN 2200 12/15, patient was sitting on the chair. Says that she needed to use her L hand to bring her right hand up because the latter was weak. Symptoms lasted a little less than one hour before improving back to baseline. Family decided to bring patient to hospital. CODE STROKE called 0146. NIHSS 0. ABCD 4 mRS 1 (patient has a cane at home but does not use it). CT head demonstrated no acute hemorrhage nor mass effect. CT angio demonstrated no LVO. Patient was just recently diagnosed with breast cancer after finding a lump on her breast and confirmed by imaging. During CT scan, patient had an episode of vomiting 2/2 nausea. Not candidate for tenecteplase because of h/o hemorrhage and low NIHSS. Not candidate for thrombectomy because no LVO.     IMPRESSION   Overall stable neurologically. Intermittent dizziness and right sided weakness and numbness. Given previous similar symptoms with and found to have 2021 L sided hemorrhage. In the setting of new metastatic breast cancer diagnosis, considering recrudescence     RECOMMENDATION   [] MRI head w/wo contrast to r/o mets   [] Continue home keppra 750mg Qd and 1g Qhs   [] Continue home nifedipine   [] Given likelihood of recrudescence and h/o hemorrhage, does not require AC/AP at this time   [] HgbA1C, fasting lipid panel, CBC, CMP  [] telemetry to check for arrhythmia, EKG    - Tight glucose control (long-term goal HgbA1c < 6%)  - Stroke education and counseling  - Neuro-checks and VS q4h  - -140   - Dysphagia screen. If fails, speech/swallow eval  - aspiration, fall precautions  - STAT CT head non-contrast for change in neuro exam.   - PT/ OT / DVT ppx per primary team     Discussed with telestroke Dr. Misty Tellez regarding decision against candidacy for tenecteplase/ thrombectomy. Will be formally staffed on morning rounds with attending. Recommendations will be complete once signed by attending.

## 2024-12-16 NOTE — ED CDU PROVIDER INITIAL DAY NOTE - OBJECTIVE STATEMENT
60 yo F pmhc of hemorrhagic CVA, seizures, and recently diagnosed breast cancer presents for R sided numbness, weakness, and room spinning sensation.  pm, symptom onset 10 pm with symptom abatement at approx 1 am. Denies fever, chills, chest pain, shortness of breath, abd pain, nausea, vomiting, hematuria, dysuria, or any other symptoms at this time.    CDU ZARA Jiménez: Agree with the above. In the ED, patient was a code stroke and neuro came to assess the patient immediately. Lab results wnl. CTs were unremarkable for acute findings. Neuro assessed the patient and recommended transfer to CDU for continued monitoring, MRI, and reassessment in the am.

## 2024-12-16 NOTE — ED CDU PROVIDER INITIAL DAY NOTE - PHYSICAL EXAMINATION
CONSTITUTIONAL: Comfortable; in no acute distress. Non-toxic appearing.   NEURO: Alert & oriented. Sensory and motor functions are grossly intact.  PSYCH: Mood appropriate. Thought processes intact.   HEAD: NCAT  MUSCULOSKELETAL/EXTREMITIES: FROM in all four extremities; no extremity edema.  SKIN: Warm; dry; no apparent lesions or exudate

## 2024-12-17 LAB
A1C WITH ESTIMATED AVERAGE GLUCOSE RESULT: 5.6 % — SIGNIFICANT CHANGE UP (ref 4–5.6)
ALBUMIN SERPL ELPH-MCNC: 4 G/DL — SIGNIFICANT CHANGE UP (ref 3.3–5)
ALP SERPL-CCNC: 91 U/L — SIGNIFICANT CHANGE UP (ref 40–120)
ALT FLD-CCNC: 20 U/L — SIGNIFICANT CHANGE UP (ref 4–33)
ANION GAP SERPL CALC-SCNC: 12 MMOL/L — SIGNIFICANT CHANGE UP (ref 7–14)
APPEARANCE CSF: CLEAR — SIGNIFICANT CHANGE UP
APPEARANCE SPUN FLD: COLORLESS — SIGNIFICANT CHANGE UP
AST SERPL-CCNC: 20 U/L — SIGNIFICANT CHANGE UP (ref 4–32)
BASOPHILS # BLD AUTO: 0 K/UL — SIGNIFICANT CHANGE UP (ref 0–0.2)
BASOPHILS NFR BLD AUTO: 0 % — SIGNIFICANT CHANGE UP (ref 0–2)
BILIRUB SERPL-MCNC: 0.2 MG/DL — SIGNIFICANT CHANGE UP (ref 0.2–1.2)
BUN SERPL-MCNC: 15 MG/DL — SIGNIFICANT CHANGE UP (ref 7–23)
CALCIUM SERPL-MCNC: 9.4 MG/DL — SIGNIFICANT CHANGE UP (ref 8.4–10.5)
CHLORIDE SERPL-SCNC: 106 MMOL/L — SIGNIFICANT CHANGE UP (ref 98–107)
CHOLEST SERPL-MCNC: 184 MG/DL — SIGNIFICANT CHANGE UP
CO2 SERPL-SCNC: 21 MMOL/L — LOW (ref 22–31)
COLOR CSF: COLORLESS — SIGNIFICANT CHANGE UP
CREAT SERPL-MCNC: 0.85 MG/DL — SIGNIFICANT CHANGE UP (ref 0.5–1.3)
CSF PCR RESULT: SIGNIFICANT CHANGE UP
EGFR: 80 ML/MIN/1.73M2 — SIGNIFICANT CHANGE UP
EOSINOPHIL # BLD AUTO: 0 K/UL — SIGNIFICANT CHANGE UP (ref 0–0.5)
EOSINOPHIL NFR BLD AUTO: 0 % — SIGNIFICANT CHANGE UP (ref 0–6)
ESTIMATED AVERAGE GLUCOSE: 114 — SIGNIFICANT CHANGE UP
GLUCOSE CSF-MCNC: 78 MG/DL — HIGH (ref 40–70)
GLUCOSE SERPL-MCNC: 123 MG/DL — HIGH (ref 70–99)
GRAM STN FLD: SIGNIFICANT CHANGE UP
HCT VFR BLD CALC: 39.9 % — SIGNIFICANT CHANGE UP (ref 34.5–45)
HDLC SERPL-MCNC: 69 MG/DL — SIGNIFICANT CHANGE UP
HGB BLD-MCNC: 12.5 G/DL — SIGNIFICANT CHANGE UP (ref 11.5–15.5)
IANC: 4.66 K/UL — SIGNIFICANT CHANGE UP (ref 1.8–7.4)
IMM GRANULOCYTES NFR BLD AUTO: 1.1 % — HIGH (ref 0–0.9)
LDH CSF L TO P-CCNC: 26 U/L — SIGNIFICANT CHANGE UP
LDH FLD-CCNC: 26 U/L — SIGNIFICANT CHANGE UP
LIPID PNL WITH DIRECT LDL SERPL: 105 MG/DL — HIGH
LYMPHOCYTES # BLD AUTO: 0.81 K/UL — LOW (ref 1–3.3)
LYMPHOCYTES # BLD AUTO: 14.5 % — SIGNIFICANT CHANGE UP (ref 13–44)
LYMPHOCYTES # CSF: 60 % — SIGNIFICANT CHANGE UP
MCHC RBC-ENTMCNC: 25.2 PG — LOW (ref 27–34)
MCHC RBC-ENTMCNC: 31.3 G/DL — LOW (ref 32–36)
MCV RBC AUTO: 80.4 FL — SIGNIFICANT CHANGE UP (ref 80–100)
MONOCYTES # BLD AUTO: 0.05 K/UL — SIGNIFICANT CHANGE UP (ref 0–0.9)
MONOCYTES NFR BLD AUTO: 0.9 % — LOW (ref 2–14)
MONOS+MACROS NFR CSF: 20 % — SIGNIFICANT CHANGE UP
NEUTROPHILS # BLD AUTO: 4.66 K/UL — SIGNIFICANT CHANGE UP (ref 1.8–7.4)
NEUTROPHILS # CSF: 20 % — SIGNIFICANT CHANGE UP
NEUTROPHILS NFR BLD AUTO: 83.5 % — HIGH (ref 43–77)
NIGHT BLUE STAIN TISS: SIGNIFICANT CHANGE UP
NON HDL CHOLESTEROL: 115 MG/DL — SIGNIFICANT CHANGE UP
NRBC # BLD: 0 /100 WBCS — SIGNIFICANT CHANGE UP (ref 0–0)
NRBC # FLD: 0 K/UL — SIGNIFICANT CHANGE UP (ref 0–0)
NRBC NFR CSF: 1 CELLS/UL — SIGNIFICANT CHANGE UP (ref 0–5)
PLATELET # BLD AUTO: 260 K/UL — SIGNIFICANT CHANGE UP (ref 150–400)
POTASSIUM SERPL-MCNC: 4.2 MMOL/L — SIGNIFICANT CHANGE UP (ref 3.5–5.3)
POTASSIUM SERPL-SCNC: 4.2 MMOL/L — SIGNIFICANT CHANGE UP (ref 3.5–5.3)
PROT CSF-MCNC: 39 MG/DL — SIGNIFICANT CHANGE UP (ref 15–45)
PROT SERPL-MCNC: 8.4 G/DL — HIGH (ref 6–8.3)
RBC # BLD: 4.96 M/UL — SIGNIFICANT CHANGE UP (ref 3.8–5.2)
RBC # CSF: 1 CELLS/UL — HIGH (ref 0–0)
RBC # FLD: 15.4 % — HIGH (ref 10.3–14.5)
SODIUM SERPL-SCNC: 139 MMOL/L — SIGNIFICANT CHANGE UP (ref 135–145)
SPECIMEN SOURCE: SIGNIFICANT CHANGE UP
SPECIMEN SOURCE: SIGNIFICANT CHANGE UP
TOTAL CELLS COUNTED, SPINAL FLUID: 5 CELLS — SIGNIFICANT CHANGE UP
TRIGL SERPL-MCNC: 51 MG/DL — SIGNIFICANT CHANGE UP
TSH SERPL-MCNC: 0.93 UIU/ML — SIGNIFICANT CHANGE UP (ref 0.27–4.2)
TUBE TYPE: SIGNIFICANT CHANGE UP
WBC # BLD: 5.58 K/UL — SIGNIFICANT CHANGE UP (ref 3.8–10.5)
WBC # FLD AUTO: 5.58 K/UL — SIGNIFICANT CHANGE UP (ref 3.8–10.5)

## 2024-12-17 PROCEDURE — 62270 DX LMBR SPI PNXR: CPT | Mod: GC

## 2024-12-17 PROCEDURE — 74177 CT ABD & PELVIS W/CONTRAST: CPT | Mod: 26

## 2024-12-17 PROCEDURE — 71260 CT THORAX DX C+: CPT | Mod: 26

## 2024-12-17 RX ORDER — LORAZEPAM 2 MG/1
0.5 TABLET ORAL ONCE
Refills: 0 | Status: DISCONTINUED | OUTPATIENT
Start: 2024-12-17 | End: 2024-12-17

## 2024-12-17 RX ADMIN — ACETAMINOPHEN 500MG 975 MILLIGRAM(S): 500 TABLET, COATED ORAL at 07:00

## 2024-12-17 RX ADMIN — LORAZEPAM 0.5 MILLIGRAM(S): 2 TABLET ORAL at 09:25

## 2024-12-17 RX ADMIN — LEVETIRACETAM 750 MILLIGRAM(S): 1000 TABLET ORAL at 08:54

## 2024-12-17 RX ADMIN — LEVETIRACETAM 1000 MILLIGRAM(S): 1000 TABLET ORAL at 21:44

## 2024-12-17 NOTE — PROGRESS NOTE ADULT - ASSESSMENT
60 y/o F with a history of hemorrhagic CVA, L parietal lobe intra-parenchymal cerebral hemorrhage (2021), seizure d/o, and recently diagnosed with breast cancer who presented for dizziness and right-sided paresthesias. MRI brain showed mild leptomeningeal enhancement involving the cranial nerves which is nonspecific. Oncology consulted due to concern for metastatic disease in setting of new malignancy diagnosis.     - Recent mammo 3/2024 normal, repeat mammo/US 11/2024 revealed a new 4cm mass in the right breast and suspicious axillary LN  - Recent biopsy 12/6/24 reviewed and explained at length to the patient today - shows ER/TX+ HER2- invasive ductal carcinoma, involving multiple cores and a lymph node  - Please obtain a CT chest/abd/pelvis for staging  - Planned for LP in light of nonspecific leptomeningeal enhancement; no solitary brain lesions noted on MRI  - Can check baseline tumor markers including CEA, CA 15-3, CA 27-29  - Explained that if no distant disease is found then she will need to establish care with breast surgery upon discharge (she had been referred to a surgeon in [place] but she lives in [facility], wishes to see one of our surgeons that is closer)  - CT c/a/p - no distant metastasis noted, few pulmonary nodules noted <6 mm likely inflammatory.   - can monitor pulmonary nodules outpatient, every 6m-1y with imaging.   - LP negative.   - unclear etiology of prior brain MRI with mild leptomeningeal enhancement.   - appreciate neuro recs for MRI brain.  - can proceed with surgical intervention outpatient for localized breast cancer.     Will follow    Alma Schulz PA-C  Hematology/Oncology  [facility]  [phone] (office)

## 2024-12-17 NOTE — PROGRESS NOTE ADULT - ASSESSMENT
Patient is a 58 Y/O Female pmhx of hemorrhagic CVA, seizures, and recently diagnosed breast cancer presents for R sided numbness, weakness, and room spinning sensation.  pm, symptom onset 10 pm with symptom abatement at approx 1 am. Denies fever, chills, chest pain, shortness of breath, abd pain, nausea, vomiting, hematuria, dysuria, or any other symptoms at this time.    # R/O CVA   hemiparesis with dizziness, improved and resolved  CVA ruled out  CT head noted   MRI noted  R/O leptomeningeal disease   Neuro eval appreciated   plan for LP , done, follow up labs  fall precautions   LE numbness and weakness B/.L   check spine MRI with and without contrast     # Breast CA   new diagnosis   R/O mets  Pan CT , neagtive for mets   needs premedications  as per protocol   Oncology eval called , appreciated recs     # Hx of Seziures   Neuro follow up   MRI noted   kepra home dose     DVT and GI PPX    discussed in detail with patient regarding plan of care, all questions were answered       Patient seen and examined by me. patient care and plan discussed and reviewed with PA. Plan as outlined above edited by me to reflect our discussion. Advanced care planning/advanced directives discussed with patient/family. DNR status including forceful chest compressions to attempt to restart the heart, ventilator support/artificial breathing, electric shock, artificial nutrition, health care proxy, Molst form all discussed with pt. Fifty seven minutes of total time dedicated to this patient visit today including preparing to see the patient (eg. review of tests), obtaining and/or reviewing separately obtained history, obtaining/reviewing vitals, performing a medically appropriate examination and/or evaluation, counseling and educating the patient/family/caregiver, reviewing previous notes and test results, and procedures, communicating with other health professionals (when not separately reported), and documenting clinical information in the electronic health record.

## 2024-12-17 NOTE — CONSULT NOTE ADULT - ASSESSMENT
EKG NSR unchanged    TTE 8/2023   1. Normal mitral valve. Minimal mitral regurgitation.  2. Normal left ventricular internal dimensions and wall thicknesses.  3. Endocardium not well visualized; grossly normal left ventricular systolic function.  4. The right ventricle is not well visualized; grossly normal right ventricular systolic function.  5. A bubble study was performed with the intravenous injection of agitated saline.  Following contrast injection, no obvious bubbles were seen in the left heart.      A/P    1. R/O CVA   - hemiparesis with dizziness, improved and resolved  - CTA head/neck: No large vessel occlusion, significant stenosis or vascular abnormality identified.  - Head MR L Mild leptomeningeal enhancement involving cranial nerves in the posterior fossa is nonspecific. Consider CSF analysis  -s/p LP 12/17  -repeat echo, SB on tele c/w tele to r/o occult afib    2. Breast CA   - Oncology eval , f/u recs    3. [facility]   [facility]

## 2024-12-17 NOTE — PROGRESS NOTE ADULT - NS ATTEND AMEND GEN_ALL_CORE FT
Patient seen and examined with the PA during rounds  I agree with her assessment and plan    Imaging and LP with no concern for metastatic disease  appreciate neurology input on the cause of her CNS symptoms and considering the brain MRI findings  If low concern for leptomeningeal disease, can proceed with surgical intervention for her breast ca as outpatient

## 2024-12-17 NOTE — CONSULT NOTE ADULT - SUBJECTIVE AND OBJECTIVE BOX
Sathish Long MD  Interventional Cardiology / Endovascular Specialist  [place] Office : [phone] [place] [address] [place] [address]  Tel:   [place] Office : [address] [facility] [place] [address]  Tel: [phone]  Cell : [phone]    HISTORY OF PRESENTING ILLNESS:  HPI:    Patient is a 58 Y/O Female pmhx of hemorrhagic CVA, seizures, and recently diagnosed breast cancer presents for R sided numbness, weakness, and room spinning sensation.  pm, symptom onset 10 pm with symptom abatement at approx 1 am. Denies fever, chills, chest pain, shortness of breath, abd pain, nausea, vomiting, hematuria, dysuria, or any other symptoms at this time.      	  MEDICATIONS:        acetaminophen     Tablet .. 975 milliGRAM(s) Oral every 6 hours PRN  levETIRAcetam 750 milliGRAM(s) Oral with breakfast  levETIRAcetam 1000 milliGRAM(s) Oral at bedtime            PAST MEDICAL/SURGICAL HISTORY  PAST MEDICAL & SURGICAL HISTORY:  CVA (cerebrovascular accident)      No significant past surgical history          SOCIAL HISTORY: Substance Use (street drugs): ( x ) never used  (  ) other:    FAMILY HISTORY:  FH: hypertension (Father)            PHYSICAL EXAM:  T(C): 36.8 (12-17-24 @ 12:00), Max: 36.9 (12-16-24 @ 18:50)  HR: 77 (12-17-24 @ 12:00) (60 - 80)  BP: 141/64 (12-17-24 @ 12:00) (125/80 - 143/64)  RR: 18 (12-17-24 @ 12:00) (17 - 18)  SpO2: 99% (12-17-24 @ 12:00) (97% - 99%)  Wt(kg): --  I&O's Summary        GENERAL: NAD  EYES: EOMI, PERRLA, conjunctiva and sclera clear  ENMT: No tonsillar erythema, exudates, or enlargement; Moist mucous membranes, Good dentition, No lesions  Cardiovascular: Normal S1 S2, No JVD, No murmurs, No edema  Respiratory: Lungs clear to auscultation	  Gastrointestinal:  Soft, Non-tender, + BS	  Extremities:No edema                                    12.5   5.58  )-----------( 260      ( 17 Dec 2024 07:00 )             39.9     12-17    139  |  106  |  15  ----------------------------<  123[H]  4.2   |  21[L]  |  0.85    Ca    9.4      17 Dec 2024 07:00    TPro  8.4[H]  /  Alb  4.0  /  TBili  0.2  /  DBili  x   /  AST  20  /  ALT  20  /  AlkPhos  91  12-17    proBNP:   Lipid Profile:   HgA1c:   TSH: Thyroid Stimulating Hormone, Serum: 0.93 uIU/mL (12-17 @ 07:00)      Consultant(s) Notes Reviewed:  [x ] YES  [ ] NO    Care Discussed with Consultants/Other Providers [ x] YES  [ ] NO    Imaging Personally Reviewed independently:  [x] YES  [ ] NO    All labs, radiologic studies, vitals, orders and medications list reviewed. Patient is seen and examined at bedside. Case discussed with medical team.

## 2024-12-18 ENCOUNTER — RESULT REVIEW (OUTPATIENT)
Age: 59
End: 2024-12-18

## 2024-12-18 LAB
ALBUMIN CSF-MCNC: 34.1 MG/DL — HIGH (ref 14–25)
ALBUMIN SERPL ELPH-MCNC: 3.6 G/DL — SIGNIFICANT CHANGE UP (ref 3.3–5)
ALBUMIN SERPL ELPH-MCNC: 3273 MG/DL — LOW (ref 3500–5200)
ALP SERPL-CCNC: 78 U/L — SIGNIFICANT CHANGE UP (ref 40–120)
ALT FLD-CCNC: 15 U/L — SIGNIFICANT CHANGE UP (ref 4–33)
ANION GAP SERPL CALC-SCNC: 12 MMOL/L — SIGNIFICANT CHANGE UP (ref 7–14)
APTT BLD: 31.9 SEC — SIGNIFICANT CHANGE UP (ref 24.5–35.6)
AST SERPL-CCNC: 15 U/L — SIGNIFICANT CHANGE UP (ref 4–32)
BASOPHILS # BLD AUTO: 0.01 K/UL — SIGNIFICANT CHANGE UP (ref 0–0.2)
BASOPHILS NFR BLD AUTO: 0.1 % — SIGNIFICANT CHANGE UP (ref 0–2)
BILIRUB SERPL-MCNC: <0.2 MG/DL — SIGNIFICANT CHANGE UP (ref 0.2–1.2)
BUN SERPL-MCNC: 20 MG/DL — SIGNIFICANT CHANGE UP (ref 7–23)
CALCIUM SERPL-MCNC: 8.9 MG/DL — SIGNIFICANT CHANGE UP (ref 8.4–10.5)
CHLORIDE SERPL-SCNC: 106 MMOL/L — SIGNIFICANT CHANGE UP (ref 98–107)
CO2 SERPL-SCNC: 23 MMOL/L — SIGNIFICANT CHANGE UP (ref 22–31)
CREAT SERPL-MCNC: 0.98 MG/DL — SIGNIFICANT CHANGE UP (ref 0.5–1.3)
EGFR: 67 ML/MIN/1.73M2 — SIGNIFICANT CHANGE UP
EOSINOPHIL # BLD AUTO: 0.01 K/UL — SIGNIFICANT CHANGE UP (ref 0–0.5)
EOSINOPHIL NFR BLD AUTO: 0.1 % — SIGNIFICANT CHANGE UP (ref 0–6)
GLUCOSE SERPL-MCNC: 90 MG/DL — SIGNIFICANT CHANGE UP (ref 70–99)
HCT VFR BLD CALC: 39.1 % — SIGNIFICANT CHANGE UP (ref 34.5–45)
HGB BLD-MCNC: 11.8 G/DL — SIGNIFICANT CHANGE UP (ref 11.5–15.5)
IANC: 4.64 K/UL — SIGNIFICANT CHANGE UP (ref 1.8–7.4)
IGG CSF-MCNC: 7.7 MG/DL — HIGH
IGG FLD-MCNC: 1710 MG/DL — HIGH (ref 610–1660)
IGG SYNTH RATE SER+CSF CALC-MRATE: -7 MG/DAY — SIGNIFICANT CHANGE UP
IGG/ALB CLEAR SER+CSF-RTO: 0.4 — SIGNIFICANT CHANGE UP
IGG/ALB CSF: 0.23 RATIO — SIGNIFICANT CHANGE UP
IGG/ALB SER: 0.52 RATIO — SIGNIFICANT CHANGE UP
IMM GRANULOCYTES NFR BLD AUTO: 0.4 % — SIGNIFICANT CHANGE UP (ref 0–0.9)
INR BLD: 0.94 RATIO — SIGNIFICANT CHANGE UP (ref 0.85–1.16)
LYMPHOCYTES # BLD AUTO: 3.1 K/UL — SIGNIFICANT CHANGE UP (ref 1–3.3)
LYMPHOCYTES # BLD AUTO: 37.8 % — SIGNIFICANT CHANGE UP (ref 13–44)
MAGNESIUM SERPL-MCNC: 2.3 MG/DL — SIGNIFICANT CHANGE UP (ref 1.6–2.6)
MCHC RBC-ENTMCNC: 24.8 PG — LOW (ref 27–34)
MCHC RBC-ENTMCNC: 30.2 G/DL — LOW (ref 32–36)
MCV RBC AUTO: 82.3 FL — SIGNIFICANT CHANGE UP (ref 80–100)
MONOCYTES # BLD AUTO: 0.42 K/UL — SIGNIFICANT CHANGE UP (ref 0–0.9)
MONOCYTES NFR BLD AUTO: 5.1 % — SIGNIFICANT CHANGE UP (ref 2–14)
NEUTROPHILS # BLD AUTO: 4.64 K/UL — SIGNIFICANT CHANGE UP (ref 1.8–7.4)
NEUTROPHILS NFR BLD AUTO: 56.5 % — SIGNIFICANT CHANGE UP (ref 43–77)
NRBC # BLD: 0 /100 WBCS — SIGNIFICANT CHANGE UP (ref 0–0)
NRBC # FLD: 0 K/UL — SIGNIFICANT CHANGE UP (ref 0–0)
PHOSPHATE SERPL-MCNC: 3.5 MG/DL — SIGNIFICANT CHANGE UP (ref 2.5–4.5)
PLATELET # BLD AUTO: 250 K/UL — SIGNIFICANT CHANGE UP (ref 150–400)
POTASSIUM SERPL-MCNC: 3.8 MMOL/L — SIGNIFICANT CHANGE UP (ref 3.5–5.3)
POTASSIUM SERPL-SCNC: 3.8 MMOL/L — SIGNIFICANT CHANGE UP (ref 3.5–5.3)
PROT SERPL-MCNC: 7.3 G/DL — SIGNIFICANT CHANGE UP (ref 6–8.3)
PROTHROM AB SERPL-ACNC: 11.2 SEC — SIGNIFICANT CHANGE UP (ref 9.9–13.4)
RBC # BLD: 4.75 M/UL — SIGNIFICANT CHANGE UP (ref 3.8–5.2)
RBC # FLD: 15.7 % — HIGH (ref 10.3–14.5)
SODIUM SERPL-SCNC: 141 MMOL/L — SIGNIFICANT CHANGE UP (ref 135–145)
WBC # BLD: 8.21 K/UL — SIGNIFICANT CHANGE UP (ref 3.8–10.5)
WBC # FLD AUTO: 8.21 K/UL — SIGNIFICANT CHANGE UP (ref 3.8–10.5)

## 2024-12-18 PROCEDURE — 88108 CYTOPATH CONCENTRATE TECH: CPT | Mod: 26

## 2024-12-18 PROCEDURE — 93356 MYOCRD STRAIN IMG SPCKL TRCK: CPT

## 2024-12-18 PROCEDURE — 93306 TTE W/DOPPLER COMPLETE: CPT | Mod: 26

## 2024-12-18 RX ORDER — LORAZEPAM 2 MG/1
0.5 TABLET ORAL ONCE
Refills: 0 | Status: DISCONTINUED | OUTPATIENT
Start: 2024-12-18 | End: 2024-12-19

## 2024-12-18 RX ADMIN — LEVETIRACETAM 1000 MILLIGRAM(S): 1000 TABLET ORAL at 21:38

## 2024-12-18 RX ADMIN — LEVETIRACETAM 750 MILLIGRAM(S): 1000 TABLET ORAL at 08:35

## 2024-12-18 NOTE — OCCUPATIONAL THERAPY INITIAL EVALUATION ADULT - PERTINENT HX OF CURRENT PROBLEM, REHAB EVAL
Patient is a 60 y/o Female PMH of hemorrhagic CVA, seizures, and recently diagnosed breast cancer presents for R sided numbness, weakness, and room spinning sensation.

## 2024-12-18 NOTE — PHYSICAL THERAPY INITIAL EVALUATION ADULT - LEVEL OF INDEPENDENCE: STAIR NEGOTIATION, REHAB EVAL
Pt instructed on ascending leading with stronger leg and descending leading with weaker leg/contact guard

## 2024-12-18 NOTE — PHYSICAL THERAPY INITIAL EVALUATION ADULT - ADDITIONAL COMMENTS
Pt states she lives in an apartment +elevator and has total of 6 steps to enter. Prior to admission, pt was ambulating independently without any assistive devices. Pt owns a rolling walker. Pt states she lives in an apartment +elevator and has total of 6 steps to enter. Prior to admission, pt was ambulating independently without any assistive devices. Pt owns a rolling walker.  Post PT evaluation, pt left seated at edge of bed, alarm on, call bell and remote within reach, all precautions maintained, NAD. RN aware.

## 2024-12-18 NOTE — OCCUPATIONAL THERAPY INITIAL EVALUATION ADULT - GENERAL OBSERVATIONS, REHAB EVAL
Patient found semi-reclined in bed, NAD, and able to follow directions. Vitals: HR 61 BPM. Patient agreeable to participate in skilled OT evaluation.

## 2024-12-18 NOTE — PROGRESS NOTE ADULT - NS ATTEND AMEND GEN_ALL_CORE FT
LP completed. spoke with primary team to ensure cytology was sent on LP.  follow up with [facility] as oupt

## 2024-12-18 NOTE — PHYSICAL THERAPY INITIAL EVALUATION ADULT - PERTINENT HX OF CURRENT PROBLEM, REHAB EVAL
60 yo F pmhc of hemorrhagic CVA, seizures, and recently diagnosed breast cancer presents for R sided numbness, weakness, and room spinning sensation, admitted for r/o Stroke

## 2024-12-18 NOTE — PROGRESS NOTE ADULT - ASSESSMENT
58 y/o F with a history of hemorrhagic CVA, L parietal lobe intra-parenchymal cerebral hemorrhage (2021), seizure d/o, and recently diagnosed with breast cancer who presented for dizziness and right-sided paresthesias. MRI brain showed mild leptomeningeal enhancement involving the cranial nerves which is nonspecific. Oncology consulted due to concern for metastatic disease in setting of new malignancy diagnosis.     - Recent mammo 3/2024 normal, repeat mammo/US 11/2024 revealed a new 4cm mass in the right breast and suspicious axillary LN  - Recent biopsy 12/6/24 reviewed and explained at length to the patient today - shows ER/MS+ HER2- invasive ductal carcinoma, involving multiple cores and a lymph node  - Please obtain a CT chest/abd/pelvis for staging  - Planned for LP in light of nonspecific leptomeningeal enhancement; no solitary brain lesions noted on MRI  - Can check baseline tumor markers including CEA, CA 15-3, CA 27-29  - Explained that if no distant disease is found then she will need to establish care with breast surgery upon discharge (she had been referred to a surgeon in [place] but she lives in [facility], wishes to see one of our surgeons that is closer)  - CT c/a/p - no distant metastasis noted, few pulmonary nodules noted <6 mm likely inflammatory.   - can monitor pulmonary nodules outpatient, every 6m-1y with imaging.   - LP done in 12/17/24, cytology pending.    - unclear etiology of prior brain MRI with mild leptomeningeal enhancement.   - appreciate neuro recs for MRI brain.  - can proceed with surgical intervention outpatient for localized breast cancer.     Will follow    Sybil Alatorre NP  Hematology/Oncology  [facility]  [phone] (office)    60 y/o F with a history of hemorrhagic CVA, L parietal lobe intra-parenchymal cerebral hemorrhage (2021), seizure d/o, and recently diagnosed with breast cancer who presented for dizziness and right-sided paresthesias. MRI brain showed mild leptomeningeal enhancement involving the cranial nerves which is nonspecific. Oncology consulted due to concern for metastatic disease in setting of new malignancy diagnosis.     - Recent mammo 3/2024 normal, repeat mammo/US 11/2024 revealed a new 4cm mass in the right breast and suspicious axillary LN  - Recent biopsy 12/6/24 reviewed and explained at length to the patient today - shows ER/ME+ HER2- invasive ductal carcinoma, involving multiple cores and a lymph node  - Please obtain a CT chest/abd/pelvis for staging  - Planned for LP in light of nonspecific leptomeningeal enhancement; no solitary brain lesions noted on MRI  - Can check baseline tumor markers including CEA, CA 15-3, CA 27-29  - Explained that if no distant disease is found then she will need to establish care with breast surgery upon discharge (she had been referred to a surgeon in [place] but she lives in [facility], wishes to see one of our surgeons that is closer)  - CT c/a/p - no distant metastasis noted, few pulmonary nodules noted <6 mm likely inflammatory.   - can monitor pulmonary nodules outpatient, every 6m-1y with imaging.   - LP done in 12/17/24, cytology pending.    - unclear etiology of prior brain MRI with mild leptomeningeal enhancement.   - appreciate neuro recs for MRI brain.  - can proceed with surgical intervention outpatient for localized breast cancer.     Will follow    Sybil Alatorre NP  Hematology/Oncology  [facility]  [phone] (office)

## 2024-12-18 NOTE — PROGRESS NOTE ADULT - ASSESSMENT
EKG NSR unchanged    TTE 8/2023   1. Normal mitral valve. Minimal mitral regurgitation.  2. Normal left ventricular internal dimensions and wall thicknesses.  3. Endocardium not well visualized; grossly normal left ventricular systolic function.  4. The right ventricle is not well visualized; grossly normal right ventricular systolic function.  5. A bubble study was performed with the intravenous injection of agitated saline.  Following contrast injection, no obvious bubbles were seen in the left heart.      A/P    1. R/O CVA   - hemiparesis with dizziness, improved and resolved  - CTA head/neck: No large vessel occlusion, significant stenosis or vascular abnormality identified.  - Head MR L Mild leptomeningeal enhancement involving cranial nerves in the posterior fossa is nonspecific. Consider CSF analysis  - s/p LP 12/17  - repeat echo shows normal LV function,negative for intracardiac shunt.   - SB on tele c/w tele to r/o occult afib    2. Breast CA   - Oncology eval , f/u recs    3. Hx of Seziures   -keppra

## 2024-12-18 NOTE — CHART NOTE - NSCHARTNOTEFT_GEN_A_CORE
IPT Consulted for Diagnostic Lumbar Puncture.    Indications for Lumbar Puncture Include:  URGENT  - Concern for CNS Infection  - Suspected SAH  NONURGENT  - Suspect Idiopathic Intracranial Hypertension (IIH)  - Suspect Carcinomatosis Meningitis  - Concern NPH  - Concern for CNS Lymphoma  - Concern for Neurosyphilis  - Concern for Autoimmune Encephalitis  SUPPLEMENTING OTHER DIAGNOSTICS  - Multiple Sclerosis  - GBS  - CNS Vasculitis  - Paraneoplastic Syndromes  - Neurosarcoid    Contraindications to Lumbar Puncture include:  - Unstable Airway or other hemodynamics  - Increased ICP with concern for possible cerebral herniation, obstructive hydrocephalus, cerebral edema, or space-occupying lesion ( must be cleared by Neurosurgery first )  - Intrathecal pump (must be done with fluoro)  - Severe Thrombocytopenia (Platelets < 50k)  - Known Spinal Epidural Abscess, Cellulitis at injection site  - Anticoagulant Therapy:  ----- Anti-Platelets: Prasugrel, Clopidogrel, ticagrelor, etc. to be held 5-7 days prior to procedure  ----- DOACs: Apixaban, Rivaroxaban, Dabigatran to be held 72 hours prior to procedure, resumed 24 hours post procedure  ----- Therapeutic LVX: Held 24 hours prior to procedure, resumed 24 hours post-procedure  ----- Therapeutic Heparin: Held 4 hours prior to procedure, resumed 1 hour post-procedure  ----- Prophylactic LVX: Held 12 hours prior to procedure, resumed 4 hours post-procedure  ----- Prophylactic Heparin: Held 4 hours prior to procedure, resumed 1 hour post-procedure  ----- Warfarin: Held until INR < 1.5      ASSESSMENT/RECOMMENDATIONS    60 Y/O Female pmhx of hemorrhagic CVA, seizures, and recently diagnosed breast cancer who presented with right sided numbness and weakness, underwent stroke workup and was found to have MRI showing possible leptomeningeal disease. Procedure team is consulted for LP for further workup.     - Please obtain CBC/PT/PTT/INR on AM of procedure  - Please hold therapeutic/prophylactic anticoagulation based on recommendations above  - Please place orders for any requested diagnostic studies on day of procedure. These are used to calculate the fluid volume necessary.   - Primary team must physically deliver collected samples to lab. (Cannot be sent through pneumatic tube)  - If Prion disease suspected, samples MUST be collected with IR in OR Suite (Policy #: [id number])  - Pt does NOT need to be NPO  - If pt is NOT fully alert & oriented, please identify pt's surrogate decision maker and notify procedure team.
Patient planned for CT Chest/ Abdomen/ Pelvis in AM. As per patient, no known history to contrast or dye. CT made aware, no need for premedication. Dr. Shane notified.
Spoke to Lab, requisition form filled and submitted for cytology.
attempted to see patient down for testing    CT C/A/P no acute findings  CSF results as of yet wnl, awaiting flow cytology and cytometry    Reviewed MRI with Dr. Raisa Bentley, not convinced leptomeningeal enhancement  GIven clinically benign, benign CSF results so far further /u as an outpt     Can follow up with Neurology, Dr. Gan at [phone]  Can consider repeat MR Brain with fat sat as an outpt to assess if truly leptomeningeal enhancement

## 2024-12-19 ENCOUNTER — TRANSCRIPTION ENCOUNTER (OUTPATIENT)
Age: 59
End: 2024-12-19

## 2024-12-19 VITALS
RESPIRATION RATE: 17 BRPM | TEMPERATURE: 98 F | HEART RATE: 68 BPM | OXYGEN SATURATION: 100 % | DIASTOLIC BLOOD PRESSURE: 83 MMHG | SYSTOLIC BLOOD PRESSURE: 134 MMHG

## 2024-12-19 LAB
ANION GAP SERPL CALC-SCNC: 11 MMOL/L — SIGNIFICANT CHANGE UP (ref 7–14)
BASOPHILS # BLD AUTO: 0.02 K/UL — SIGNIFICANT CHANGE UP (ref 0–0.2)
BASOPHILS NFR BLD AUTO: 0.3 % — SIGNIFICANT CHANGE UP (ref 0–2)
BUN SERPL-MCNC: 17 MG/DL — SIGNIFICANT CHANGE UP (ref 7–23)
CALCIUM SERPL-MCNC: 8.5 MG/DL — SIGNIFICANT CHANGE UP (ref 8.4–10.5)
CHLORIDE SERPL-SCNC: 107 MMOL/L — SIGNIFICANT CHANGE UP (ref 98–107)
CO2 SERPL-SCNC: 23 MMOL/L — SIGNIFICANT CHANGE UP (ref 22–31)
CREAT SERPL-MCNC: 0.93 MG/DL — SIGNIFICANT CHANGE UP (ref 0.5–1.3)
EGFR: 72 ML/MIN/1.73M2 — SIGNIFICANT CHANGE UP
EOSINOPHIL # BLD AUTO: 0.11 K/UL — SIGNIFICANT CHANGE UP (ref 0–0.5)
EOSINOPHIL NFR BLD AUTO: 1.5 % — SIGNIFICANT CHANGE UP (ref 0–6)
GLUCOSE SERPL-MCNC: 85 MG/DL — SIGNIFICANT CHANGE UP (ref 70–99)
HCT VFR BLD CALC: 39.1 % — SIGNIFICANT CHANGE UP (ref 34.5–45)
HGB BLD-MCNC: 11.7 G/DL — SIGNIFICANT CHANGE UP (ref 11.5–15.5)
IANC: 3.91 K/UL — SIGNIFICANT CHANGE UP (ref 1.8–7.4)
IMM GRANULOCYTES NFR BLD AUTO: 0.3 % — SIGNIFICANT CHANGE UP (ref 0–0.9)
LYMPHOCYTES # BLD AUTO: 2.77 K/UL — SIGNIFICANT CHANGE UP (ref 1–3.3)
LYMPHOCYTES # BLD AUTO: 38.4 % — SIGNIFICANT CHANGE UP (ref 13–44)
MAGNESIUM SERPL-MCNC: 2.2 MG/DL — SIGNIFICANT CHANGE UP (ref 1.6–2.6)
MCHC RBC-ENTMCNC: 24.5 PG — LOW (ref 27–34)
MCHC RBC-ENTMCNC: 29.9 G/DL — LOW (ref 32–36)
MCV RBC AUTO: 82 FL — SIGNIFICANT CHANGE UP (ref 80–100)
MONOCYTES # BLD AUTO: 0.38 K/UL — SIGNIFICANT CHANGE UP (ref 0–0.9)
MONOCYTES NFR BLD AUTO: 5.3 % — SIGNIFICANT CHANGE UP (ref 2–14)
NEUTROPHILS # BLD AUTO: 3.91 K/UL — SIGNIFICANT CHANGE UP (ref 1.8–7.4)
NEUTROPHILS NFR BLD AUTO: 54.2 % — SIGNIFICANT CHANGE UP (ref 43–77)
NRBC # BLD: 0 /100 WBCS — SIGNIFICANT CHANGE UP (ref 0–0)
NRBC # FLD: 0 K/UL — SIGNIFICANT CHANGE UP (ref 0–0)
PHOSPHATE SERPL-MCNC: 3.1 MG/DL — SIGNIFICANT CHANGE UP (ref 2.5–4.5)
PLATELET # BLD AUTO: 230 K/UL — SIGNIFICANT CHANGE UP (ref 150–400)
POTASSIUM SERPL-MCNC: 3.7 MMOL/L — SIGNIFICANT CHANGE UP (ref 3.5–5.3)
POTASSIUM SERPL-SCNC: 3.7 MMOL/L — SIGNIFICANT CHANGE UP (ref 3.5–5.3)
RBC # BLD: 4.77 M/UL — SIGNIFICANT CHANGE UP (ref 3.8–5.2)
RBC # FLD: 16 % — HIGH (ref 10.3–14.5)
SODIUM SERPL-SCNC: 141 MMOL/L — SIGNIFICANT CHANGE UP (ref 135–145)
WBC # BLD: 7.21 K/UL — SIGNIFICANT CHANGE UP (ref 3.8–10.5)
WBC # FLD AUTO: 7.21 K/UL — SIGNIFICANT CHANGE UP (ref 3.8–10.5)

## 2024-12-19 PROCEDURE — 72158 MRI LUMBAR SPINE W/O & W/DYE: CPT | Mod: 26

## 2024-12-19 PROCEDURE — 72156 MRI NECK SPINE W/O & W/DYE: CPT | Mod: 26

## 2024-12-19 PROCEDURE — 72157 MRI CHEST SPINE W/O & W/DYE: CPT | Mod: 26

## 2024-12-19 RX ORDER — LEVETIRACETAM 1000 MG/1
1 TABLET ORAL
Qty: 0 | Refills: 0 | DISCHARGE
Start: 2024-12-19

## 2024-12-19 RX ADMIN — LEVETIRACETAM 750 MILLIGRAM(S): 1000 TABLET ORAL at 08:30

## 2024-12-19 RX ADMIN — LORAZEPAM 0.5 MILLIGRAM(S): 2 TABLET ORAL at 09:43

## 2024-12-19 NOTE — DISCHARGE NOTE PROVIDER - HOSPITAL COURSE
Patient is a 60 Y/O Female pmhx of hemorrhagic CVA, seizures, and recently diagnosed breast cancer presents for R sided numbness, weakness, and room spinning sensation.  pm, symptom onset 10 pm with symptom abatement at approx 1 am. Denies fever, chills, chest pain, shortness of breath, abd pain, nausea, vomiting, hematuria, dysuria, or any other symptoms at this time.    Hospital Course  # R/O CVA   Pt is s/p MRI Brain: Mild leptomeningeal enhancement involving cranial nerves in the   posterior fossa is nonspecific. Consider CSF analysis  Left parietal parenchymal hemorrhage and gliosis appears stable from   2023. Small focus of enhancement involving the anterior wall of the right   internal auditory canal may represent developmental variant.  s/p LP: Cytology     check spine MRI with and without contrast : No cervical metastasis.   No abnormal enhancement.   Moderate mid cervical degenerative disc disease includes C4-C5 focal left   central, C5-C6 broad right central and C6-C7 more shallow left central   disc protrusions (disc herniation)  causes ventral cord deformity    2. THORACIC SPINE:   No thoracic metastasis. No abnormal enhancement.   Mild to moderate thoracic degenerative disc disease includes T8-T9 broad   left central disc protrusion (disc herniation) that causes ventral cord   deformity    3. LUMBAR SPINE:   The lumbar metastasis. No abnormal enhancement. Grade   1 anterior listhesis L4 on L5 in conjunction with additional degenerative   features results in moderate degenerative central canal stenosis at L4-L5        # Breast CA   new diagnosis   R/O mets  Pan CT , neagtive for mets   needs premedications  as per protocol   Oncology eval called , appreciated recs     # Hx of Seziures   Neuro follow up   MRI noted   kepra home dose    Patient is a 58 Y/O Female pmhx of hemorrhagic CVA, seizures, and recently diagnosed breast cancer presents for R sided numbness, weakness, and room spinning sensation.  pm, symptom onset 10 pm with symptom abatement at approx 1 am. Denies fever, chills, chest pain, shortness of breath, abd pain, nausea, vomiting, hematuria, dysuria, or any other symptoms at this time.    Hospital Course  Pt Ruled out For CVA    Pt is s/p MRI Brain: Concern for Mild leptomeningeal enhancement involving cranial nerves in the   posterior fossa is nonspecific. Consider CSF analysis  Left parietal parenchymal hemorrhage and gliosis appears stable from   2023. Small focus of enhancement involving the anterior wall of the right   internal auditory canal may represent developmental variant.  s/p LP: Cytology , Oncology to Follow     Spine MRI with and without contrast : No cervical metastasis.   No abnormal enhancement.   Moderate mid cervical degenerative disc disease includes C4-C5 focal left   central, C5-C6 broad right central and C6-C7 more shallow left central   disc protrusions (disc herniation)  causes ventral cord deformity  THORACIC SPINE:   No thoracic metastasis. No abnormal enhancement.   Mild to moderate thoracic degenerative disc disease includes T8-T9 broad   left central disc protrusion (disc herniation) that causes ventral cord   deformity  LUMBAR SPINE:   No lumbar metastasis. No abnormal enhancement. Grade   1 anterior listhesis L4 on L5 in conjunction with additional degenerative   features results in moderate degenerative central canal stenosis at L4-L5    # Breast CA   Pan CT , negative for mets    Pt To Follow up Sushil Gamez (Oncology ) and Dr Haroon Corral ( Breast surgeon)     # Hx of Seziures   Neuro follow up   MRI noted   kepra home dose

## 2024-12-19 NOTE — DISCHARGE NOTE PROVIDER - CARE PROVIDER_API CALL
Alex Gan  Neurology  [address] [facility], [address]  [place] [address]  Phone: [phone]  Fax: [phone]  Follow Up Time:     Alvin De La Cruz  Hematology  [address] [place] [id number]  Phone: [phone]  Fax: [phone]  Follow Up Time:     Haroon Lux (Breast Surgeon)  [address] [place] [id number]  Phone: [phone]  Fax: (   )    -  Follow Up Time: 1 week    Irene Obando  Medical Oncology  [address] [place] [address]  Phone: [phone]  Fax: [phone]  Follow Up Time:

## 2024-12-19 NOTE — DISCHARGE NOTE PROVIDER - PROVIDER TOKENS
PROVIDER:[TOKEN:[[id number]]],PROVIDER:[TOKEN:[[id number]]],FREE:[LAST:[Dr Corral],FIRST:[Haroon (Breast Surgeon)],PHONE:[phone]],FAX:[(   )    -],ADDRESS:[[address] [place] [address]],FOLLOWUP:[1 week]],PROVIDER:[TOKEN:[[id number]]]

## 2024-12-19 NOTE — PROGRESS NOTE ADULT - NSPROGADDITIONALINFOA_GEN_ALL_CORE
D[facility] 07-Aug-2021 23:38

## 2024-12-19 NOTE — PROGRESS NOTE ADULT - SUBJECTIVE AND OBJECTIVE BOX
Name of Patient : HARRIETT SHANNON  MRN: [id number]  Date of visit: 12-19-24      Subjective: Patient seen and examined. No new events except as noted.   doing okay   MRI spine     REVIEW OF SYSTEMS:    CONSTITUTIONAL: No weakness, fevers or chills  EYES/ENT: No visual changes;  No vertigo or throat pain   NECK: No pain or stiffness  RESPIRATORY: No cough, wheezing, hemoptysis; No shortness of breath  CARDIOVASCULAR: No chest pain or palpitations  GASTROINTESTINAL: No abdominal or epigastric pain. No nausea, vomiting, or hematemesis; No diarrhea or constipation. No melena or hematochezia.  GENITOURINARY: No dysuria, frequency or hematuria  NEUROLOGICAL: No numbness or weakness  SKIN: No itching, burning, rashes, or lesions   All other review of systems is negative unless indicated above.    MEDICATIONS:  MEDICATIONS  (STANDING):  levETIRAcetam 750 milliGRAM(s) Oral with breakfast  levETIRAcetam 1000 milliGRAM(s) Oral at bedtime      PHYSICAL EXAM:  T(C): 36.7 (12-19-24 @ 12:00), Max: 36.7 (12-19-24 @ 03:00)  HR: 68 (12-19-24 @ 12:00) (58 - 80)  BP: 134/83 (12-19-24 @ 12:00) (128/65 - 134/83)  RR: 17 (12-19-24 @ 12:00) (17 - 18)  SpO2: 100% (12-19-24 @ 12:00) (98% - 100%)  Wt(kg): --  I&O's Summary        Appearance: Normal	  HEENT:  PERRLA   Lymphatic: No lymphadenopathy   Cardiovascular: Normal S1 S2, no JVD  Respiratory: normal effort , clear  Gastrointestinal:  Soft, Non-tender  Skin: No rashes,  warm to touch  Psychiatry:  Mood & affect appropriate  Musculuskeletal: No edema    recent labs, Imaging and EKGs personally reviewed   CODE status discussed with the patient in detail                          11.7   7.21  )-----------( 230      ( 19 Dec 2024 04:39 )             39.1               12-19    141  |  107  |  17  ----------------------------<  85  3.7   |  23  |  0.93    Ca    8.5      19 Dec 2024 04:39  Phos  3.1     12-19  Mg     2.20     12-19    TPro  7.3  /  Alb  3.6  /  TBili  <0.2  /  DBili  x   /  AST  15  /  ALT  15  /  AlkPhos  78  12-18    PT/INR - ( 18 Dec 2024 05:26 )   PT: 11.2 sec;   INR: 0.94 ratio         PTT - ( 18 Dec 2024 05:26 )  PTT:31.9 sec                   Urinalysis Basic - ( 19 Dec 2024 04:39 )    Color: x / Appearance: x / SG: x / pH: x  Gluc: 85 mg/dL / Ketone: x  / Bili: x / Urobili: x   Blood: x / Protein: x / Nitrite: x   Leuk Esterase: x / RBC: x / WBC x   Sq Epi: x / Non Sq Epi: x / Bacteria: x                    
Name of Patient : HARRIETT SHANNON  MRN: [id number]  Date of visit: 12-18-24      Subjective: Patient seen and examined. No new events except as noted.     REVIEW OF SYSTEMS:    CONSTITUTIONAL: No weakness, fevers or chills  EYES/ENT: No visual changes;  No vertigo or throat pain   NECK: No pain or stiffness  RESPIRATORY: No cough, wheezing, hemoptysis; No shortness of breath  CARDIOVASCULAR: No chest pain or palpitations  GASTROINTESTINAL: No abdominal or epigastric pain. No nausea, vomiting, or hematemesis; No diarrhea or constipation. No melena or hematochezia.  GENITOURINARY: No dysuria, frequency or hematuria  NEUROLOGICAL: No numbness or weakness  SKIN: No itching, burning, rashes, or lesions   All other review of systems is negative unless indicated above.    MEDICATIONS:  MEDICATIONS  (STANDING):  levETIRAcetam 750 milliGRAM(s) Oral with breakfast  levETIRAcetam 1000 milliGRAM(s) Oral at bedtime  LORazepam     Tablet 0.5 milliGRAM(s) Oral once      PHYSICAL EXAM:  T(C): 36.6 (12-18-24 @ 19:00), Max: 37 (12-18-24 @ 00:00)  HR: 87 (12-18-24 @ 19:00) (57 - 87)  BP: 131/82 (12-18-24 @ 19:00) (118/59 - 148/76)  RR: 18 (12-18-24 @ 19:00) (17 - 18)  SpO2: 98% (12-18-24 @ 19:00) (97% - 100%)  Wt(kg): --  I&O's Summary        Appearance: Normal	  HEENT:  PERRLA   Lymphatic: No lymphadenopathy   Cardiovascular: Normal S1 S2, no JVD  Respiratory: normal effort , clear  Gastrointestinal:  Soft, Non-tender  Skin: No rashes,  warm to touch  Psychiatry:  Mood & affect appropriate  Musculuskeletal: No edema    recent labs, Imaging and EKGs personally reviewed   CODE status discussed with the patient in detail                          11.8   8.21  )-----------( 250      ( 18 Dec 2024 05:26 )             39.1               12-18    141  |  106  |  20  ----------------------------<  90  3.8   |  23  |  0.98    Ca    8.9      18 Dec 2024 05:26  Phos  3.5     12-18  Mg     2.30     12-18    TPro  7.3  /  Alb  3.6  /  TBili  <0.2  /  DBili  x   /  AST  15  /  ALT  15  /  AlkPhos  78  12-18    PT/INR - ( 18 Dec 2024 05:26 )   PT: 11.2 sec;   INR: 0.94 ratio         PTT - ( 18 Dec 2024 05:26 )  PTT:31.9 sec                   Urinalysis Basic - ( 18 Dec 2024 05:26 )    Color: x / Appearance: x / SG: x / pH: x  Gluc: 90 mg/dL / Ketone: x  / Bili: x / Urobili: x   Blood: x / Protein: x / Nitrite: x   Leuk Esterase: x / RBC: x / WBC x   Sq Epi: x / Non Sq Epi: x / Bacteria: x                    
Name of Patient : HARRIETT SHANNON  MRN: [id number]  Date of visit: 12-17-24     Subjective: Patient seen and examined. No new events except as noted.   doing okay  has LE weakness and numbness   LP done this AM,      REVIEW OF SYSTEMS:    CONSTITUTIONAL: No weakness, fevers or chills  EYES/ENT: No visual changes;  No vertigo or throat pain   NECK: No pain or stiffness  RESPIRATORY: No cough, wheezing, hemoptysis; No shortness of breath  CARDIOVASCULAR: No chest pain or palpitations  GASTROINTESTINAL: No abdominal or epigastric pain. No nausea, vomiting, or hematemesis; No diarrhea or constipation. No melena or hematochezia.  GENITOURINARY: No dysuria, frequency or hematuria  NEUROLOGICAL: No numbness or weakness  SKIN: No itching, burning, rashes, or lesions   All other review of systems is negative unless indicated above.    MEDICATIONS:  MEDICATIONS  (STANDING):  levETIRAcetam 750 milliGRAM(s) Oral with breakfast  levETIRAcetam 1000 milliGRAM(s) Oral at bedtime      PHYSICAL EXAM:  T(C): 36.6 (12-17-24 @ 16:00), Max: 36.8 (12-17-24 @ 04:00)  HR: 65 (12-17-24 @ 16:00) (60 - 77)  BP: 123/67 (12-17-24 @ 16:00) (123/67 - 143/64)  RR: 18 (12-17-24 @ 16:00) (17 - 18)  SpO2: 100% (12-17-24 @ 16:00) (97% - 100%)  Wt(kg): --  I&O's Summary        Appearance: Normal	  HEENT:  PERRLA   Lymphatic: No lymphadenopathy   Cardiovascular: Normal S1 S2, no JVD  Respiratory: normal effort , clear  Gastrointestinal:  Soft, Non-tender  Skin: No rashes,  warm to touch  Psychiatry:  Mood & affect appropriate  Musculuskeletal: No edema    recent labs, Imaging and EKGs personally reviewed   CODE status discussed with the patient in detail    Culture - CSF with Gram Stain (collected 12-17-24 @ 11:00)  Source: .CSF  Gram Stain (12-17-24 @ 13:57):    No polymorphonuclear leukocytes seen    No organisms seen    by cytocentrifuge    Culture - Acid Fast - CSF (collected 12-17-24 @ 11:00)  Source: .CSF CSF                          12.5   5.58  )-----------( 260      ( 17 Dec 2024 07:00 )             39.9               12-17    139  |  106  |  15  ----------------------------<  123[H]  4.2   |  21[L]  |  0.85    Ca    9.4      17 Dec 2024 07:00    TPro  8.4[H]  /  Alb  4.0  /  TBili  0.2  /  DBili  x   /  AST  20  /  ALT  20  /  AlkPhos  91  12-17    PT/INR - ( 16 Dec 2024 01:50 )   PT: 11.0 sec;   INR: 0.92 ratio         PTT - ( 16 Dec 2024 01:50 )  PTT:42.0 sec                   Urinalysis Basic - ( 17 Dec 2024 07:00 )    Color: x / Appearance: x / SG: x / pH: x  Gluc: 123 mg/dL / Ketone: x  / Bili: x / Urobili: x   Blood: x / Protein: x / Nitrite: x   Leuk Esterase: x / RBC: x / WBC x   Sq Epi: x / Non Sq Epi: x / Bacteria: x                
Patient is a 59y old  Female who presents with a chief complaint of Stroke (16 Dec 2024 16:29)    No acute events overnight.  Patient examined today at bedside.   Completed imaging and lumbar puncture.  She reports worsening leg weakness.     MEDICATIONS  (STANDING):  levETIRAcetam 750 milliGRAM(s) Oral with breakfast  levETIRAcetam 1000 milliGRAM(s) Oral at bedtime    MEDICATIONS  (PRN):  acetaminophen     Tablet .. 975 milliGRAM(s) Oral every 6 hours PRN Temp greater or equal to 38C (100.4F), Mild Pain (1 - 3), Moderate Pain (4 - 6)      ROS  No fever, sweats, chills  No epistaxis, HA, sore throat  No CP, SOB, cough, sputum      Vital Signs Last 24 Hrs  T(C): 36.4 (17 Dec 2024 08:00), Max: 36.9 (16 Dec 2024 18:50)  T(F): 97.6 (17 Dec 2024 08:00), Max: 98.5 (16 Dec 2024 18:50)  HR: 60 (17 Dec 2024 08:00) (60 - 80)  BP: 143/64 (17 Dec 2024 08:00) (124/82 - 143/64)  BP(mean): --  RR: 18 (17 Dec 2024 08:00) (16 - 18)  SpO2: 98% (17 Dec 2024 08:00) (97% - 100%)    Parameters below as of 17 Dec 2024 08:00  Patient On (Oxygen Delivery Method): room air        PE  NAD  Awake, alert                            12.5   5.58  )-----------( 260      ( 17 Dec 2024 07:00 )             39.9       12-17    139  |  106  |  15  ----------------------------<  123[H]  4.2   |  21[L]  |  0.85    Ca    9.4      17 Dec 2024 07:00    TPro  8.4[H]  /  Alb  4.0  /  TBili  0.2  /  DBili  x   /  AST  20  /  ALT  20  /  AlkPhos  91  12-17      
Sathish Long MD   Interventional Cardiology / Endovascular Specialist   [place] Office : [address] [place] [address] [place] [address]  Tel:    [place] Office : [address] [facility] [place] [address]  Tel: [phone]   Cell : [phone]      Pt is lying in bed comfortable not in distress, no chest pains no SOB no palpitations  	  MEDICATIONS:        acetaminophen     Tablet .. 975 milliGRAM(s) Oral every 6 hours PRN  levETIRAcetam 750 milliGRAM(s) Oral with breakfast  levETIRAcetam 1000 milliGRAM(s) Oral at bedtime  LORazepam     Tablet 0.5 milliGRAM(s) Oral once            PAST MEDICAL/SURGICAL HISTORY  PAST MEDICAL & SURGICAL HISTORY:  CVA (cerebrovascular accident)      No significant past surgical history          SOCIAL HISTORY: Substance Use (street drugs): ( x ) never used  (  ) other:    FAMILY HISTORY:  FH: hypertension (Father)          PHYSICAL EXAM:  T(C): 36.8 (12-18-24 @ 12:00), Max: 37 (12-18-24 @ 00:00)  HR: 61 (12-18-24 @ 12:00) (57 - 65)  BP: 127/69 (12-18-24 @ 12:00) (123/67 - 148/76)  RR: 18 (12-18-24 @ 12:00) (17 - 18)  SpO2: 100% (12-18-24 @ 12:00) (97% - 100%)  Wt(kg): --  I&O's Summary        GENERAL: NAD, obese  EYES:   PERRLA   ENMT:   Moist mucous membranes, Good dentition, No lesions  Cardiovascular: Normal S1 S2, No JVD, No murmurs, No edema  Respiratory: Lungs clear to auscultation	  Gastrointestinal:  Soft, Non-tender, + BS	  Extremities: BLE trace pitting edema                                    11.8   8.21  )-----------( 250      ( 18 Dec 2024 05:26 )             39.1     12-18    141  |  106  |  20  ----------------------------<  90  3.8   |  23  |  0.98    Ca    8.9      18 Dec 2024 05:26  Phos  3.5     12-18  Mg     2.30     12-18    TPro  7.3  /  Alb  3.6  /  TBili  <0.2  /  DBili  x   /  AST  15  /  ALT  15  /  AlkPhos  78  12-18    proBNP:   Lipid Profile:   HgA1c:   TSH:     Consultant(s) Notes Reviewed:  [x ] YES  [ ] NO    Care Discussed with Consultants/Other Providers [ x] YES  [ ] NO    Imaging Personally Reviewed independently:  [x] YES  [ ] NO    All labs, radiologic studies, vitals, orders and medications list reviewed. Patient is seen and examined at bedside. Case discussed with medical team.        
Sathish Long MD   Interventional Cardiology / Endovascular Specialist   [place] Office : [address] [place] [address] [place] [address]  Tel:    [place] Office : [address] [facility] [place] [address]  Tel: [phone]   Cell : [phone]      Pt is lying in bed comfortable not in distress, no chest pains no SOB no palpitations  	  MEDICATIONS:        acetaminophen     Tablet .. 975 milliGRAM(s) Oral every 6 hours PRN  levETIRAcetam 750 milliGRAM(s) Oral with breakfast  levETIRAcetam 1000 milliGRAM(s) Oral at bedtime            PAST MEDICAL/SURGICAL HISTORY  PAST MEDICAL & SURGICAL HISTORY:  CVA (cerebrovascular accident)      No significant past surgical history          SOCIAL HISTORY: Substance Use (street drugs): ( x ) never used  (  ) other:    FAMILY HISTORY:  FH: hypertension (Father)          PHYSICAL EXAM:  T(C): 36.7 (12-19-24 @ 12:00), Max: 36.7 (12-19-24 @ 03:00)  HR: 68 (12-19-24 @ 12:00) (58 - 87)  BP: 134/83 (12-19-24 @ 12:00) (118/59 - 134/83)  RR: 17 (12-19-24 @ 12:00) (17 - 18)  SpO2: 100% (12-19-24 @ 12:00) (98% - 100%)  Wt(kg): --  I&O's Summary        GENERAL: NAD, obese  EYES:   PERRLA   ENMT:   Moist mucous membranes, Good dentition, No lesions  Cardiovascular: Normal S1 S2, No JVD, No murmurs, No edema  Respiratory: Lungs clear to auscultation	  Gastrointestinal:  Soft, Non-tender, + BS	  Extremities: BLE trace pitting edema                                      11.7   7.21  )-----------( 230      ( 19 Dec 2024 04:39 )             39.1     12-19    141  |  107  |  17  ----------------------------<  85  3.7   |  23  |  0.93    Ca    8.5      19 Dec 2024 04:39  Phos  3.1     12-19  Mg     2.20     12-19    TPro  7.3  /  Alb  3.6  /  TBili  <0.2  /  DBili  x   /  AST  15  /  ALT  15  /  AlkPhos  78  12-18    proBNP:   Lipid Profile:   HgA1c:   TSH:     Consultant(s) Notes Reviewed:  [x ] YES  [ ] NO    Care Discussed with Consultants/Other Providers [ x] YES  [ ] NO    Imaging Personally Reviewed independently:  [x] YES  [ ] NO    All labs, radiologic studies, vitals, orders and medications list reviewed. Patient is seen and examined at bedside. Case discussed with medical team.        
Patient is a 59y old  Female who presents with a chief complaint of Stroke (17 Dec 2024 14:20)      MEDICATIONS  (STANDING):  levETIRAcetam 750 milliGRAM(s) Oral with breakfast  levETIRAcetam 1000 milliGRAM(s) Oral at bedtime  LORazepam     Tablet 0.5 milliGRAM(s) Oral once    MEDICATIONS  (PRN):  acetaminophen     Tablet .. 975 milliGRAM(s) Oral every 6 hours PRN Temp greater or equal to 38C (100.4F), Mild Pain (1 - 3), Moderate Pain (4 - 6)    Vital Signs Last 24 Hrs  T(C): 36.9 (18 Dec 2024 08:00), Max: 37 (18 Dec 2024 00:00)  T(F): 98.5 (18 Dec 2024 08:00), Max: 98.6 (18 Dec 2024 00:00)  HR: 57 (18 Dec 2024 08:00) (57 - 77)  BP: 146/74 (18 Dec 2024 08:00) (123/67 - 148/76)  BP(mean): --  RR: 18 (18 Dec 2024 08:00) (17 - 18)  SpO2: 100% (18 Dec 2024 08:00) (97% - 100%)    Parameters below as of 18 Dec 2024 08:00  Patient On (Oxygen Delivery Method): room air    PE  NAD  Awake, alert  Anicteric, MMM  RRR  CTAB  Abd soft, NT, ND                          11.8   8.21  )-----------( 250      ( 18 Dec 2024 05:26 )             39.1       12-18    141  |  106  |  20  ----------------------------<  90  3.8   |  23  |  0.98    Ca    8.9      18 Dec 2024 05:26  Phos  3.5     12-18  Mg     2.30     12-18    TPro  7.3  /  Alb  3.6  /  TBili  <0.2  /  DBili  x   /  AST  15  /  ALT  15  /  AlkPhos  78  12-18      
Patient is a 59y old  Female who presents with a chief complaint of Stroke (18 Dec 2024 12:46)      MEDICATIONS  (STANDING):  levETIRAcetam 750 milliGRAM(s) Oral with breakfast  levETIRAcetam 1000 milliGRAM(s) Oral at bedtime    MEDICATIONS  (PRN):  acetaminophen     Tablet .. 975 milliGRAM(s) Oral every 6 hours PRN Temp greater or equal to 38C (100.4F), Mild Pain (1 - 3), Moderate Pain (4 - 6)      Vital Signs Last 24 Hrs  T(C): 36.7 (19 Dec 2024 08:00), Max: 36.7 (19 Dec 2024 03:00)  T(F): 98.1 (19 Dec 2024 08:00), Max: 98.1 (19 Dec 2024 08:00)  HR: 58 (19 Dec 2024 03:00) (58 - 87)  BP: 128/65 (19 Dec 2024 08:00) (118/59 - 133/69)  BP(mean): --  RR: 17 (19 Dec 2024 08:00) (17 - 18)  SpO2: 99% (19 Dec 2024 08:00) (98% - 100%)    Parameters below as of 19 Dec 2024 08:00  Patient On (Oxygen Delivery Method): room air      PE  NAD  Awake, alert  Anicteric, MMM  RRR  CTAB  Abd soft, NT, ND                          11.7   7.21  )-----------( 230      ( 19 Dec 2024 04:39 )             39.1       12-19    141  |  107  |  17  ----------------------------<  85  3.7   |  23  |  0.93    Ca    8.5      19 Dec 2024 04:39  Phos  3.1     12-19  Mg     2.20     12-19    TPro  7.3  /  Alb  3.6  /  TBili  <0.2  /  DBili  x   /  AST  15  /  ALT  15  /  AlkPhos  78  12-18

## 2024-12-19 NOTE — DISCHARGE NOTE PROVIDER - CARE PROVIDERS DIRECT ADDRESSES
,DirectAddress_Unknown,DirectAddress_Unknown,DirectAddress_Unknown,[email or web address]

## 2024-12-19 NOTE — PROGRESS NOTE ADULT - ASSESSMENT
Patient is a 60 Y/O Female pmhx of hemorrhagic CVA, seizures, and recently diagnosed breast cancer presents for R sided numbness, weakness, and room spinning sensation.  pm, symptom onset 10 pm with symptom abatement at approx 1 am. Denies fever, chills, chest pain, shortness of breath, abd pain, nausea, vomiting, hematuria, dysuria, or any other symptoms at this time.    # R/O CVA   hemiparesis with dizziness, improved and resolved  CVA ruled out  CT head noted   MRI noted  R/O leptomeningeal disease   Neuro eval appreciated   plan for LP , done, follow up labs  fall precautions   LE numbness and weakness B/.L   check spine MRI with and without contrast     # Breast CA   new diagnosis   R/O mets  Pan CT , neagtive for mets   needs premedications  as per protocol   Oncology eval called , appreciated recs     # Hx of Seziures   Neuro follow up   MRI noted   kepra home dose     DVT and GI PPX    discussed in detail with patient regarding plan of care, all questions were answered       Patient seen and examined by me. patient care and plan discussed and reviewed with PA. Plan as outlined above edited by me to reflect our discussion. Advanced care planning/advanced directives discussed with patient/family. DNR status including forceful chest compressions to attempt to restart the heart, ventilator support/artificial breathing, electric shock, artificial nutrition, health care proxy, Molst form all discussed with pt. Fifty seven minutes of total time dedicated to this patient visit today including preparing to see the patient (eg. review of tests), obtaining and/or reviewing separately obtained history, obtaining/reviewing vitals, performing a medically appropriate examination and/or evaluation, counseling and educating the patient/family/caregiver, reviewing previous notes and test results, and procedures, communicating with other health professionals (when not separately reported), and documenting clinical information in the electronic health record.

## 2024-12-19 NOTE — DISCHARGE NOTE NURSING/CASE MANAGEMENT/SOCIAL WORK - PATIENT PORTAL LINK FT
You can access the FollowMyHealth Patient Portal offered by [facility] by registering at the following website: [email or web address]. By joining [facility]’s FollowMyHealth portal, you will also be able to view your health information using other applications (apps) compatible with our system.

## 2024-12-19 NOTE — PROGRESS NOTE ADULT - PROVIDER SPECIALTY LIST ADULT
Cardiology
Heme/Onc
Heme/Onc
Internal Medicine
Heme/Onc
Internal Medicine
Internal Medicine
Cardiology

## 2024-12-19 NOTE — DISCHARGE NOTE NURSING/CASE MANAGEMENT/SOCIAL WORK - FINANCIAL ASSISTANCE
[facility] provides services at a reduced cost to those who are determined to be eligible through [facility]’s financial assistance program. Information regarding [facility]’s financial assistance program can be found by going to [email or web address] or by calling [phone].

## 2024-12-19 NOTE — DISCHARGE NOTE PROVIDER - NSDCMRMEDTOKEN_GEN_ALL_CORE_FT
cholecalciferol oral tablet: 1 tab(s) orally once a day 2000 unit(s) orally once a day  Keppra 1000 mg oral tablet: 1 tab(s) orally 2 times a day  NIFEdipine 30 mg oral tablet, extended release: 1 tab(s) orally once a day  Qulipta 60 mg oral tablet: 1 tab(s) orally once a day   levETIRAcetam 1000 mg oral tablet: 1 tab(s) orally once a day (at bedtime)  levETIRAcetam 750 mg oral tablet: 1 tab(s) orally once a day (before a meal)

## 2024-12-19 NOTE — DISCHARGE NOTE PROVIDER - NSDCCPCAREPLAN_GEN_ALL_CORE_FT
PRINCIPAL DISCHARGE DIAGNOSIS  Diagnosis: Paresthesias  Assessment and Plan of Treatment: You ruled out For a stroke  you had an MRI of spine which revealed some disc herniations  Please Follow up with Your Neurologist for Follow up      SECONDARY DISCHARGE DIAGNOSES  Diagnosis: Breast cancer  Assessment and Plan of Treatment: Please Call Dr Gamez ( Oncologist) for follow up  also, Please Call Dr Haroon Corral ( Breast Surgeon) to schedule surgery   you also had a Lumbar Puncture, Your Oncologist will relay the final report to You    Diagnosis: Abnormal MRI  Assessment and Plan of Treatment:

## 2024-12-19 NOTE — PROGRESS NOTE ADULT - NS ATTEND AMEND GEN_ALL_CORE FT
Agree with above assessment and plan.   4cm breast mass with LAD. Neuro work up as above. MRIs unremarkable. CSF pending. Plan to follow up outpatient with breast surgeon and medical oncology.   Sent email to hospital coordination to expedite care. She will plan to see Dr Haroon Corral with surg onc and Dr Irene Obando for medical oncology. Agree with above assessment and plan.   4cm breast mass with LAD. Neuro work up as above. MRI spine unremarkable. MRI brain with questionable leptomeningeal involvement, CSF pending. Plan to follow up outpatient with breast surgeon and medical oncology.   Sent email to hospital coordination to expedite care. She will plan to see Dr Haroon Corral with surg onc and Dr Irene Obando for medical oncology.

## 2024-12-19 NOTE — PROGRESS NOTE ADULT - ASSESSMENT
58 y/o F with a history of hemorrhagic CVA, L parietal lobe intra-parenchymal cerebral hemorrhage (2021), seizure d/o, and recently diagnosed with breast cancer who presented for dizziness and right-sided paresthesias. MRI brain showed mild leptomeningeal enhancement involving the cranial nerves which is nonspecific. Oncology consulted due to concern for metastatic disease in setting of new malignancy diagnosis.     - Recent mammo 3/2024 normal, repeat mammo/US 11/2024 revealed a new 4cm mass in the right breast and suspicious axillary LN  - Recent biopsy 12/6/24 reviewed and explained at length to the patient today - shows ER/AZ+ HER2- invasive ductal carcinoma, involving multiple cores and a lymph node  - Please obtain a CT chest/abd/pelvis for staging  - Performed LP 12/17/24 in light of nonspecific leptomeningeal enhancement, cytology pending; no solitary brain lesions noted on MRI  - Can check baseline tumor markers including CEA, CA 15-3, CA 27-29  - Explained that if no distant disease is found then she will need to establish care with breast surgery upon discharge (she had been referred to a surgeon in [place] but she lives in [facility] [place] [facility], wishes to see one of our surgeons that is closer)  - CT c/a/p - no distant metastasis noted, few pulmonary nodules noted <6 mm likely inflammatory.   - can monitor pulmonary nodules outpatient, every 6m-1y with imaging.   - unclear etiology of prior brain MRI with mild leptomeningeal enhancement.   - Neuro recs for repeat MRI brain as outpt and CSF   - As per hardeep, CSF results as of yet wnl, awaiting flow cytology and cytometry. GIven clinically benign, benign CSF results so far further f/u as an outpt  - MRI spine in progress today 12/19/24.    - can proceed with surgical intervention outpatient for localized breast cancer.     Will follow    Sybil Alatorre NP  Hematology/Oncology  [facility]  [phone] (office)    58 y/o F with a history of hemorrhagic CVA, L parietal lobe intra-parenchymal cerebral hemorrhage (2021), seizure d/o, and recently diagnosed with breast cancer who presented for dizziness and right-sided paresthesias. MRI brain showed mild leptomeningeal enhancement involving the cranial nerves which is nonspecific. Oncology consulted due to concern for metastatic disease in setting of new malignancy diagnosis.     - Recent mammo 3/2024 normal, repeat mammo/US 11/2024 revealed a new 4cm mass in the right breast and suspicious axillary LN  - Recent biopsy 12/6/24 reviewed and explained at length to the patient today - shows ER/CT+ HER2- invasive ductal carcinoma, involving multiple cores and a lymph node  - Please obtain a CT chest/abd/pelvis for staging  - Performed LP 12/17/24 in light of nonspecific leptomeningeal enhancement, cytology pending; no solitary brain lesions noted on MRI  - Can check baseline tumor markers including CEA, CA 15-3, CA 27-29  - Explained that if no distant disease is found then she will need to establish care with breast surgery upon discharge (she had been referred to a surgeon in [place] but [place] lives in [facility], wishes to see one of our surgeons that is closer)  - CT c/a/p - no distant metastasis noted, few pulmonary nodules noted <6 mm likely inflammatory.   - can monitor pulmonary nodules outpatient, every 6m-1y with imaging.   - unclear etiology of prior brain MRI with mild leptomeningeal enhancement.  Neuro recs for repeat MRI brain as outpt and CSF   - GIven clinically benign, benign CSF results so far further f/u as an outpt  - MRI spine in progress today 12/19/24.    - can proceed with surgical intervention outpatient for localized breast cancer.     Will follow    Sybil Alatorre NP  Hematology/Oncology  [facility]  [phone] (office)

## 2024-12-20 LAB — MBP CSF-MCNC: 5.7 NG/ML — HIGH (ref 0–3.7)

## 2024-12-22 LAB
CULTURE RESULTS: SIGNIFICANT CHANGE UP
SPECIMEN SOURCE: SIGNIFICANT CHANGE UP

## 2024-12-23 LAB
ADMARK PHOSPHO-TAU/TOTAL-TA RESULT: SIGNIFICANT CHANGE UP
NON-GYNECOLOGICAL CYTOLOGY STUDY: SIGNIFICANT CHANGE UP

## 2024-12-24 LAB — OLIGOCLONAL BANDS CSF ELPH-IMP: SIGNIFICANT CHANGE UP

## 2025-01-07 LAB
AMPHIPHYSIN AB TITR CSF: NEGATIVE — SIGNIFICANT CHANGE UP
CV2 IGG TITR CSF: NEGATIVE — SIGNIFICANT CHANGE UP
GLIAL NUC TYPE 1 AB TITR CSF: NEGATIVE — SIGNIFICANT CHANGE UP
HU1 AB TITR CSF IF: NEGATIVE — SIGNIFICANT CHANGE UP
HU2 AB TITR CSF IF: NEGATIVE — SIGNIFICANT CHANGE UP
HU3 AB TITR CSF: NEGATIVE — SIGNIFICANT CHANGE UP
IFA NOTES: SIGNIFICANT CHANGE UP
PARANEOPLASTIC INTERPRETATION, CSF: SIGNIFICANT CHANGE UP
PCA-TR AB TITR CSF: NEGATIVE — SIGNIFICANT CHANGE UP
PURKINJE CELL CYTOPLASMIC AB TYPE 2: NEGATIVE — SIGNIFICANT CHANGE UP
PURKINJE CELLS AB TITR CSF IF: NEGATIVE — SIGNIFICANT CHANGE UP